# Patient Record
Sex: FEMALE | Race: WHITE | NOT HISPANIC OR LATINO | Employment: UNEMPLOYED | URBAN - METROPOLITAN AREA
[De-identification: names, ages, dates, MRNs, and addresses within clinical notes are randomized per-mention and may not be internally consistent; named-entity substitution may affect disease eponyms.]

---

## 2017-04-25 ENCOUNTER — HOSPITAL ENCOUNTER (EMERGENCY)
Facility: HOSPITAL | Age: 27
Discharge: HOME | End: 2017-04-25
Attending: EMERGENCY MEDICINE
Payer: MEDICAID

## 2017-04-25 VITALS
TEMPERATURE: 98 F | OXYGEN SATURATION: 98 % | BODY MASS INDEX: 40.48 KG/M2 | HEIGHT: 62 IN | RESPIRATION RATE: 20 BRPM | DIASTOLIC BLOOD PRESSURE: 79 MMHG | HEART RATE: 89 BPM | SYSTOLIC BLOOD PRESSURE: 139 MMHG | WEIGHT: 220 LBS

## 2017-04-25 DIAGNOSIS — J18.9 PNEUMONIA OF RIGHT LOWER LOBE DUE TO INFECTIOUS ORGANISM: Primary | ICD-10-CM

## 2017-04-25 PROCEDURE — 25000242 PHARM REV CODE 250 ALT 637 W/ HCPCS: Performed by: EMERGENCY MEDICINE

## 2017-04-25 PROCEDURE — 94640 AIRWAY INHALATION TREATMENT: CPT

## 2017-04-25 PROCEDURE — 99284 EMERGENCY DEPT VISIT MOD MDM: CPT | Mod: 25

## 2017-04-25 PROCEDURE — 25000003 PHARM REV CODE 250: Performed by: EMERGENCY MEDICINE

## 2017-04-25 RX ORDER — IPRATROPIUM BROMIDE AND ALBUTEROL SULFATE 2.5; .5 MG/3ML; MG/3ML
3 SOLUTION RESPIRATORY (INHALATION)
Status: COMPLETED | OUTPATIENT
Start: 2017-04-25 | End: 2017-04-25

## 2017-04-25 RX ORDER — AZITHROMYCIN 250 MG/1
250 TABLET, FILM COATED ORAL DAILY
Qty: 6 TABLET | Refills: 0 | Status: SHIPPED | OUTPATIENT
Start: 2017-04-25 | End: 2017-05-05

## 2017-04-25 RX ORDER — AZITHROMYCIN 250 MG/1
1000 TABLET, FILM COATED ORAL
Status: COMPLETED | OUTPATIENT
Start: 2017-04-25 | End: 2017-04-25

## 2017-04-25 RX ADMIN — AZITHROMYCIN 1000 MG: 250 TABLET, FILM COATED ORAL at 10:04

## 2017-04-25 RX ADMIN — IPRATROPIUM BROMIDE AND ALBUTEROL SULFATE 3 ML: .5; 3 SOLUTION RESPIRATORY (INHALATION) at 10:04

## 2017-04-25 NOTE — ED AVS SNAPSHOT
[facility] [address] [place] [address]               Patricia James   2017  9:49 PM   ED    Description:  Female : 1990   Department:  [facility]           Your Care was Coordinated By:     Provider Role From To    Joaquim Nguyễn MD Attending Provider [phone] --      Reason for Visit     Cough           Diagnoses this Visit        Comments    Pneumonia of right lower lobe due to infectious organism    -  Primary       ED Disposition     None           To Do List           Follow-up Information     Follow up with [facility].    Specialty:  Emergency Medicine    Why:  As needed, If symptoms worsen    Contact information:    [address] [place] [id number] [phone]       These Medications        Disp Refills Start End    azithromycin (Z-CHAVO) 250 MG tablet 6 tablet 0 2017    Take 1 tablet (250 mg total) by mouth once daily. Take first 2 tablets together, then 1 every day until finished. - Oral    Pharmacy: [facility]/pharmacy #[id number] - [place], [place] - [address] [facility].  #: [phone]         [facility]     [facility] Care [facility] -  Assistance  Unless otherwise directed by your provider, please contact [facility], our nurse care line that is available for  assistance.     Registered nurses in the [facility] provide: appointment scheduling, clinical advisement, health education, and other advisory services.  Call: [phone] (toll free)               Medications           Message regarding Medications     Verify the changes and/or additions to your medication regime listed below are the same as discussed with your clinician today.  If any of these changes or additions are incorrect, please notify your healthcare provider.        START taking these NEW medications        Refills    azithromycin (Z-CHAVO) 250 MG tablet 0    Sig:  "Take 1 tablet (250 mg total) by mouth once daily. Take first 2 tablets together, then 1 every day until finished.    Class: Print    Route: Oral      These medications were administered today        Dose Freq    albuterol-ipratropium 2.5mg-0.5mg/3mL nebulizer solution 3 mL 3 mL ED 1 Time    Sig: Take 3 mLs by nebulization ED 1 Time.    Class: Normal    Route: Nebulization    azithromycin tablet 1,000 mg 1,000 mg ED 1 Time    Sig: Take 4 tablets (1,000 mg total) by mouth ED 1 Time.    Class: Normal    Route: Oral           Verify that the below list of medications is an accurate representation of the medications you are currently taking.  If none reported, the list may be blank. If incorrect, please contact your healthcare provider. Carry this list with you in case of emergency.           Current Medications     GUAIFENESIN/PHENYLEPHRINE HCL (MUCINEX COLD ORAL) Take by mouth.    azithromycin (Z-CHAVO) 250 MG tablet Take 1 tablet (250 mg total) by mouth once daily. Take first 2 tablets together, then 1 every day until finished.    azithromycin tablet 1,000 mg Take 4 tablets (1,000 mg total) by mouth ED 1 Time.           Clinical Reference Information           Your Vitals Were     BP Pulse Temp Resp Height Weight    147/81 98 98 °F (36.7 °C) (Oral) 20 5' 2" (1.575 m) 99.8 kg (220 lb)    Last Period SpO2 BMI          04/04/2017 98% 40.24 kg/m2        Allergies as of 4/25/2017        Reactions    Keflex [Cephalexin]     rash    Latex, Natural Rubber Hives      Immunizations Administered on Date of Encounter - 4/25/2017     None      ED Micro, Lab, POCT     None      ED Imaging Orders     Start Ordered       Status Ordering Provider    04/25/17 2158 04/25/17 2157  X-Ray Chest PA And Lateral  1 time imaging      In process         Discharge Instructions         Pneumonia (Adult)  Pneumonia is an infection deep within the lungs. It is in the small air sacs (alveoli). Pneumonia may be caused by a virus or bacteria. Pneumonia " caused by bacteria is usually treated with an antibiotic. Severe cases may need to be treated in the hospital. Milder cases can be treated at home. Symptoms usually start to get better during the first 2 days of treatment.    Home care  Follow these guidelines when caring for yourself at home:  · Rest at home for the first 2 to 3 days, or until you feel stronger. Dont let yourself get overly tired when you go back to your activities.  · Stay away from cigarette smoke - yours or other peoples.  · You may use acetaminophen or ibuprofen to control fever or pain, unless another medicine was prescribed. If you have chronic liver or kidney disease, talk with your health care provider before using these medicines. Also talk with your provider if youve had a stomach ulcer or GI bleeding. Dont give aspirin to anyone younger than 18 years of age who is ill with a fever. It may cause severe liver damage.  · Your appetite may be poor, so a light diet is fine.  · Drink 6 to 8 glasses of fluids every day to make sure you are getting enough fluids. Beverages can include water, sport drinks, sodas without caffeine, juices, tea, or soup. Fluids will help loosen secretions in the lung. This will make it easier for you to cough up the phlegm (sputum). If you also have heart or kidney disease, check with your health care provider before you drink extra fluids.  · Take antibiotic medicine prescribed until it is all gone, even if you are feeling better after a few days.  Follow-up care  Follow up with your health care provider in the next 2 to 3 days, or as advised. This is to be sure the medicine is helping you get better.  If you are 65 or older, you should get a pneumococcal vaccine and a yearly flu (influenza) shot. You should also get these vaccines if you have chronic lung disease like asthma, emphysema, or COPD. Ask your provider about this.  When to seek medical advice  Call your health care provider right away if any of  these occur:  · You dont get better within the first 48 hours of treatment  · Shortness of breath gets worse  · Rapid breathing (more than 25 breaths per minute)  · Coughing up blood  · Chest pain gets worse with breathing  · Fever of 102°F (38°C) or higher that doesnt get better with fever medicine  · Weakness, dizziness, or fainting that gets worse  · Thirst or dry mouth that gets worse  · Sinus pain, headache, or a stiff neck  · Chest pain not caused by coughing  Date Last Reviewed: 12/23/2014  © 6808-1874 [employer]. [address] [place] [address]. All rights reserved. This information is not intended as a substitute for professional medical care. Always follow your healthcare professional's instructions.           [facility] complies with applicable Federal civil rights laws and does not discriminate on the basis of race, color, national origin, age, disability, or sex.        Language Assistance Services     ATTENTION: Language assistance services are available, free of charge. Please call [phone].      ATENCIÓN: Si habla español, tiene a prajapati disposición servicios gratuitos de asistencia lingüística. Llame al [phone].     CHÚ Ý: N?u b?n nói Ti?ng Vi?t, có các d?ch v? h? tr? ngôn ng? mi?n phí dành cho b?n. G?i s? [phone].

## 2017-04-26 NOTE — PROGRESS NOTES
98% sats on room air. Duoneb aero tx given and tolerated well. BS diminished, aeration increased post tx.

## 2017-04-26 NOTE — DISCHARGE INSTRUCTIONS
Pneumonia (Adult)  Pneumonia is an infection deep within the lungs. It is in the small air sacs (alveoli). Pneumonia may be caused by a virus or bacteria. Pneumonia caused by bacteria is usually treated with an antibiotic. Severe cases may need to be treated in the hospital. Milder cases can be treated at home. Symptoms usually start to get better during the first 2 days of treatment.    Home care  Follow these guidelines when caring for yourself at home:  · Rest at home for the first 2 to 3 days, or until you feel stronger. Dont let yourself get overly tired when you go back to your activities.  · Stay away from cigarette smoke - yours or other peoples.  · You may use acetaminophen or ibuprofen to control fever or pain, unless another medicine was prescribed. If you have chronic liver or kidney disease, talk with your health care provider before using these medicines. Also talk with your provider if youve had a stomach ulcer or GI bleeding. Dont give aspirin to anyone younger than 18 years of age who is ill with a fever. It may cause severe liver damage.  · Your appetite may be poor, so a light diet is fine.  · Drink 6 to 8 glasses of fluids every day to make sure you are getting enough fluids. Beverages can include water, sport drinks, sodas without caffeine, juices, tea, or soup. Fluids will help loosen secretions in the lung. This will make it easier for you to cough up the phlegm (sputum). If you also have heart or kidney disease, check with your health care provider before you drink extra fluids.  · Take antibiotic medicine prescribed until it is all gone, even if you are feeling better after a few days.  Follow-up care  Follow up with your health care provider in the next 2 to 3 days, or as advised. This is to be sure the medicine is helping you get better.  If you are 65 or older, you should get a pneumococcal vaccine and a yearly flu (influenza) shot. You should also get these vaccines if you have  chronic lung disease like asthma, emphysema, or COPD. Ask your provider about this.  When to seek medical advice  Call your health care provider right away if any of these occur:  · You dont get better within the first 48 hours of treatment  · Shortness of breath gets worse  · Rapid breathing (more than 25 breaths per minute)  · Coughing up blood  · Chest pain gets worse with breathing  · Fever of 102°F (38°C) or higher that doesnt get better with fever medicine  · Weakness, dizziness, or fainting that gets worse  · Thirst or dry mouth that gets worse  · Sinus pain, headache, or a stiff neck  · Chest pain not caused by coughing  Date Last Reviewed: 12/23/2014  © 1457-2606 [employer]. [address] [place] [address], [place] [address]. All rights reserved. This information is not intended as a substitute for professional medical care. Always follow your healthcare professional's instructions.

## 2017-04-26 NOTE — ED PROVIDER NOTES
"Encounter Date: 2017    SCRIBE #1 NOTE: I, Cristino Hansen, am scribing for, and in the presence of, Dr. Nguyễn.       History     Chief Complaint   Patient presents with    Cough     pt c/o cough for past 4-5days     Review of patient's allergies indicates:   Allergen Reactions    Keflex [cephalexin]      rash    Latex, natural rubber Hives     HPI Comments: 2017  10:06 PM     Chief Complaint: Cough      The patient is a 27 y.o. female with PMHx of renal disorder and PSHx of  section, classic and wrist arthroscopy with triangular fibrocartilage repair presents with an acute onset of a persistent productive cough for the past 4-5 days. Associated symptom of wheezing. Pt states she had been "achy" initially, but that it has resolved. Pt denies fever, chills, sneezing, and nasal congestion. No recent exposure to chemicals. Pt denies being a smoker.       The history is provided by the patient.     Past Medical History:   Diagnosis Date    Renal disorder     kidney stone     Past Surgical History:   Procedure Laterality Date     SECTION, CLASSIC      WRIST ARTHROSCOPY W/ TRIANGULAR FIBROCARTILAGE REPAIR       History reviewed. No pertinent family history.  Social History   Substance Use Topics    Smoking status: Never Smoker    Smokeless tobacco: Never Used    Alcohol use Yes      Comment: occasionally     Review of Systems   Constitutional: Negative for chills and fever.   HENT: Negative for congestion, sneezing and sore throat.    Respiratory: Positive for cough and wheezing. Negative for shortness of breath.    Cardiovascular: Negative for chest pain.   Gastrointestinal: Negative for nausea.   Genitourinary: Negative for dysuria.   Musculoskeletal: Negative for back pain.   Skin: Negative for rash.   Neurological: Negative for weakness.   Hematological: Does not bruise/bleed easily.       Physical Exam   Initial Vitals   BP Pulse Resp Temp SpO2   17 2130 17 2130 " 04/25/17 2130 04/25/17 2130 04/25/17 2130   147/81 101 16 98 °F (36.7 °C) 98 %     Physical Exam    Nursing note and vitals reviewed.  Constitutional: She appears well-developed and well-nourished.   HENT:   Head: Normocephalic and atraumatic.   Eyes: EOM are normal.   Neck: Normal range of motion. Neck supple.   Pulmonary/Chest: No respiratory distress. She has wheezes (faint bilateral expiratory wheezing).   Intermittent coughing     Musculoskeletal: Normal range of motion.   Neurological: She is alert and oriented to person, place, and time.   Skin: Skin is warm and dry.   Psychiatric: She has a normal mood and affect. Her behavior is normal. Judgment and thought content normal.         ED Course   Procedures  Labs Reviewed - No data to display       X-Rays:   Independently Interpreted Readings:   Chest X-Ray: There is an infiltrate in the RLL.     Medical Decision Making:   History:   Old Medical Records: I decided to obtain old medical records.  Clinical Tests:   Radiological Study: Ordered and Reviewed            Scribe Attestation:   Scribe #1: I performed the above scribed service and the documentation accurately describes the services I performed. I attest to the accuracy of the note.    Attending Attestation:           Physician Attestation for Scribe:  Physician Attestation Statement for Scribe #1: I, Dr. Nguyễn , reviewed documentation, as scribed by Cristino Hansen  in my presence, and it is both accurate and complete.                 ED Course     Clinical Impression:   The encounter diagnosis was Pneumonia of right lower lobe due to infectious organism.      27-year-old female presents to the emergency room with several days of coughing and wheezing.  X-ray demonstrates a right lower lobe pneumonia.  No evidence of sepsis or respiratory distress.  Patient will be started on antibiotics.  No indication for admission or lab tests.     Joaquim Nguyễn MD  04/25/17 [id number]

## 2017-04-27 ENCOUNTER — HOSPITAL ENCOUNTER (EMERGENCY)
Facility: HOSPITAL | Age: 27
Discharge: HOME | End: 2017-04-27
Attending: EMERGENCY MEDICINE
Payer: MEDICAID

## 2017-04-27 VITALS
TEMPERATURE: 98 F | RESPIRATION RATE: 20 BRPM | SYSTOLIC BLOOD PRESSURE: 140 MMHG | WEIGHT: 220 LBS | OXYGEN SATURATION: 93 % | BODY MASS INDEX: 40.24 KG/M2 | HEART RATE: 99 BPM | DIASTOLIC BLOOD PRESSURE: 90 MMHG

## 2017-04-27 DIAGNOSIS — J18.9 PNEUMONIA OF RIGHT LOWER LOBE DUE TO INFECTIOUS ORGANISM: Primary | ICD-10-CM

## 2017-04-27 DIAGNOSIS — R06.02 SOB (SHORTNESS OF BREATH): ICD-10-CM

## 2017-04-27 LAB
ALBUMIN SERPL BCP-MCNC: 4.4 G/DL
ALP SERPL-CCNC: 71 U/L
ALT SERPL W/O P-5'-P-CCNC: 32 U/L
ANION GAP SERPL CALC-SCNC: 11 MMOL/L
AST SERPL-CCNC: 23 U/L
B-HCG UR QL: NEGATIVE
BASOPHILS # BLD AUTO: 0 K/UL
BASOPHILS NFR BLD: 0.2 %
BILIRUB SERPL-MCNC: 1 MG/DL
BUN SERPL-MCNC: 8 MG/DL
CALCIUM SERPL-MCNC: 9.9 MG/DL
CHLORIDE SERPL-SCNC: 105 MMOL/L
CO2 SERPL-SCNC: 24 MMOL/L
CREAT SERPL-MCNC: 0.8 MG/DL
CTP QC/QA: YES
DIFFERENTIAL METHOD: ABNORMAL
EOSINOPHIL # BLD AUTO: 0.3 K/UL
EOSINOPHIL NFR BLD: 5.5 %
ERYTHROCYTE [DISTWIDTH] IN BLOOD BY AUTOMATED COUNT: 14.1 %
EST. GFR  (AFRICAN AMERICAN): >60 ML/MIN/1.73 M^2
EST. GFR  (NON AFRICAN AMERICAN): >60 ML/MIN/1.73 M^2
GLUCOSE SERPL-MCNC: 110 MG/DL
HCT VFR BLD AUTO: 37.2 %
HGB BLD-MCNC: 12.6 G/DL
LYMPHOCYTES # BLD AUTO: 1.4 K/UL
LYMPHOCYTES NFR BLD: 23.6 %
MCH RBC QN AUTO: 26.9 PG
MCHC RBC AUTO-ENTMCNC: 33.8 %
MCV RBC AUTO: 80 FL
MONOCYTES # BLD AUTO: 0.4 K/UL
MONOCYTES NFR BLD: 6.6 %
NEUTROPHILS # BLD AUTO: 3.7 K/UL
NEUTROPHILS NFR BLD: 64.1 %
PLATELET # BLD AUTO: 304 K/UL
PMV BLD AUTO: 7.4 FL
POTASSIUM SERPL-SCNC: 3.9 MMOL/L
PROT SERPL-MCNC: 7.8 G/DL
RBC # BLD AUTO: 4.68 M/UL
SODIUM SERPL-SCNC: 140 MMOL/L
WBC # BLD AUTO: 5.8 K/UL

## 2017-04-27 PROCEDURE — 25000242 PHARM REV CODE 250 ALT 637 W/ HCPCS: Performed by: EMERGENCY MEDICINE

## 2017-04-27 PROCEDURE — 36415 COLL VENOUS BLD VENIPUNCTURE: CPT

## 2017-04-27 PROCEDURE — 94640 AIRWAY INHALATION TREATMENT: CPT

## 2017-04-27 PROCEDURE — 99284 EMERGENCY DEPT VISIT MOD MDM: CPT | Mod: 25

## 2017-04-27 PROCEDURE — 81025 URINE PREGNANCY TEST: CPT | Performed by: EMERGENCY MEDICINE

## 2017-04-27 PROCEDURE — 85025 COMPLETE CBC W/AUTO DIFF WBC: CPT

## 2017-04-27 PROCEDURE — 80053 COMPREHEN METABOLIC PANEL: CPT

## 2017-04-27 PROCEDURE — 36000 PLACE NEEDLE IN VEIN: CPT

## 2017-04-27 RX ORDER — IPRATROPIUM BROMIDE AND ALBUTEROL SULFATE 2.5; .5 MG/3ML; MG/3ML
3 SOLUTION RESPIRATORY (INHALATION)
Status: COMPLETED | OUTPATIENT
Start: 2017-04-27 | End: 2017-04-27

## 2017-04-27 RX ORDER — BENZONATATE 100 MG/1
100 CAPSULE ORAL 3 TIMES DAILY PRN
Qty: 20 CAPSULE | Refills: 0 | Status: SHIPPED | OUTPATIENT
Start: 2017-04-27 | End: 2017-05-07

## 2017-04-27 RX ORDER — ALBUTEROL SULFATE 90 UG/1
1-2 AEROSOL, METERED RESPIRATORY (INHALATION) EVERY 6 HOURS PRN
Qty: 1 INHALER | Refills: 0 | Status: SHIPPED | OUTPATIENT
Start: 2017-04-27 | End: 2018-03-24

## 2017-04-27 RX ADMIN — IPRATROPIUM BROMIDE AND ALBUTEROL SULFATE 3 ML: .5; 3 SOLUTION RESPIRATORY (INHALATION) at 02:04

## 2017-04-27 NOTE — ED AVS SNAPSHOT
[facility] [address] [place] [address]               Patricia James   2017  2:03 PM   ED    Description:  Female : 1990   Department:  [facility]           Your Care was Coordinated By:     Provider Role From To    Ramiro Snyder MD Attending Provider 17 1408 --      Reason for Visit     Cough     Shortness of Breath           Diagnoses this Visit        Comments    Pneumonia of right lower lobe due to infectious organism    -  Primary     SOB (shortness of breath)           ED Disposition     None           To Do List           Follow-up Information     Schedule an appointment as soon as possible for a visit in 1 week to follow up.    Why:  with PCP       These Medications        Disp Refills Start End    albuterol 90 mcg/actuation inhaler 1 Inhaler 0 2017    Inhale 1-2 puffs into the lungs every 6 (six) hours as needed for Wheezing or Shortness of Breath. Rescue - Inhalation    Pharmacy: [facility]/pharmacy #[id number] - [place] - [address]. Ph #: [phone]       benzonatate (TESSALON) 100 MG capsule 20 capsule 0 2017    Take 1 capsule (100 mg total) by mouth 3 (three) times daily as needed for Cough. - Oral    Pharmacy: [facility]/pharmacy #[id number] - [place] - [address]. Ph #: [phone]         [facility]     [facility] Care [facility] -  Assistance  Unless otherwise directed by your provider, please contact [facility], our nurse care line that is available for  assistance.     Registered nurses in the [facility] provide: appointment scheduling, clinical advisement, health education, and other advisory services.  Call: [phone] (toll free)               Medications           Message regarding Medications     Verify the changes and/or additions to your medication regime listed below are the same as discussed with your clinician today.   If any of these changes or additions are incorrect, please notify your healthcare provider.        START taking these NEW medications        Refills    albuterol 90 mcg/actuation inhaler 0    Sig: Inhale 1-2 puffs into the lungs every 6 (six) hours as needed for Wheezing or Shortness of Breath. Rescue    Class: Print    Route: Inhalation    benzonatate (TESSALON) 100 MG capsule 0    Sig: Take 1 capsule (100 mg total) by mouth 3 (three) times daily as needed for Cough.    Class: Print    Route: Oral      These medications were administered today        Dose Freq    albuterol-ipratropium 2.5mg-0.5mg/3mL nebulizer solution 3 mL 3 mL ED 1 Time    Sig: Take 3 mLs by nebulization ED 1 Time.    Class: Normal    Route: Nebulization           Verify that the below list of medications is an accurate representation of the medications you are currently taking.  If none reported, the list may be blank. If incorrect, please contact your healthcare provider. Carry this list with you in case of emergency.           Current Medications     azithromycin (Z-CHAVO) 250 MG tablet Take 1 tablet (250 mg total) by mouth once daily. Take first 2 tablets together, then 1 every day until finished.    GUAIFENESIN/PHENYLEPHRINE HCL (MUCINEX COLD ORAL) Take by mouth.    albuterol 90 mcg/actuation inhaler Inhale 1-2 puffs into the lungs every 6 (six) hours as needed for Wheezing or Shortness of Breath. Rescue    benzonatate (TESSALON) 100 MG capsule Take 1 capsule (100 mg total) by mouth 3 (three) times daily as needed for Cough.           Clinical Reference Information           Your Vitals Were     BP Pulse Temp Resp Weight Last Period    140/90 (BP Location: Right arm, Patient Position: Sitting) 100 97.7 °F (36.5 °C) (Oral) 20 99.8 kg (220 lb) 04/04/2017    SpO2 BMI             98% 40.24 kg/m2         Allergies as of 4/27/2017        Reactions    Keflex [Cephalexin]     rash    Latex, Natural Rubber Hives      Immunizations Administered on Date  of Encounter - 4/27/2017     None      ED Micro, Lab, POCT     Start Ordered       Status Ordering Provider    04/27/17 1418 04/27/17 1418  POCT urine pregnancy  Once      Final result     04/27/17 1418 04/27/17 1418  Comprehensive Metabolic Panel (CMP)  STAT      Final result     04/27/17 1418 04/27/17 1418  Complete Blood Count (CBC)  STAT      Final result       ED Imaging Orders     Start Ordered       Status Ordering Provider    04/27/17 1418 04/27/17 1418  X-Ray Chest PA And Lateral  1 time imaging      Final result         Discharge Instructions         Pneumonia (Adult)  Pneumonia is an infection deep within the lungs. It is in the small air sacs (alveoli). Pneumonia may be caused by a virus or bacteria. Pneumonia caused by bacteria is usually treated with an antibiotic. Severe cases may need to be treated in the hospital. Milder cases can be treated at home. Symptoms usually start to get better during the first 2 days of treatment.    Home care  Follow these guidelines when caring for yourself at home:  · Rest at home for the first 2 to 3 days, or until you feel stronger. Dont let yourself get overly tired when you go back to your activities.  · Stay away from cigarette smoke - yours or other peoples.  · You may use acetaminophen or ibuprofen to control fever or pain, unless another medicine was prescribed. If you have chronic liver or kidney disease, talk with your health care provider before using these medicines. Also talk with your provider if youve had a stomach ulcer or GI bleeding. Dont give aspirin to anyone younger than 18 years of age who is ill with a fever. It may cause severe liver damage.  · Your appetite may be poor, so a light diet is fine.  · Drink 6 to 8 glasses of fluids every day to make sure you are getting enough fluids. Beverages can include water, sport drinks, sodas without caffeine, juices, tea, or soup. Fluids will help loosen secretions in the lung. This will make it easier  for you to cough up the phlegm (sputum). If you also have heart or kidney disease, check with your health care provider before you drink extra fluids.  · Take antibiotic medicine prescribed until it is all gone, even if you are feeling better after a few days.  Follow-up care  Follow up with your health care provider in the next 2 to 3 days, or as advised. This is to be sure the medicine is helping you get better.  If you are 65 or older, you should get a pneumococcal vaccine and a yearly flu (influenza) shot. You should also get these vaccines if you have chronic lung disease like asthma, emphysema, or COPD. Ask your provider about this.  When to seek medical advice  Call your health care provider right away if any of these occur:  · You dont get better within the first 48 hours of treatment  · Shortness of breath gets worse  · Rapid breathing (more than 25 breaths per minute)  · Coughing up blood  · Chest pain gets worse with breathing  · Fever of 102°F (38°C) or higher that doesnt get better with fever medicine  · Weakness, dizziness, or fainting that gets worse  · Thirst or dry mouth that gets worse  · Sinus pain, headache, or a stiff neck  · Chest pain not caused by coughing  Date Last Reviewed: 12/23/2014  © 7302-8991 [employer]. [address] [place] [address], [place] [address]. All rights reserved. This information is not intended as a substitute for professional medical care. Always follow your healthcare professional's instructions.           [facility] complies with applicable Federal civil rights laws and does not discriminate on the basis of race, color, national origin, age, disability, or sex.        Language Assistance Services     ATTENTION: Language assistance services are available, free of charge. Please call [phone].      ATENCIÓN: Si habla español, tiene a prajapati disposición servicios gratuitos de asistencia lingüística. Llame al [phone].     VIRIDIANA Ý: N?u  b?n nói Ti?ng Vi?t, có các d?ch v? h? tr? ngôn ng? mi?n phí dành cho b?n. G?i s? [phone].

## 2017-04-27 NOTE — DISCHARGE INSTRUCTIONS
Pneumonia (Adult)  Pneumonia is an infection deep within the lungs. It is in the small air sacs (alveoli). Pneumonia may be caused by a virus or bacteria. Pneumonia caused by bacteria is usually treated with an antibiotic. Severe cases may need to be treated in the hospital. Milder cases can be treated at home. Symptoms usually start to get better during the first 2 days of treatment.    Home care  Follow these guidelines when caring for yourself at home:  · Rest at home for the first 2 to 3 days, or until you feel stronger. Dont let yourself get overly tired when you go back to your activities.  · Stay away from cigarette smoke - yours or other peoples.  · You may use acetaminophen or ibuprofen to control fever or pain, unless another medicine was prescribed. If you have chronic liver or kidney disease, talk with your health care provider before using these medicines. Also talk with your provider if youve had a stomach ulcer or GI bleeding. Dont give aspirin to anyone younger than 18 years of age who is ill with a fever. It may cause severe liver damage.  · Your appetite may be poor, so a light diet is fine.  · Drink 6 to 8 glasses of fluids every day to make sure you are getting enough fluids. Beverages can include water, sport drinks, sodas without caffeine, juices, tea, or soup. Fluids will help loosen secretions in the lung. This will make it easier for you to cough up the phlegm (sputum). If you also have heart or kidney disease, check with your health care provider before you drink extra fluids.  · Take antibiotic medicine prescribed until it is all gone, even if you are feeling better after a few days.  Follow-up care  Follow up with your health care provider in the next 2 to 3 days, or as advised. This is to be sure the medicine is helping you get better.  If you are 65 or older, you should get a pneumococcal vaccine and a yearly flu (influenza) shot. You should also get these vaccines if you have  chronic lung disease like asthma, emphysema, or COPD. Ask your provider about this.  When to seek medical advice  Call your health care provider right away if any of these occur:  · You dont get better within the first 48 hours of treatment  · Shortness of breath gets worse  · Rapid breathing (more than 25 breaths per minute)  · Coughing up blood  · Chest pain gets worse with breathing  · Fever of 102°F (38°C) or higher that doesnt get better with fever medicine  · Weakness, dizziness, or fainting that gets worse  · Thirst or dry mouth that gets worse  · Sinus pain, headache, or a stiff neck  · Chest pain not caused by coughing  Date Last Reviewed: 12/23/2014  © 7521-8170 [employer]. [address] [place] [address], [place] [address]. All rights reserved. This information is not intended as a substitute for professional medical care. Always follow your healthcare professional's instructions.

## 2017-04-27 NOTE — ED PROVIDER NOTES
Encounter Date: 2017    SCRIBE #1 NOTE: I, Layo Barton, am scribing for, and in the presence of,  Ramiro Snyder. I have scribed the entire note.       History     Chief Complaint   Patient presents with    Cough     dx with pneumonia 2 days here. Pt feels she is getting worse    Shortness of Breath     Review of patient's allergies indicates:   Allergen Reactions    Keflex [cephalexin]      rash    Latex, natural rubber Hives     HPI Comments: 2017  2:11 PM     Chief Complaint:       The patient is a 27 y.o. female who is presenting with with a complaint of shortness of breath and cough(dry) for the past week. It has been persistent, moderate and worsening. She was diagnosed with pneumonia at this facility 2 days prior started on Z-ani and Mucinex with poor control of her symptoms. She denies further fever, N/V, or shortness of breath, but reports neck pain(usually during cough). She reports mild improvement briefly while in the ED after receiving a breathing treatment.            has a past medical history of Renal disorder.  has a past surgical history that includes  section, classic and Wrist arthroscopy w/ triangular fibrocartilage repair.\    The history is provided by the patient.     Past Medical History:   Diagnosis Date    Renal disorder     kidney stone     Past Surgical History:   Procedure Laterality Date     SECTION, CLASSIC      WRIST ARTHROSCOPY W/ TRIANGULAR FIBROCARTILAGE REPAIR       History reviewed. No pertinent family history.  Social History   Substance Use Topics    Smoking status: Never Smoker    Smokeless tobacco: Never Used    Alcohol use Yes      Comment: occasionally     Review of Systems   Constitutional: Negative for fever.   HENT: Negative for postnasal drip and sore throat.    Respiratory: Positive for cough. Negative for shortness of breath.    Cardiovascular: Negative for chest pain.   Gastrointestinal: Negative for blood in stool, nausea and  vomiting.        Denies change in bowel habits   Genitourinary: Negative for dysuria.   Musculoskeletal: Positive for neck pain. Negative for back pain.   Skin: Negative for rash.   Neurological: Negative for weakness.   Hematological: Does not bruise/bleed easily.   All other systems reviewed and are negative.      Physical Exam   Initial Vitals   BP Pulse Resp Temp SpO2   04/27/17 1400 04/27/17 1400 04/27/17 1400 04/27/17 1400 04/27/17 1400   140/90 100 16 97.7 °F (36.5 °C) 96 %     Physical Exam    Nursing note and vitals reviewed.  Constitutional: She appears well-developed and well-nourished.   HENT:   Head: Normocephalic.   Eyes: EOM are normal. Pupils are equal, round, and reactive to light.   Neck: Normal range of motion. Neck supple.   Cardiovascular: Normal rate, regular rhythm, normal heart sounds and intact distal pulses.   Pulmonary/Chest:   coarse breath sounds right lung, bilateral expiratory wheezes,   Abdominal: Soft. Bowel sounds are normal.   Musculoskeletal: Normal range of motion.   Neurological: She is alert and oriented to person, place, and time.   Skin: Skin is warm and dry.   Psychiatric: She has a normal mood and affect. Her behavior is normal. Judgment and thought content normal.         ED Course   Procedures  Labs Reviewed   CBC W/ AUTO DIFFERENTIAL - Abnormal; Notable for the following:        Result Value    MCV 80 (*)     MCH 26.9 (*)     MPV 7.4 (*)     All other components within normal limits   COMPREHENSIVE METABOLIC PANEL   POCT URINE PREGNANCY   POC Urine - negative       X-Rays:   Independently Interpreted Readings:   Chest X-Ray: Final result by Jesica Lynne MD (04/27/17 14:57:15)    Impression:        Decrease of the right middle lobe/basilar infiltrate      Electronically signed by: JESICA LYNNE MD  Date: 04/27/17  Time: 14:57        Medical Decision Making:   History:   Old Medical Records: I decided to obtain old medical records.  Old Records Summarized: records  from clinic visits.  Initial Assessment:   27-year-old female presented with a chief complaint of shortness of breath.  Differential Diagnosis:   Initial differential diagnosis included but not limited to worsening pneumonia, pneumothorax, medication reaction, and dehydration.  Clinical Tests:   Lab Tests: Ordered and Reviewed  Radiological Study: Ordered and Reviewed  ED Management:  The patient was emergently evaluated in the ED, her evaluation was significant for a young female with abnormal lung sounds.  She was aggressively treated in the ED with a respiratory nebulizer treatment, with improvement in it.  The patient's labs showed no acute processes.  The patient's chest x-ray does show an improving pneumonia on the right side.  The etiology of patient's symptoms is likely her continued to pneumonia, but I believe that she is stable for discharge to home since her pneumonia is improving.  The patient will be discharged home with an albuterol inhaler and by mouth Tessalon Perles take as needed for her cough.  She is to follow-up with her PCP for further care.  She has also been instructed to continue taking her antibiotics as previously prescribed.                    ED Course     Clinical Impression:   The primary encounter diagnosis was Pneumonia of right lower lobe due to infectious organism. A diagnosis of SOB (shortness of breath) was also pertinent to this visit.          Ramiro Snyder MD  04/27/17 3937

## 2018-03-24 ENCOUNTER — HOSPITAL ENCOUNTER (EMERGENCY)
Facility: HOSPITAL | Age: 28
Discharge: HOME | End: 2018-03-24
Attending: EMERGENCY MEDICINE
Payer: MEDICAID

## 2018-03-24 VITALS
HEIGHT: 62 IN | SYSTOLIC BLOOD PRESSURE: 139 MMHG | WEIGHT: 206 LBS | TEMPERATURE: 99 F | BODY MASS INDEX: 37.91 KG/M2 | RESPIRATION RATE: 16 BRPM | OXYGEN SATURATION: 98 % | DIASTOLIC BLOOD PRESSURE: 86 MMHG | HEART RATE: 79 BPM

## 2018-03-24 DIAGNOSIS — S66.419A STRAIN OF INTRINSIC MUSCLE OF THUMB: Primary | ICD-10-CM

## 2018-03-24 PROCEDURE — 99283 EMERGENCY DEPT VISIT LOW MDM: CPT | Mod: 25

## 2018-03-24 PROCEDURE — 29125 APPL SHORT ARM SPLINT STATIC: CPT | Mod: RT

## 2018-03-24 RX ORDER — IBUPROFEN 800 MG/1
800 TABLET ORAL EVERY 6 HOURS PRN
Qty: 20 TABLET | Refills: 0 | Status: SHIPPED | OUTPATIENT
Start: 2018-03-24 | End: 2022-07-27

## 2018-03-24 RX ORDER — IBUPROFEN 400 MG/1
800 TABLET ORAL
Status: DISCONTINUED | OUTPATIENT
Start: 2018-03-24 | End: 2018-03-25 | Stop reason: HOSPADM

## 2018-03-25 NOTE — ED PROVIDER NOTES
"Encounter Date: 3/24/2018    SCRIBE #1 NOTE: I, Kaya Laird, am scribing for, and in the presence of,  Dr. Roberson. I have scribed the entire note.       History     Chief Complaint   Patient presents with    Hand Injury     Rt thumb injury.  "feels like a bad sprain"     2018 11:15 PM     Chief complaint: Right thumb pain      Patricia James is a 28 y.o. female who presents to the ED with an onset of worsening right thumb pain after her six year old neighbor twisted her thumb "about 4 hours ago." Patient reports that she "felt something pop" so she came to the ED. Moving her thumb exacerbates her pain. There are no alleviating factors for her symptoms. No pertinent PMHx. Her PSHx includes a wrist arthroscopy.      The history is provided by the patient.     Review of patient's allergies indicates:   Allergen Reactions    Keflex [cephalexin]      rash    Latex, natural rubber Hives     Past Medical History:   Diagnosis Date    Renal disorder     kidney stone     Past Surgical History:   Procedure Laterality Date     SECTION, CLASSIC      WRIST ARTHROSCOPY W/ TRIANGULAR FIBROCARTILAGE REPAIR       History reviewed. No pertinent family history.  Social History   Substance Use Topics    Smoking status: Never Smoker    Smokeless tobacco: Never Used    Alcohol use Yes      Comment: occasionally     Review of Systems   Constitutional: Negative for fever.   HENT: Negative for congestion.    Eyes: Negative for visual disturbance.   Respiratory: Negative for wheezing.    Cardiovascular: Negative for chest pain.   Gastrointestinal: Negative for abdominal pain.   Genitourinary: Negative for dysuria.   Musculoskeletal: Positive for arthralgias (right thumb pain). Negative for joint swelling.   Skin: Negative for rash.   Neurological: Negative for syncope.   Hematological: Does not bruise/bleed easily.   Psychiatric/Behavioral: Negative for confusion.       Physical Exam     Initial Vitals " [03/24/18 2132]   BP Pulse Resp Temp SpO2   (!) 178/102 87 16 98.6 °F (37 °C) 98 %      MAP       127.33         Physical Exam    Nursing note and vitals reviewed.  Constitutional: She appears well-nourished.   HENT:   Head: Normocephalic and atraumatic.   Eyes: Conjunctivae and EOM are normal.   Neck: Normal range of motion. Neck supple. No thyroid mass present.   Cardiovascular: Normal rate, regular rhythm and normal heart sounds. Exam reveals no gallop and no friction rub.    No murmur heard.  Pulmonary/Chest: Breath sounds normal. She has no wheezes. She has no rhonchi. She has no rales.   Musculoskeletal: She exhibits tenderness.   TTP to proximal palmar right thumb; functionality intact; restricted ROM with abduction and adduction due to pain. Pt hesitates to oppose her right thumb secondary to pain. Thumb neurovascularly intact. No tenderness over snuff box.    Neurological: She is alert and oriented to person, place, and time. She has normal strength. No cranial nerve deficit or sensory deficit.   Skin: Skin is warm and dry. No rash noted. No erythema.   Psychiatric: She has a normal mood and affect. Her speech is normal. Cognition and memory are normal.         ED Course   Procedures  Labs Reviewed   POCT URINE PREGNANCY        Imaging Results          X-Ray Hand 3 view Right (In process)                    Medical Decision Making:   History:   Old Medical Records: I decided to obtain old medical records.  Clinical Tests:   Radiological Study: Reviewed and Ordered  ED Management:  This patient was interviewed and examined emergently.  Vital signs are stable.  Her hand examination exhibits no evidence of contusion, abrasion, laceration, laxity to any of the joints or gross dislocation.  Radiographs indicate no acute radiographic findings but it appears she's progressing with evidence of a thumb strain.  The patient will be placed in a rigid thumb spica splint is asked to keep this on until she is cleared by  her primary care physician or orthopedic specialist.  She is asked to ice and elevate the affected extremity and to take oral anti-inflammatory over-the-counter medications as needed.  She is asked to return to the ER for any new, concerning, or worsening symptoms.  Patient agreeable with this plan for follow-up and she was discharged in stable condition.            Scribe Attestation:   Scribe #1: I performed the above scribed service and the documentation accurately describes the services I performed. I attest to the accuracy of the note.    I, Dr. Jaime Roberson, personally performed the services described in this documentation. All medical record entries made by the scribe were at my direction and in my presence.  I have reviewed the chart and agree that the record reflects my personal performance and is accurate and complete. Jaime Roberson MD.  7:41 PM 03/30/2018             Clinical Impression:     1. Strain of intrinsic muscle of thumb          Disposition:   Disposition: Discharged  Condition: Stable                        Jaime Roberson MD  03/31/18 0026

## 2018-03-25 NOTE — ED NOTES
Assumed care:  Patient awake, alert and oriented x 3, skin warm and dry, in NAD with family at bedside.  Patient states that a child pulled her thumb on her right hand and states that she felt something pop

## 2019-03-01 ENCOUNTER — CLINICAL SUPPORT (OUTPATIENT)
Dept: URGENT CARE | Facility: CLINIC | Age: 29
End: 2019-03-01

## 2019-03-01 DIAGNOSIS — Z02.89 ENCOUNTER FOR PHYSICAL EXAMINATION RELATED TO EMPLOYMENT: ICD-10-CM

## 2019-03-01 PROCEDURE — 99499 PHYSICAL - BASIC COMPLEXITY: ICD-10-PCS | Mod: S$GLB,,, | Performed by: NURSE PRACTITIONER

## 2019-03-01 PROCEDURE — 99499 UNLISTED E&M SERVICE: CPT | Mod: S$GLB,,, | Performed by: NURSE PRACTITIONER

## 2019-03-11 ENCOUNTER — HOSPITAL ENCOUNTER (EMERGENCY)
Facility: HOSPITAL | Age: 29
Discharge: HOME | End: 2019-03-11
Attending: EMERGENCY MEDICINE

## 2019-03-11 VITALS
BODY MASS INDEX: 41.49 KG/M2 | SYSTOLIC BLOOD PRESSURE: 133 MMHG | DIASTOLIC BLOOD PRESSURE: 83 MMHG | TEMPERATURE: 99 F | WEIGHT: 225.5 LBS | HEART RATE: 105 BPM | RESPIRATION RATE: 16 BRPM | HEIGHT: 62 IN | OXYGEN SATURATION: 97 %

## 2019-03-11 DIAGNOSIS — L02.415 ABSCESS OF RIGHT LEG: Primary | ICD-10-CM

## 2019-03-11 PROCEDURE — 99283 EMERGENCY DEPT VISIT LOW MDM: CPT | Mod: 25

## 2019-03-11 PROCEDURE — 10060 I&D ABSCESS SIMPLE/SINGLE: CPT

## 2019-03-11 PROCEDURE — 25000003 PHARM REV CODE 250: Performed by: EMERGENCY MEDICINE

## 2019-03-11 RX ORDER — DOXYCYCLINE 100 MG/1
100 CAPSULE ORAL EVERY 12 HOURS
Qty: 14 CAPSULE | Refills: 0 | Status: SHIPPED | OUTPATIENT
Start: 2019-03-11 | End: 2019-03-18

## 2019-03-11 RX ORDER — LIDOCAINE HYDROCHLORIDE AND EPINEPHRINE 10; 10 MG/ML; UG/ML
5 INJECTION, SOLUTION INFILTRATION; PERINEURAL ONCE
Status: COMPLETED | OUTPATIENT
Start: 2019-03-11 | End: 2019-03-11

## 2019-03-11 RX ORDER — IBUPROFEN 400 MG/1
800 TABLET ORAL
Status: COMPLETED | OUTPATIENT
Start: 2019-03-11 | End: 2019-03-11

## 2019-03-11 RX ORDER — MUPIROCIN 20 MG/G
OINTMENT TOPICAL 3 TIMES DAILY
COMMUNITY
End: 2022-07-27

## 2019-03-11 RX ORDER — DOXYCYCLINE HYCLATE 100 MG
100 TABLET ORAL
Status: COMPLETED | OUTPATIENT
Start: 2019-03-11 | End: 2019-03-11

## 2019-03-11 RX ADMIN — BACITRACIN ZINC NEOMYCIN SULFATE POLYMYXIN B SULFATE 15 G: 400; 3.5; 5 OINTMENT TOPICAL at 10:03

## 2019-03-11 RX ADMIN — LIDOCAINE HYDROCHLORIDE,EPINEPHRINE BITARTRATE 5 ML: 10; .01 INJECTION, SOLUTION INFILTRATION; PERINEURAL at 09:03

## 2019-03-11 RX ADMIN — IBUPROFEN 800 MG: 400 TABLET, FILM COATED ORAL at 09:03

## 2019-03-11 RX ADMIN — DOXYCYCLINE HYCLATE 100 MG: 100 TABLET, COATED ORAL at 10:03

## 2019-03-12 NOTE — ED PROVIDER NOTES
Encounter Date: 3/11/2019    SCRIBE #1 NOTE: I, Susie Bennett, am scribing for, and in the presence of, Dr. Roberson.       History     Chief Complaint   Patient presents with    Cellulitis     right leg. Started with possible spider bit. Seen at urgent care on Saturday and started antibiotics on , Bactrim, Bactroban       Time seen by provider: 8:59 PM on 2019    Patricia James is a 29 y.o. female with no pertinent medical history who presents to the ED with leg pain onset 4 days. She complains of an area of redness and pain to her right leg below her knee. Patient was recently put on Bactrim 3 days ago without much relief. She denies any shortness of breath, chest pain, nausea, vomiting, back pain, numbness, weakness, or fever.         The history is provided by the patient. No  was used.     Review of patient's allergies indicates:   Allergen Reactions    Keflex [cephalexin]      rash    Latex, natural rubber Hives     Past Medical History:   Diagnosis Date    Renal disorder     kidney stone     Past Surgical History:   Procedure Laterality Date     SECTION, CLASSIC      WRIST ARTHROSCOPY W/ TRIANGULAR FIBROCARTILAGE REPAIR       History reviewed. No pertinent family history.  Social History     Tobacco Use    Smoking status: Never Smoker    Smokeless tobacco: Never Used   Substance Use Topics    Alcohol use: Yes     Comment: occasionally    Drug use: No     Review of Systems   Constitutional: Negative for chills and fever.   HENT: Negative for congestion, drooling and sore throat.    Eyes: Negative for photophobia and visual disturbance.   Respiratory: Negative for cough, shortness of breath and wheezing.    Cardiovascular: Negative for chest pain.   Gastrointestinal: Negative for abdominal pain, diarrhea and vomiting.   Genitourinary: Negative for dysuria and hematuria.   Musculoskeletal: Negative for joint swelling and neck pain.   Skin: Positive for  color change (redness and tenderness). Negative for rash.   Neurological: Negative for syncope, weakness and numbness.   Hematological: Does not bruise/bleed easily.   Psychiatric/Behavioral: Negative for confusion.       Physical Exam     Initial Vitals [03/11/19 2014]   BP Pulse Resp Temp SpO2   133/83 105 16 98.7 °F (37.1 °C) 97 %      MAP       --         Physical Exam    Nursing note and vitals reviewed.  Constitutional: She appears well-nourished.   HENT:   Head: Normocephalic and atraumatic.   Eyes: Conjunctivae and EOM are normal.   Neck: Normal range of motion. Neck supple. No thyroid mass present.   Cardiovascular: Normal rate, regular rhythm and normal heart sounds. Exam reveals no gallop and no friction rub.    No murmur heard.  Pulmonary/Chest: Breath sounds normal. She has no wheezes. She has no rhonchi. She has no rales.   Abdominal: Soft. Normal appearance and bowel sounds are normal. There is no tenderness.   Musculoskeletal:        Legs:  Neurological: She is alert and oriented to person, place, and time. She has normal strength. No cranial nerve deficit or sensory deficit.   Skin: Skin is warm and dry. No rash noted. There is erythema.   11 cm x 11 cm of erythema with small central area of induration to the right lateral lower leg. Non circumferential.    Psychiatric: She has a normal mood and affect. Her speech is normal. Cognition and memory are normal.         ED Course   I & D - Incision and Drainage  Date/Time: 3/11/2019 9:15 PM  Performed by: Jaime Roberson MD  Authorized by: Jaime Roberson MD   Consent Done: Not Needed  Type: abscess  Body area: lower extremity  Anesthesia: local infiltration    Anesthesia:  Local Anesthetic: lidocaine 1% with epinephrine  Patient sedated: no  Scalpel size: 11  Incision type: single straight  Complexity: simple  Drainage: bloody  Drainage amount: scant  Wound treatment: incision,  drainage,  expression of material and  wound left open  Complications:  No  Specimens: No  Implants: No  Patient tolerance: Patient tolerated the procedure well with no immediate complications  Comments: Dressed with anti-bacterial ointment.         Labs Reviewed - No data to display       Imaging Results    None          Medical Decision Making:   History:   Old Medical Records: I decided to obtain old medical records.  ED Management:  Patient was interviewed and examined emergently.  Signs are stable She has a small area of cellulitis with middle fluctuance.  No evidence of crepitus, hemorrhagic bullae, pain out of proportion.  Incision and drainage was performed without complication.  She has had minimal improvement 2 tablets of Bactrim daily will be switched to oral doxycycline.  She is educated about supportive care for pain control at home.  She is asked to the area clean and dry follow up with the primary care doctor soon as possible regarding expected improvement.  She is asked to return to the ER for any new, concerning, or worsening symptoms.  Patient was agreeable with this plan for follow-up and she was discharged in stable condition.            Scribe Attestation:   Scribe #1: I performed the above scribed service and the documentation accurately describes the services I performed. I attest to the accuracy of the note.    I, Dr. Jaime Roberson, personally performed the services described in this documentation. All medical record entries made by the scribe were at my direction and in my presence.  I have reviewed the chart and agree that the record reflects my personal performance and is accurate and complete. Jaime Roberson MD.  9:14 PM 03/11/2019           Clinical Impression:       ICD-10-CM ICD-9-CM   1. Abscess of right leg L02.415 682.6         Disposition:   Disposition: Discharged  Condition: Stable                        Jaime Roberson MD  03/12/19 0425

## 2021-10-27 ENCOUNTER — HOSPITAL ENCOUNTER (EMERGENCY)
Facility: HOSPITAL | Age: 31
Discharge: HOME | End: 2021-10-28
Attending: EMERGENCY MEDICINE

## 2021-10-27 DIAGNOSIS — N39.0 URINARY TRACT INFECTION WITHOUT HEMATURIA, SITE UNSPECIFIED: ICD-10-CM

## 2021-10-27 DIAGNOSIS — T74.21XA SEXUAL ASSAULT OF ADULT, INITIAL ENCOUNTER: Primary | ICD-10-CM

## 2021-10-27 DIAGNOSIS — S60.512A ABRASION OF LEFT HAND, INITIAL ENCOUNTER: ICD-10-CM

## 2021-10-27 LAB
ALBUMIN SERPL BCP-MCNC: 4.7 G/DL (ref 3.5–5.2)
ALP SERPL-CCNC: 64 U/L (ref 55–135)
ALT SERPL W/O P-5'-P-CCNC: 27 U/L (ref 10–44)
ANION GAP SERPL CALC-SCNC: 10 MMOL/L (ref 8–16)
AST SERPL-CCNC: 17 U/L (ref 10–40)
B-HCG UR QL: NEGATIVE
BACTERIA #/AREA URNS HPF: ABNORMAL /HPF
BASOPHILS # BLD AUTO: 0.04 K/UL (ref 0–0.2)
BASOPHILS NFR BLD: 0.4 % (ref 0–1.9)
BILIRUB SERPL-MCNC: 0.9 MG/DL (ref 0.1–1)
BILIRUB UR QL STRIP: NEGATIVE
BUN SERPL-MCNC: 12 MG/DL (ref 6–20)
CALCIUM SERPL-MCNC: 9.2 MG/DL (ref 8.7–10.5)
CHLORIDE SERPL-SCNC: 105 MMOL/L (ref 95–110)
CLARITY UR: ABNORMAL
CO2 SERPL-SCNC: 24 MMOL/L (ref 23–29)
COLOR UR: YELLOW
CREAT SERPL-MCNC: 0.8 MG/DL (ref 0.5–1.4)
CTP QC/QA: YES
DIFFERENTIAL METHOD: NORMAL
EOSINOPHIL # BLD AUTO: 0.2 K/UL (ref 0–0.5)
EOSINOPHIL NFR BLD: 2.6 % (ref 0–8)
ERYTHROCYTE [DISTWIDTH] IN BLOOD BY AUTOMATED COUNT: 13.3 % (ref 11.5–14.5)
EST. GFR  (AFRICAN AMERICAN): >60 ML/MIN/1.73 M^2
EST. GFR  (NON AFRICAN AMERICAN): >60 ML/MIN/1.73 M^2
GLUCOSE SERPL-MCNC: 132 MG/DL (ref 70–110)
GLUCOSE UR QL STRIP: NEGATIVE
HCT VFR BLD AUTO: 39.3 % (ref 37–48.5)
HGB BLD-MCNC: 13.2 G/DL (ref 12–16)
HGB UR QL STRIP: NEGATIVE
HYALINE CASTS #/AREA URNS LPF: 181 /LPF
IMM GRANULOCYTES # BLD AUTO: 0.04 K/UL (ref 0–0.04)
IMM GRANULOCYTES NFR BLD AUTO: 0.4 % (ref 0–0.5)
KETONES UR QL STRIP: ABNORMAL
LEUKOCYTE ESTERASE UR QL STRIP: ABNORMAL
LYMPHOCYTES # BLD AUTO: 2.4 K/UL (ref 1–4.8)
LYMPHOCYTES NFR BLD: 26.6 % (ref 18–48)
MCH RBC QN AUTO: 27.4 PG (ref 27–31)
MCHC RBC AUTO-ENTMCNC: 33.6 G/DL (ref 32–36)
MCV RBC AUTO: 82 FL (ref 82–98)
MICROSCOPIC COMMENT: ABNORMAL
MONOCYTES # BLD AUTO: 0.6 K/UL (ref 0.3–1)
MONOCYTES NFR BLD: 7 % (ref 4–15)
NEUTROPHILS # BLD AUTO: 5.8 K/UL (ref 1.8–7.7)
NEUTROPHILS NFR BLD: 63 % (ref 38–73)
NITRITE UR QL STRIP: NEGATIVE
NRBC BLD-RTO: 0 /100 WBC
PH UR STRIP: 6 [PH] (ref 5–8)
PLATELET # BLD AUTO: 316 K/UL (ref 150–450)
PMV BLD AUTO: 9.4 FL (ref 9.2–12.9)
POTASSIUM SERPL-SCNC: 3.5 MMOL/L (ref 3.5–5.1)
PROT SERPL-MCNC: 7.5 G/DL (ref 6–8.4)
PROT UR QL STRIP: ABNORMAL
RBC # BLD AUTO: 4.82 M/UL (ref 4–5.4)
RBC #/AREA URNS HPF: 16 /HPF (ref 0–4)
SODIUM SERPL-SCNC: 139 MMOL/L (ref 136–145)
SP GR UR STRIP: >1.03 (ref 1–1.03)
SQUAMOUS #/AREA URNS HPF: 10 /HPF
URN SPEC COLLECT METH UR: ABNORMAL
UROBILINOGEN UR STRIP-ACNC: ABNORMAL EU/DL
WBC # BLD AUTO: 9.17 K/UL (ref 3.9–12.7)
WBC #/AREA URNS HPF: 66 /HPF (ref 0–5)

## 2021-10-27 PROCEDURE — 86592 SYPHILIS TEST NON-TREP QUAL: CPT | Performed by: EMERGENCY MEDICINE

## 2021-10-27 PROCEDURE — 63600175 PHARM REV CODE 636 W HCPCS: Performed by: EMERGENCY MEDICINE

## 2021-10-27 PROCEDURE — 86703 HIV-1/HIV-2 1 RESULT ANTBDY: CPT | Performed by: EMERGENCY MEDICINE

## 2021-10-27 PROCEDURE — 90715 TDAP VACCINE 7 YRS/> IM: CPT | Performed by: EMERGENCY MEDICINE

## 2021-10-27 PROCEDURE — 63700000 PHARM REV CODE 250 ALT 637 W/O HCPCS: Performed by: EMERGENCY MEDICINE

## 2021-10-27 PROCEDURE — 81025 URINE PREGNANCY TEST: CPT | Performed by: EMERGENCY MEDICINE

## 2021-10-27 PROCEDURE — 90471 IMMUNIZATION ADMIN: CPT | Performed by: EMERGENCY MEDICINE

## 2021-10-27 PROCEDURE — 81001 URINALYSIS AUTO W/SCOPE: CPT | Performed by: EMERGENCY MEDICINE

## 2021-10-27 PROCEDURE — 99284 EMERGENCY DEPT VISIT MOD MDM: CPT

## 2021-10-27 PROCEDURE — 87086 URINE CULTURE/COLONY COUNT: CPT | Performed by: EMERGENCY MEDICINE

## 2021-10-27 PROCEDURE — 25000003 PHARM REV CODE 250: Performed by: EMERGENCY MEDICINE

## 2021-10-27 PROCEDURE — 96372 THER/PROPH/DIAG INJ SC/IM: CPT

## 2021-10-27 PROCEDURE — 80074 ACUTE HEPATITIS PANEL: CPT | Performed by: EMERGENCY MEDICINE

## 2021-10-27 PROCEDURE — 80053 COMPREHEN METABOLIC PANEL: CPT | Performed by: EMERGENCY MEDICINE

## 2021-10-27 PROCEDURE — 85025 COMPLETE CBC W/AUTO DIFF WBC: CPT | Performed by: EMERGENCY MEDICINE

## 2021-10-27 RX ORDER — NITROFURANTOIN 25; 75 MG/1; MG/1
100 CAPSULE ORAL 2 TIMES DAILY
Qty: 10 CAPSULE | Refills: 0 | Status: SHIPPED | OUTPATIENT
Start: 2021-10-27 | End: 2021-10-27 | Stop reason: SDUPTHER

## 2021-10-27 RX ORDER — EMTRICITABINE AND TENOFOVIR DISOPROXIL FUMARATE 200; 300 MG/1; MG/1
1 TABLET, FILM COATED ORAL ONCE
Status: DISCONTINUED | OUTPATIENT
Start: 2021-10-27 | End: 2021-10-28 | Stop reason: HOSPADM

## 2021-10-27 RX ORDER — LEVONORGESTREL 1.5 MG/1
1.5 TABLET ORAL ONCE
Status: DISCONTINUED | OUTPATIENT
Start: 2021-10-27 | End: 2021-10-28 | Stop reason: HOSPADM

## 2021-10-27 RX ORDER — ONDANSETRON 4 MG/1
4 TABLET, ORALLY DISINTEGRATING ORAL
Status: DISCONTINUED | OUTPATIENT
Start: 2021-10-27 | End: 2021-10-28 | Stop reason: HOSPADM

## 2021-10-27 RX ORDER — LEVONORGESTREL 1.5 MG/1
1.5 TABLET ORAL ONCE
Qty: 1 TABLET | Refills: 0 | Status: SHIPPED | OUTPATIENT
Start: 2021-10-27 | End: 2021-10-27 | Stop reason: SDUPTHER

## 2021-10-27 RX ORDER — NITROFURANTOIN 25; 75 MG/1; MG/1
100 CAPSULE ORAL 2 TIMES DAILY
Qty: 10 CAPSULE | Refills: 0 | Status: SHIPPED | OUTPATIENT
Start: 2021-10-27 | End: 2021-11-01

## 2021-10-27 RX ORDER — EMTRICITABINE AND TENOFOVIR DISOPROXIL FUMARATE 200; 300 MG/1; MG/1
1 TABLET, FILM COATED ORAL DAILY
Qty: 28 TABLET | Refills: 0 | Status: SHIPPED | OUTPATIENT
Start: 2021-10-27 | End: 2022-07-27

## 2021-10-27 RX ORDER — METRONIDAZOLE 250 MG/1
2000 TABLET ORAL
Status: COMPLETED | OUTPATIENT
Start: 2021-10-27 | End: 2021-10-27

## 2021-10-27 RX ORDER — ONDANSETRON 4 MG/1
4 TABLET, ORALLY DISINTEGRATING ORAL EVERY 8 HOURS PRN
Qty: 12 TABLET | Refills: 0 | Status: SHIPPED | OUTPATIENT
Start: 2021-10-27 | End: 2022-07-27

## 2021-10-27 RX ORDER — AZITHROMYCIN 250 MG/1
2000 TABLET, FILM COATED ORAL
Status: COMPLETED | OUTPATIENT
Start: 2021-10-27 | End: 2021-10-27

## 2021-10-27 RX ORDER — GENTAMICIN SULFATE 40 MG/ML
240 INJECTION, SOLUTION INTRAMUSCULAR; INTRAVENOUS ONCE
Status: COMPLETED | OUTPATIENT
Start: 2021-10-27 | End: 2021-10-27

## 2021-10-27 RX ORDER — LEVONORGESTREL 1.5 MG/1
1.5 TABLET ORAL DAILY
Qty: 2 TABLET | Refills: 0 | Status: SHIPPED | OUTPATIENT
Start: 2021-10-27 | End: 2022-07-27

## 2021-10-27 RX ADMIN — METRONIDAZOLE 2000 MG: 250 TABLET ORAL at 11:10

## 2021-10-27 RX ADMIN — AZITHROMYCIN MONOHYDRATE 2000 MG: 250 TABLET ORAL at 10:10

## 2021-10-27 RX ADMIN — CLOSTRIDIUM TETANI TOXOID ANTIGEN (FORMALDEHYDE INACTIVATED), CORYNEBACTERIUM DIPHTHERIAE TOXOID ANTIGEN (FORMALDEHYDE INACTIVATED), BORDETELLA PERTUSSIS TOXOID ANTIGEN (GLUTARALDEHYDE INACTIVATED), BORDETELLA PERTUSSIS FILAMENTOUS HEMAGGLUTININ ANTIGEN (FORMALDEHYDE INACTIVATED), BORDETELLA PERTUSSIS PERTACTIN ANTIGEN, AND BORDETELLA PERTUSSIS FIMBRIAE 2/3 ANTIGEN 0.5 ML: 5; 2; 2.5; 5; 3; 5 INJECTION, SUSPENSION INTRAMUSCULAR at 10:10

## 2021-10-27 RX ADMIN — GENTAMICIN SULFATE 240 MG: 40 INJECTION, SOLUTION INTRAMUSCULAR; INTRAVENOUS at 10:10

## 2021-10-28 VITALS
HEIGHT: 62 IN | BODY MASS INDEX: 39.56 KG/M2 | HEART RATE: 83 BPM | TEMPERATURE: 98 F | RESPIRATION RATE: 18 BRPM | DIASTOLIC BLOOD PRESSURE: 85 MMHG | WEIGHT: 215 LBS | SYSTOLIC BLOOD PRESSURE: 132 MMHG | OXYGEN SATURATION: 96 %

## 2021-10-28 LAB
HIV1+2 IGG SERPL QL IA.RAPID: NEGATIVE
RPR SER QL: NORMAL

## 2021-10-29 LAB
BACTERIA UR CULT: NORMAL
BACTERIA UR CULT: NORMAL
HAV IGM SERPL QL IA: NEGATIVE
HBV CORE IGM SERPL QL IA: NEGATIVE
HBV SURFACE AG SERPL QL IA: NEGATIVE
HCV AB S/CO SERPL IA: <0.1 S/CO RATIO (ref 0–0.9)

## 2022-07-19 ENCOUNTER — HOSPITAL ENCOUNTER (EMERGENCY)
Facility: HOSPITAL | Age: 32
Discharge: HOME | End: 2022-07-19
Attending: EMERGENCY MEDICINE
Payer: MEDICAID

## 2022-07-19 VITALS
RESPIRATION RATE: 19 BRPM | OXYGEN SATURATION: 98 % | HEIGHT: 62 IN | WEIGHT: 215 LBS | SYSTOLIC BLOOD PRESSURE: 133 MMHG | BODY MASS INDEX: 39.56 KG/M2 | HEART RATE: 76 BPM | DIASTOLIC BLOOD PRESSURE: 75 MMHG | TEMPERATURE: 98 F

## 2022-07-19 DIAGNOSIS — S39.012A BACK STRAIN, INITIAL ENCOUNTER: Primary | ICD-10-CM

## 2022-07-19 DIAGNOSIS — E83.59 NEPHROCALCINOSIS: ICD-10-CM

## 2022-07-19 DIAGNOSIS — N29 NEPHROCALCINOSIS: ICD-10-CM

## 2022-07-19 LAB
ALBUMIN SERPL BCP-MCNC: 4.7 G/DL (ref 3.5–5.2)
ALP SERPL-CCNC: 62 U/L (ref 55–135)
ALT SERPL W/O P-5'-P-CCNC: 34 U/L (ref 10–44)
ANION GAP SERPL CALC-SCNC: 8 MMOL/L (ref 8–16)
AST SERPL-CCNC: 18 U/L (ref 10–40)
B-HCG UR QL: NEGATIVE
BACTERIA #/AREA URNS HPF: ABNORMAL /HPF
BASOPHILS # BLD AUTO: 0.05 K/UL (ref 0–0.2)
BASOPHILS NFR BLD: 0.6 % (ref 0–1.9)
BILIRUB SERPL-MCNC: 0.9 MG/DL (ref 0.1–1)
BILIRUB UR QL STRIP: NEGATIVE
BUN SERPL-MCNC: 11 MG/DL (ref 6–20)
CALCIUM SERPL-MCNC: 9.3 MG/DL (ref 8.7–10.5)
CHLORIDE SERPL-SCNC: 98 MMOL/L (ref 95–110)
CLARITY UR: ABNORMAL
CO2 SERPL-SCNC: 27 MMOL/L (ref 23–29)
COLOR UR: YELLOW
CREAT SERPL-MCNC: 0.6 MG/DL (ref 0.5–1.4)
CTP QC/QA: YES
DIFFERENTIAL METHOD: ABNORMAL
EOSINOPHIL # BLD AUTO: 0.2 K/UL (ref 0–0.5)
EOSINOPHIL NFR BLD: 2.8 % (ref 0–8)
ERYTHROCYTE [DISTWIDTH] IN BLOOD BY AUTOMATED COUNT: 13.8 % (ref 11.5–14.5)
EST. GFR  (AFRICAN AMERICAN): >60 ML/MIN/1.73 M^2
EST. GFR  (NON AFRICAN AMERICAN): >60 ML/MIN/1.73 M^2
GLUCOSE SERPL-MCNC: 120 MG/DL (ref 70–110)
GLUCOSE UR QL STRIP: NEGATIVE
HCT VFR BLD AUTO: 42.3 % (ref 37–48.5)
HGB BLD-MCNC: 14 G/DL (ref 12–16)
HGB UR QL STRIP: ABNORMAL
HYALINE CASTS #/AREA URNS LPF: 96 /LPF
IMM GRANULOCYTES # BLD AUTO: 0.06 K/UL (ref 0–0.04)
IMM GRANULOCYTES NFR BLD AUTO: 0.8 % (ref 0–0.5)
KETONES UR QL STRIP: NEGATIVE
LEUKOCYTE ESTERASE UR QL STRIP: NEGATIVE
LYMPHOCYTES # BLD AUTO: 2.1 K/UL (ref 1–4.8)
LYMPHOCYTES NFR BLD: 26.3 % (ref 18–48)
MCH RBC QN AUTO: 27.2 PG (ref 27–31)
MCHC RBC AUTO-ENTMCNC: 33.1 G/DL (ref 32–36)
MCV RBC AUTO: 82 FL (ref 82–98)
MICROSCOPIC COMMENT: ABNORMAL
MONOCYTES # BLD AUTO: 0.5 K/UL (ref 0.3–1)
MONOCYTES NFR BLD: 6.2 % (ref 4–15)
NEUTROPHILS # BLD AUTO: 5 K/UL (ref 1.8–7.7)
NEUTROPHILS NFR BLD: 63.3 % (ref 38–73)
NITRITE UR QL STRIP: NEGATIVE
NRBC BLD-RTO: 0 /100 WBC
PH UR STRIP: 7 [PH] (ref 5–8)
PLATELET # BLD AUTO: 331 K/UL (ref 150–450)
PMV BLD AUTO: 9.2 FL (ref 9.2–12.9)
POTASSIUM SERPL-SCNC: 4 MMOL/L (ref 3.5–5.1)
PROT SERPL-MCNC: 7.7 G/DL (ref 6–8.4)
PROT UR QL STRIP: ABNORMAL
RBC # BLD AUTO: 5.15 M/UL (ref 4–5.4)
RBC #/AREA URNS HPF: 1 /HPF (ref 0–4)
SODIUM SERPL-SCNC: 133 MMOL/L (ref 136–145)
SP GR UR STRIP: 1.02 (ref 1–1.03)
SQUAMOUS #/AREA URNS HPF: 7 /HPF
URN SPEC COLLECT METH UR: ABNORMAL
UROBILINOGEN UR STRIP-ACNC: NEGATIVE EU/DL
WBC # BLD AUTO: 7.9 K/UL (ref 3.9–12.7)
WBC #/AREA URNS HPF: 1 /HPF (ref 0–5)

## 2022-07-19 PROCEDURE — 81025 URINE PREGNANCY TEST: CPT | Performed by: EMERGENCY MEDICINE

## 2022-07-19 PROCEDURE — 99284 EMERGENCY DEPT VISIT MOD MDM: CPT | Mod: 25

## 2022-07-19 PROCEDURE — 81001 URINALYSIS AUTO W/SCOPE: CPT | Performed by: EMERGENCY MEDICINE

## 2022-07-19 PROCEDURE — 63600175 PHARM REV CODE 636 W HCPCS: Performed by: EMERGENCY MEDICINE

## 2022-07-19 PROCEDURE — 80053 COMPREHEN METABOLIC PANEL: CPT | Performed by: EMERGENCY MEDICINE

## 2022-07-19 PROCEDURE — 85025 COMPLETE CBC W/AUTO DIFF WBC: CPT | Performed by: EMERGENCY MEDICINE

## 2022-07-19 PROCEDURE — 25000003 PHARM REV CODE 250: Performed by: EMERGENCY MEDICINE

## 2022-07-19 PROCEDURE — 96372 THER/PROPH/DIAG INJ SC/IM: CPT | Performed by: EMERGENCY MEDICINE

## 2022-07-19 RX ORDER — METHOCARBAMOL 500 MG/1
1000 TABLET, FILM COATED ORAL 3 TIMES DAILY PRN
Qty: 30 TABLET | Refills: 0 | Status: SHIPPED | OUTPATIENT
Start: 2022-07-19 | End: 2022-07-24

## 2022-07-19 RX ORDER — ORPHENADRINE CITRATE 30 MG/ML
60 INJECTION INTRAMUSCULAR; INTRAVENOUS
Status: COMPLETED | OUTPATIENT
Start: 2022-07-19 | End: 2022-07-19

## 2022-07-19 RX ORDER — KETOROLAC TROMETHAMINE 30 MG/ML
30 INJECTION, SOLUTION INTRAMUSCULAR; INTRAVENOUS
Status: COMPLETED | OUTPATIENT
Start: 2022-07-19 | End: 2022-07-19

## 2022-07-19 RX ORDER — OXYCODONE AND ACETAMINOPHEN 5; 325 MG/1; MG/1
1 TABLET ORAL
Status: COMPLETED | OUTPATIENT
Start: 2022-07-19 | End: 2022-07-19

## 2022-07-19 RX ADMIN — OXYCODONE AND ACETAMINOPHEN 1 TABLET: 325; 5 TABLET ORAL at 05:07

## 2022-07-19 RX ADMIN — KETOROLAC TROMETHAMINE 30 MG: 30 INJECTION, SOLUTION INTRAMUSCULAR at 02:07

## 2022-07-19 RX ADMIN — ORPHENADRINE CITRATE 60 MG: 60 INJECTION INTRAMUSCULAR; INTRAVENOUS at 05:07

## 2022-07-19 NOTE — ED PROVIDER NOTES
Encounter Date: 2022       History     Chief Complaint   Patient presents with    Back Pain     Patient reports L sided back pain x a few days      Patient reports history of kidney stones approximately 20 years ago.  Patient awoke this morning with right mid back pain.  Pain increases with movement.  No nausea vomiting.  No dysuria.  No hematuria.  No pleurisy hemoptysis.  Patient denies any dysuria.  There is no shortness of breath, cough or congestion.        Review of patient's allergies indicates:   Allergen Reactions    Keflex [cephalexin]      rash    Latex, natural rubber Hives     Past Medical History:   Diagnosis Date    Renal disorder     kidney stone     Past Surgical History:   Procedure Laterality Date     SECTION, CLASSIC      WRIST ARTHROSCOPY W/ TRIANGULAR FIBROCARTILAGE REPAIR       Family History   Problem Relation Age of Onset    Hyperlipidemia Father     Hypertension Father     Diabetes Father     Cancer Maternal Grandmother     Heart disease Maternal Grandmother     Leukemia Maternal Grandmother      Social History     Tobacco Use    Smoking status: Never Smoker    Smokeless tobacco: Never Used   Substance Use Topics    Alcohol use: Yes     Comment: occasionally    Drug use: No     Review of Systems   Constitutional: Negative for chills and fever.   HENT: Negative for congestion.    Eyes: Negative for visual disturbance.   Respiratory: Negative for shortness of breath.    Cardiovascular: Negative for chest pain and palpitations.   Gastrointestinal: Negative for abdominal pain and vomiting.   Genitourinary: Negative for dysuria.   Musculoskeletal: Negative for joint swelling.        Full strength and sensation all extremities.  There is palpable muscle spasm and point tenderness to left paralumbar tenderness in area of L1.  Palpation reproduces pain exactly.   Neurological: Negative for headaches.   Psychiatric/Behavioral: Negative for confusion.       Physical Exam      Initial Vitals [07/19/22 1359]   BP Pulse Resp Temp SpO2   (!) 157/97 77 18 98.1 °F (36.7 °C) 98 %      MAP       --         Physical Exam    Nursing note and vitals reviewed.  Constitutional: She is not diaphoretic. No distress.   HENT:   Head: Normocephalic and atraumatic.   Eyes: Conjunctivae are normal.   Neck:   Normal range of motion.  Cardiovascular: Normal rate.   Pulmonary/Chest: Breath sounds normal.   Abdominal: Abdomen is soft. There is no abdominal tenderness.   Musculoskeletal:         General: Normal range of motion.      Cervical back: Normal range of motion.      Comments: Full strength and sensation all extremities.  There is palpable muscle spasm and point tenderness to left paralumbar tenderness in area of L1.  Palpation reproduces pain exactly.       Neurological: She is alert.   No gross deficits   Skin: No rash noted.   Psychiatric: She has a normal mood and affect.         ED Course   Procedures  Labs Reviewed   URINALYSIS, REFLEX TO URINE CULTURE - Abnormal; Notable for the following components:       Result Value    Appearance, UA Hazy (*)     Protein, UA Trace (*)     Occult Blood UA 1+ (*)     All other components within normal limits    Narrative:     Specimen Source->Urine   URINALYSIS MICROSCOPIC - Abnormal; Notable for the following components:    Hyaline Casts, UA 96 (*)     All other components within normal limits    Narrative:     Specimen Source->Urine   CBC W/ AUTO DIFFERENTIAL - Abnormal; Notable for the following components:    Immature Granulocytes 0.8 (*)     Immature Grans (Abs) 0.06 (*)     All other components within normal limits   COMPREHENSIVE METABOLIC PANEL - Abnormal; Notable for the following components:    Sodium 133 (*)     Glucose 120 (*)     All other components within normal limits   POCT URINE PREGNANCY          Imaging Results          CT Renal Stone Study ABD Pelvis WO (Final result)  Result time 07/19/22 16:38:30    Final result by Fredy Pedraza MD  (07/19/22 16:38:30)                 Narrative:    CMS MANDATED QUALITY DATA - CT RADIATION  436    All CT scans at this facility utilize dose modulation, iterative reconstruction, and/or weight based dosing when appropriate to reduce radiation dose to as low as reasonably achievable.    CT ABDOMEN PELVIS WITHOUT IV CONTRAST    CLINICAL HISTORY:  32 years Female Flank pain, kidney stone suspected    COMPARISON: None    FINDINGS: Lung bases are clear. Bone window images demonstrate no acute or aggressive osseous abnormality.    No focal hepatic lesion. Gallbladder and biliary tree are unremarkable. Spleen is unremarkable. Pancreas appears normal. No adrenal lesion. Bilateral medullary nephrocalcinosis. Ureters are normal in caliber. Urinary bladder is collapsed.    Stomach is unremarkable. No evidence of small bowel obstruction. Normal appendix. No evidence of colitis.    No free fluid or free air within the abdomen or pelvis. Uterus is unremarkable. Right adnexal cyst. No pathologically enlarged lymph nodes within the abdomen or pelvis.    IMPRESSION:    Bilateral medullary nephrocalcinosis.    Negative for obstructive uropathy.    Right adnexal cyst.    Electronically signed by:  Fredy Pedraza MD  7/19/2022 4:38 PM CDT Workstation: [id number]                               Medications   ketorolac injection 30 mg (30 mg Intramuscular Given 7/19/22 1429)   orphenadrine injection 60 mg (60 mg Intramuscular Given 7/19/22 1728)   oxyCODONE-acetaminophen 5-325 mg per tablet 1 tablet (1 tablet Oral Given 7/19/22 1728)     Medical Decision Making:   History:   Old Medical Records: I decided to obtain old medical records.  Clinical Tests:   Lab Tests: Reviewed  Radiological Study: Reviewed  ED Management:  Patient presents with musculoskeletal back pain.  Patient was concerned about possible kidney stone.  Urinalysis with microscopic protein, hematuria and hyaline cast.  Imaging obtained.  Patient does have medullary  nephrocalcinosis.  Renal function is normal.  Will refer to nephrology for further evaluation.  Will treat with Robaxin.                      Clinical Impression:   Final diagnoses:  [S39.012A] Back strain, initial encounter (Primary)  [E83.59, N29] Nephrocalcinosis          ED Disposition Condition    Discharge Stable        ED Prescriptions     Medication Sig Dispense Start Date End Date Auth. Provider    methocarbamoL (ROBAXIN) 500 MG Tab () Take 2 tablets (1,000 mg total) by mouth 3 (three) times daily as needed. 30 tablet 2022 Phillip Valverde MD        Follow-up Information     Follow up With Specialties Details Why Contact Info Additional Information    [facility] - Emergency Dept Emergency Medicine  If symptoms worsen [address] [id number] [phone] 1st floor    Anton Pollock MD Nephrology Schedule an appointment as soon as possible for a visit  Further evaluation of nephrocalcinosis [address] [facility]  [facility] [address]  [phone]       Jr Correa MD Physical Medicine and Rehabilitation Schedule an appointment as soon as possible for a visit  Further evaluation back pain [address] [place] [address] DR  [address]  [facility] [address]  [phone]              Phillip Valverde MD  22 1740       Phillip Valverde MD  22 1760

## 2022-07-22 ENCOUNTER — TELEPHONE (OUTPATIENT)
Dept: FAMILY MEDICINE | Facility: CLINIC | Age: 32
End: 2022-07-22
Payer: MEDICAID

## 2022-07-22 NOTE — TELEPHONE ENCOUNTER
----- Message from Lani Pérez sent at 7/22/2022  3:34 PM CDT -----  Type:  Patient Call Back    Who Called: Pt     What is the reqeust in detail: pt is requesting a call back in regards to scheduling a np appt, no solution was found due to medicaid ins. Please Advise     Can the clinic reply by [facility]?    Best Call Back [phone]

## 2022-07-27 ENCOUNTER — OFFICE VISIT (OUTPATIENT)
Dept: FAMILY MEDICINE | Facility: CLINIC | Age: 32
End: 2022-07-27
Payer: MEDICAID

## 2022-07-27 ENCOUNTER — HOSPITAL ENCOUNTER (OUTPATIENT)
Dept: RADIOLOGY | Facility: CLINIC | Age: 32
Discharge: HOME | End: 2022-07-27
Attending: PHYSICIAN ASSISTANT
Payer: MEDICAID

## 2022-07-27 VITALS
DIASTOLIC BLOOD PRESSURE: 82 MMHG | OXYGEN SATURATION: 97 % | SYSTOLIC BLOOD PRESSURE: 122 MMHG | HEIGHT: 64 IN | HEART RATE: 88 BPM | WEIGHT: 220 LBS | BODY MASS INDEX: 37.56 KG/M2 | TEMPERATURE: 98 F

## 2022-07-27 DIAGNOSIS — M54.9 DORSALGIA, UNSPECIFIED: ICD-10-CM

## 2022-07-27 PROCEDURE — 72100 X-RAY EXAM L-S SPINE 2/3 VWS: CPT | Mod: S$GLB,,, | Performed by: RADIOLOGY

## 2022-07-27 PROCEDURE — 3074F SYST BP LT 130 MM HG: CPT | Mod: CPTII,S$GLB,, | Performed by: PHYSICIAN ASSISTANT

## 2022-07-27 PROCEDURE — 1159F MED LIST DOCD IN RCRD: CPT | Mod: CPTII,S$GLB,, | Performed by: PHYSICIAN ASSISTANT

## 2022-07-27 PROCEDURE — 3008F PR BODY MASS INDEX (BMI) DOCUMENTED: ICD-10-PCS | Mod: CPTII,S$GLB,, | Performed by: PHYSICIAN ASSISTANT

## 2022-07-27 PROCEDURE — 99203 OFFICE O/P NEW LOW 30 MIN: CPT | Mod: S$GLB,,, | Performed by: PHYSICIAN ASSISTANT

## 2022-07-27 PROCEDURE — 72100 XR LUMBAR SPINE 2 OR 3 VIEWS: ICD-10-PCS | Mod: S$GLB,,, | Performed by: RADIOLOGY

## 2022-07-27 PROCEDURE — 1159F PR MEDICATION LIST DOCUMENTED IN MEDICAL RECORD: ICD-10-PCS | Mod: CPTII,S$GLB,, | Performed by: PHYSICIAN ASSISTANT

## 2022-07-27 PROCEDURE — 3079F PR MOST RECENT DIASTOLIC BLOOD PRESSURE 80-89 MM HG: ICD-10-PCS | Mod: CPTII,S$GLB,, | Performed by: PHYSICIAN ASSISTANT

## 2022-07-27 PROCEDURE — 99203 PR OFFICE/OUTPT VISIT, NEW, LEVL III, 30-44 MIN: ICD-10-PCS | Mod: S$GLB,,, | Performed by: PHYSICIAN ASSISTANT

## 2022-07-27 PROCEDURE — 3079F DIAST BP 80-89 MM HG: CPT | Mod: CPTII,S$GLB,, | Performed by: PHYSICIAN ASSISTANT

## 2022-07-27 PROCEDURE — 3008F BODY MASS INDEX DOCD: CPT | Mod: CPTII,S$GLB,, | Performed by: PHYSICIAN ASSISTANT

## 2022-07-27 PROCEDURE — 3074F PR MOST RECENT SYSTOLIC BLOOD PRESSURE < 130 MM HG: ICD-10-PCS | Mod: CPTII,S$GLB,, | Performed by: PHYSICIAN ASSISTANT

## 2022-07-27 RX ORDER — PREDNISONE 10 MG/1
TABLET ORAL
Qty: 18 TABLET | Refills: 0 | Status: SHIPPED | OUTPATIENT
Start: 2022-07-27 | End: 2022-10-19

## 2022-07-27 RX ORDER — CYCLOBENZAPRINE HCL 10 MG
10 TABLET ORAL NIGHTLY
Qty: 30 TABLET | Refills: 0 | Status: SHIPPED | OUTPATIENT
Start: 2022-07-27 | End: 2022-08-26

## 2022-07-27 NOTE — MEDICAL/APP STUDENT
Subjective:       Patient ID: Patricia James is a 32 y.o. female.    Chief Complaint: Establish Care (S/P ER visit at [facility] for back pain)    Patricia James is a 32 year old female in clinic for a hospital follow-up.  Patient was seen at [facility] ED on 07/19/2022 for increasing left mid-back pain.  She was given a Toradol and Orphenadrine injection which temporarily relieved the pain.  A CT was done which resulted in bilateral medullary nephrocalcinosis and right adnexal cyst.  She was discharged with Robaxin 500 mg.  Today, patient continues to complain of left back pain described as sharp and stabbing.  She states pain began spontaneously 2 weeks ago and is worsened by moving and bending.  She does not find relief with Robaxin or Ibuprofen.  Patient denies fever, chills, fatigue, cough, nausea, vomiting, shortness of breath, chest pain, dysuria, and hematuria.  Since the ED, she has been avoiding calcium intake.  Patient is scheduled to see a nephrologist September 20th.      Review of Systems   Constitutional: Positive for activity change. Negative for chills, fatigue and fever.   HENT: Negative for nasal congestion, postnasal drip, rhinorrhea, sinus pressure/congestion and sore throat.    Eyes: Negative.    Respiratory: Negative for cough, shortness of breath and wheezing.    Cardiovascular: Negative for chest pain and palpitations.   Gastrointestinal: Negative for abdominal pain, constipation, diarrhea, nausea and vomiting.   Genitourinary: Negative for difficulty urinating, dysuria, frequency, hematuria and urgency.   Musculoskeletal: Positive for back pain (left mid-back ). Negative for arthralgias, leg pain, myalgias and neck pain.   Neurological: Negative for dizziness, seizures, weakness, numbness and headaches.   Psychiatric/Behavioral: Negative.      Past Medical History:   Diagnosis Date    Renal disorder     kidney stone     Past Surgical History:   Procedure Laterality  Date     SECTION, CLASSIC      WRIST ARTHROSCOPY W/ TRIANGULAR FIBROCARTILAGE REPAIR       Review of patient's allergies indicates:   Allergen Reactions    Keflex [cephalexin]      rash    Latex, natural rubber Hives     Current Outpatient Medications on File Prior to Visit   Medication Sig Dispense Refill    [DISCONTINUED] emtricitabine-tenofovir 200-300 mg (TRUVADA) 200-300 mg Tab Take 1 tablet by mouth once daily. (Patient not taking: Reported on 2022) 28 tablet 0    [DISCONTINUED] ibuprofen (ADVIL,MOTRIN) 800 MG tablet Take 1 tablet (800 mg total) by mouth every 6 (six) hours as needed for Pain. (Patient not taking: Reported on 2022) 20 tablet 0    [DISCONTINUED] levonorgestreL (PLAN B ONE-STEP) 1.5 mg Tab tablet Take 1 tablet (1.5 mg total) by mouth once daily. Take 1 dose now and another dose in 24 hours for 2 days (Patient not taking: Reported on 2022) 2 tablet 0    [DISCONTINUED] mupirocin (BACTROBAN) 2 % ointment Apply topically 3 (three) times daily.      [DISCONTINUED] ondansetron (ZOFRAN-ODT) 4 MG TbDL Take 1 tablet (4 mg total) by mouth every 8 (eight) hours as needed (nausea). (Patient not taking: Reported on 2022) 12 tablet 0    [DISCONTINUED] raltegravir (ISENTRESS) 400 mg tablet Take 1 tablet (400 mg total) by mouth 2 (two) times daily. (Patient not taking: Reported on 2022) 56 tablet 0    [DISCONTINUED] sulfamethoxazole/trimethoprim (BACTRIM ORAL) Take by mouth.       No current facility-administered medications on file prior to visit.     Social History     Socioeconomic History    Marital status: Legally    Tobacco Use    Smoking status: Never Smoker    Smokeless tobacco: Never Used   Substance and Sexual Activity    Alcohol use: Yes     Comment: occasionally    Drug use: No    Sexual activity: Yes     Partners: Male     Family History   Problem Relation Age of Onset    Hyperlipidemia Father     Hypertension Father     Diabetes Father      Cancer Maternal Grandmother     Heart disease Maternal Grandmother     Leukemia Maternal Grandmother          Objective:      Physical Exam  Constitutional:       General: She is not in acute distress.     Appearance: Normal appearance. She is obese. She is ill-appearing (uncomfortable when sitting).   HENT:      Head: Normocephalic and atraumatic.      Right Ear: Tympanic membrane, ear canal and external ear normal.      Left Ear: Tympanic membrane, ear canal and external ear normal.      Nose: Nose normal. No congestion or rhinorrhea.      Mouth/Throat:      Mouth: Mucous membranes are moist.      Pharynx: Oropharynx is clear. No oropharyngeal exudate or posterior oropharyngeal erythema.   Eyes:      Extraocular Movements: Extraocular movements intact.      Conjunctiva/sclera: Conjunctivae normal.      Pupils: Pupils are equal, round, and reactive to light.   Cardiovascular:      Rate and Rhythm: Normal rate and regular rhythm.      Pulses: Normal pulses.      Heart sounds: No murmur heard.    No friction rub. No gallop.   Pulmonary:      Effort: Pulmonary effort is normal. No respiratory distress.      Breath sounds: Normal breath sounds. No wheezing, rhonchi or rales.   Abdominal:      General: Abdomen is flat. Bowel sounds are normal.      Palpations: Abdomen is soft.      Tenderness: There is no abdominal tenderness. There is no guarding.   Musculoskeletal:         General: No tenderness. Normal range of motion.      Cervical back: Normal range of motion and neck supple. No tenderness.      Right lower leg: No edema.      Left lower leg: No edema.   Lymphadenopathy:      Cervical: No cervical adenopathy.   Skin:     General: Skin is warm and dry.      Capillary Refill: Capillary refill takes less than 2 seconds.   Neurological:      General: No focal deficit present.      Mental Status: She is alert and oriented to person, place, and time.      Motor: No weakness.      Comments: Extremely tender left  "back pain along lumbar spine radiating to the left flank   Psychiatric:         Mood and Affect: Mood normal.         Behavior: Behavior normal.         Thought Content: Thought content normal.         Judgment: Judgment normal.         Vitals:    07/27/22 1019   BP: 122/82   Pulse: 88   Temp: 97.7 °F (36.5 °C)   TempSrc: Oral   SpO2: 97%   Weight: 99.8 kg (220 lb 0.3 oz)   Height: 5' 4" (1.626 m)      Assessment:       Problem List Items Addressed This Visit    None     Visit Diagnoses     Dorsalgia, unspecified        Relevant Orders    X-Ray Lumbar Spine 2 Or 3 Views          Plan:       Dorsalgia   - X-Ray Lumbar Spine    - Prednisone 10 mg 3 tablets x3 days and taper down   - Cyclobenzaprine 10 mg nightly     Lumbar X-ray completed in clinic and will call with results.  Patient advised to keep Nephrology appointment in September and to follow-up in clinic in 1 week.  If no improvement, will recommend physical therapy.     "

## 2022-07-27 NOTE — PROGRESS NOTES
Patient ID: Patricia James is a 32 y.o. female.     Chief Complaint: Establish Care (S/P ER visit at [facility] for back pain)     Patricia James is a 32 year old female in clinic for a hospital follow-up.  Patient was seen at [facility] ED on 07/19/2022 for increasing left mid-back pain.  She was given a Toradol and Orphenadrine injection which temporarily relieved the pain.  A CT was done which resulted in bilateral medullary nephrocalcinosis and right adnexal cyst.  She was discharged with Robaxin 500 mg.  Today, patient continues to complain of left back pain described as sharp and stabbing.  She states pain began spontaneously 2 weeks ago and is worsened by moving and bending.  She does not find relief with Robaxin or Ibuprofen.  Patient denies fever, chills, fatigue, cough, nausea, vomiting, shortness of breath, chest pain, dysuria, and hematuria.  Since the ED, she has been avoiding calcium intake.  Patient is scheduled to see a nephrologist September 20th.       Review of Systems   Constitutional: Positive for activity change. Negative for chills, fatigue and fever.   HENT: Negative for nasal congestion, postnasal drip, rhinorrhea, sinus pressure/congestion and sore throat.    Eyes: Negative.    Respiratory: Negative for cough, shortness of breath and wheezing.    Cardiovascular: Negative for chest pain and palpitations.   Gastrointestinal: Negative for abdominal pain, constipation, diarrhea, nausea and vomiting.   Genitourinary: Negative for difficulty urinating, dysuria, frequency, hematuria and urgency.   Musculoskeletal: Positive for back pain (left mid-back ). Negative for arthralgias, leg pain, myalgias and neck pain.   Neurological: Negative for dizziness, seizures, weakness, numbness and headaches.   Psychiatric/Behavioral: Negative.            Past Medical History:   Diagnosis Date    Renal disorder       kidney stone            Past Surgical History:   Procedure Laterality  Date     SECTION, CLASSIC        WRIST ARTHROSCOPY W/ TRIANGULAR FIBROCARTILAGE REPAIR                Review of patient's allergies indicates:   Allergen Reactions    Keflex [cephalexin]         rash    Latex, natural rubber Hives             Current Outpatient Medications on File Prior to Visit   Medication Sig Dispense Refill    [DISCONTINUED] emtricitabine-tenofovir 200-300 mg (TRUVADA) 200-300 mg Tab Take 1 tablet by mouth once daily. (Patient not taking: Reported on 2022) 28 tablet 0    [DISCONTINUED] ibuprofen (ADVIL,MOTRIN) 800 MG tablet Take 1 tablet (800 mg total) by mouth every 6 (six) hours as needed for Pain. (Patient not taking: Reported on 2022) 20 tablet 0    [DISCONTINUED] levonorgestreL (PLAN B ONE-STEP) 1.5 mg Tab tablet Take 1 tablet (1.5 mg total) by mouth once daily. Take 1 dose now and another dose in 24 hours for 2 days (Patient not taking: Reported on 2022) 2 tablet 0    [DISCONTINUED] mupirocin (BACTROBAN) 2 % ointment Apply topically 3 (three) times daily.        [DISCONTINUED] ondansetron (ZOFRAN-ODT) 4 MG TbDL Take 1 tablet (4 mg total) by mouth every 8 (eight) hours as needed (nausea). (Patient not taking: Reported on 2022) 12 tablet 0    [DISCONTINUED] raltegravir (ISENTRESS) 400 mg tablet Take 1 tablet (400 mg total) by mouth 2 (two) times daily. (Patient not taking: Reported on 2022) 56 tablet 0    [DISCONTINUED] sulfamethoxazole/trimethoprim (BACTRIM ORAL) Take by mouth.          No current facility-administered medications on file prior to visit.      Social History               Socioeconomic History    Marital status: Legally    Tobacco Use    Smoking status: Never Smoker    Smokeless tobacco: Never Used   Substance and Sexual Activity    Alcohol use: Yes       Comment: occasionally    Drug use: No    Sexual activity: Yes       Partners: Male               Family History   Problem Relation Age of Onset    Hyperlipidemia  Father      Hypertension Father      Diabetes Father      Cancer Maternal Grandmother      Heart disease Maternal Grandmother      Leukemia Maternal Grandmother            Objective:   Physical Exam  Constitutional:       General: She is not in acute distress.     Appearance: Normal appearance. She is obese. She is ill-appearing (uncomfortable when sitting).   HENT:      Head: Normocephalic and atraumatic.      Right Ear: Tympanic membrane, ear canal and external ear normal.      Left Ear: Tympanic membrane, ear canal and external ear normal.      Nose: Nose normal. No congestion or rhinorrhea.      Mouth/Throat:      Mouth: Mucous membranes are moist.      Pharynx: Oropharynx is clear. No oropharyngeal exudate or posterior oropharyngeal erythema.   Eyes:      Extraocular Movements: Extraocular movements intact.      Conjunctiva/sclera: Conjunctivae normal.      Pupils: Pupils are equal, round, and reactive to light.   Cardiovascular:      Rate and Rhythm: Normal rate and regular rhythm.      Pulses: Normal pulses.      Heart sounds: No murmur heard.    No friction rub. No gallop.   Pulmonary:      Effort: Pulmonary effort is normal. No respiratory distress.      Breath sounds: Normal breath sounds. No wheezing, rhonchi or rales.   Abdominal:      General: Abdomen is flat. Bowel sounds are normal.      Palpations: Abdomen is soft.      Tenderness: There is no abdominal tenderness. There is no guarding.   Musculoskeletal:         General: No tenderness. Normal range of motion.      Cervical back: Normal range of motion and neck supple. No tenderness.      Right lower leg: No edema.      Left lower leg: No edema.   Lymphadenopathy:      Cervical: No cervical adenopathy.   Skin:     General: Skin is warm and dry.      Capillary Refill: Capillary refill takes less than 2 seconds.   Neurological:      General: No focal deficit present.      Mental Status: She is alert and oriented to person, place, and time.       "Motor: No weakness.      Comments: Extremely tender left back pain along lumbar spine radiating to the left flank   Psychiatric:         Mood and Affect: Mood normal.         Behavior: Behavior normal.         Thought Content: Thought content normal.         Judgment: Judgment normal.          Vitals       Vitals:     07/27/22 1019   BP: 122/82   Pulse: 88   Temp: 97.7 °F (36.5 °C)   TempSrc: Oral   SpO2: 97%   Weight: 99.8 kg (220 lb 0.3 oz)   Height: 5' 4" (1.626 m)         Assessment:       Problem List Items Addressed This Visit    None              Visit Diagnoses      Dorsalgia, unspecified         Relevant Orders     X-Ray Lumbar Spine 2 Or 3 Views           Plan:       Dorsalgia              - X-Ray Lumbar Spine               - Prednisone 10 mg 3 tablets x3 days and taper down              - Cyclobenzaprine 10 mg nightly      Lumbar X-ray completed in clinic and will call with results.  Patient advised to keep Nephrology appointment in September and to follow-up in clinic in 1 week.  If no improvement, will recommend physical therapy.             "

## 2022-08-01 ENCOUNTER — PATIENT MESSAGE (OUTPATIENT)
Dept: ADMINISTRATIVE | Facility: OTHER | Age: 32
End: 2022-08-01
Payer: MEDICAID

## 2022-09-06 ENCOUNTER — TELEPHONE (OUTPATIENT)
Dept: FAMILY MEDICINE | Facility: CLINIC | Age: 32
End: 2022-09-06
Payer: MEDICAID

## 2022-09-06 DIAGNOSIS — M79.9 DISORDER OF SOFT TISSUE: Primary | ICD-10-CM

## 2022-09-06 NOTE — TELEPHONE ENCOUNTER
----- Message from Nevin Mcnally sent at 9/6/2022 11:28 AM CDT -----  Type:  Sooner Apoointment Request    Caller is requesting a sooner appointment.  Caller declined first available appointment listed below.  Caller will not accept being placed on the waitlist and is requesting a message be sent to doctor.  Name of Caller:Patricia James     When is the first available appointment?no available appointments     Symptoms:Pt states that she had a visit with  her skin Dr and Dr would like her to f/u with her PCP for  hernia    Would the patient rather a call back or a response via [facility]? Call back     Best Call Back Number:[phone] (mobile)     Additional Information:

## 2022-09-16 ENCOUNTER — OFFICE VISIT (OUTPATIENT)
Dept: FAMILY MEDICINE | Facility: CLINIC | Age: 32
End: 2022-09-16
Payer: MEDICAID

## 2022-09-16 VITALS
SYSTOLIC BLOOD PRESSURE: 130 MMHG | DIASTOLIC BLOOD PRESSURE: 86 MMHG | OXYGEN SATURATION: 96 % | HEART RATE: 84 BPM | WEIGHT: 222.31 LBS | HEIGHT: 64 IN | BODY MASS INDEX: 37.95 KG/M2

## 2022-09-16 DIAGNOSIS — K43.9 ABDOMINAL WALL HERNIA: ICD-10-CM

## 2022-09-16 DIAGNOSIS — R10.13 EPIGASTRIC PAIN: Primary | ICD-10-CM

## 2022-09-16 DIAGNOSIS — M62.08 DIASTASIS RECTI: ICD-10-CM

## 2022-09-16 PROCEDURE — 3079F PR MOST RECENT DIASTOLIC BLOOD PRESSURE 80-89 MM HG: ICD-10-PCS | Mod: CPTII,S$GLB,, | Performed by: PHYSICIAN ASSISTANT

## 2022-09-16 PROCEDURE — 1159F MED LIST DOCD IN RCRD: CPT | Mod: CPTII,S$GLB,, | Performed by: PHYSICIAN ASSISTANT

## 2022-09-16 PROCEDURE — 3075F PR MOST RECENT SYSTOLIC BLOOD PRESS GE 130-139MM HG: ICD-10-PCS | Mod: CPTII,S$GLB,, | Performed by: PHYSICIAN ASSISTANT

## 2022-09-16 PROCEDURE — 99214 OFFICE O/P EST MOD 30 MIN: CPT | Mod: S$GLB,,, | Performed by: PHYSICIAN ASSISTANT

## 2022-09-16 PROCEDURE — 99214 PR OFFICE/OUTPT VISIT, EST, LEVL IV, 30-39 MIN: ICD-10-PCS | Mod: S$GLB,,, | Performed by: PHYSICIAN ASSISTANT

## 2022-09-16 PROCEDURE — 3008F BODY MASS INDEX DOCD: CPT | Mod: CPTII,S$GLB,, | Performed by: PHYSICIAN ASSISTANT

## 2022-09-16 PROCEDURE — 3008F PR BODY MASS INDEX (BMI) DOCUMENTED: ICD-10-PCS | Mod: CPTII,S$GLB,, | Performed by: PHYSICIAN ASSISTANT

## 2022-09-16 PROCEDURE — 3079F DIAST BP 80-89 MM HG: CPT | Mod: CPTII,S$GLB,, | Performed by: PHYSICIAN ASSISTANT

## 2022-09-16 PROCEDURE — 1159F PR MEDICATION LIST DOCUMENTED IN MEDICAL RECORD: ICD-10-PCS | Mod: CPTII,S$GLB,, | Performed by: PHYSICIAN ASSISTANT

## 2022-09-16 PROCEDURE — 3075F SYST BP GE 130 - 139MM HG: CPT | Mod: CPTII,S$GLB,, | Performed by: PHYSICIAN ASSISTANT

## 2022-09-16 RX ORDER — DROSPIRENONE AND ETHINYL ESTRADIOL 0.02-3(28)
1 KIT ORAL DAILY
COMMUNITY
Start: 2022-09-12 | End: 2022-10-19

## 2022-09-16 NOTE — PROGRESS NOTES
Subjective:       Patient ID: Patricia James is a 32 y.o. female.    Chief Complaint: Mass (Lump in mid abdomen for a few years. Painful to lay on. Dermatologist wants to rule out hernia/Very tired today, son has been sick)    Patient is a 31 yo female who comes in at the request of her dermatologist. She has concerns of a mass like sensation in the abdomen. She is worried it could be cancer.     Mass  This is a recurrent problem. The current episode started more than 1 year ago. The problem occurs constantly. The problem has been waxing and waning. Associated symptoms include abdominal pain. Pertinent negatives include no chest pain, chills, fatigue, fever or nausea. The symptoms are aggravated by bending, twisting and exertion (laying of the area). She has tried nothing for the symptoms. The treatment provided no relief.   Review of Systems   Constitutional:  Negative for chills, fatigue and fever.   Respiratory:  Negative for chest tightness and shortness of breath.    Cardiovascular:  Negative for chest pain.   Gastrointestinal:  Positive for abdominal pain. Negative for blood in stool, diarrhea, nausea and reflux.       Past Medical History:   Diagnosis Date    Renal disorder     kidney stone       Objective:      Physical Exam  Constitutional:       General: She is not in acute distress.     Appearance: Normal appearance. She is not ill-appearing, toxic-appearing or diaphoretic.   Cardiovascular:      Rate and Rhythm: Normal rate and regular rhythm.      Pulses: Normal pulses.      Heart sounds: Normal heart sounds. No murmur heard.    No friction rub. No gallop.   Pulmonary:      Effort: Pulmonary effort is normal. No respiratory distress.      Breath sounds: Normal breath sounds. No stridor. No wheezing, rhonchi or rales.   Chest:      Chest wall: No tenderness.   Abdominal:      General: Abdomen is protuberant. Bowel sounds are normal. There is distension. There is no abdominal bruit. There are no  signs of injury.      Palpations: Abdomen is soft. There is mass. There is no shifting dullness, fluid wave, hepatomegaly, splenomegaly or pulsatile mass.      Tenderness: There is abdominal tenderness in the epigastric area.      Hernia: A hernia is present. Hernia is present in the ventral area.       Musculoskeletal:         General: Normal range of motion.   Lymphadenopathy:      Cervical: No cervical adenopathy.   Skin:     General: Skin is warm.   Neurological:      General: No focal deficit present.      Mental Status: She is alert.   Psychiatric:         Mood and Affect: Mood normal.       Assessment:       Problem List Items Addressed This Visit    None  Visit Diagnoses       Epigastric pain    -  Primary    Relevant Orders    CT Abdomen Pelvis W Wo Contrast    Abdominal wall hernia        Relevant Orders    Ambulatory referral/consult to General Surgery    Diastasis recti                  Plan:       Epigastric pain  -     CT Abdomen Pelvis W Wo Contrast; Future; Expected date: 09/16/2022    Abdominal wall hernia  -     Ambulatory referral/consult to General Surgery; Future; Expected date: 09/23/2022    Diastasis recti         I spent 30 minutes on this encounter, time includes face-to-face, chart review, documentation, test review and orders.

## 2022-09-19 ENCOUNTER — HOSPITAL ENCOUNTER (OUTPATIENT)
Dept: RADIOLOGY | Facility: HOSPITAL | Age: 32
Discharge: HOME | End: 2022-09-19
Attending: PHYSICIAN ASSISTANT
Payer: MEDICAID

## 2022-09-19 DIAGNOSIS — M79.9 DISORDER OF SOFT TISSUE: ICD-10-CM

## 2022-09-19 PROCEDURE — 76882 US LMTD JT/FCL EVL NVASC XTR: CPT | Mod: TC,PO,LT

## 2022-09-20 ENCOUNTER — TELEPHONE (OUTPATIENT)
Dept: FAMILY MEDICINE | Facility: CLINIC | Age: 32
End: 2022-09-20
Payer: MEDICAID

## 2022-09-20 ENCOUNTER — PATIENT MESSAGE (OUTPATIENT)
Dept: FAMILY MEDICINE | Facility: CLINIC | Age: 32
End: 2022-09-20
Payer: MEDICAID

## 2022-09-20 DIAGNOSIS — R10.9 ABDOMINAL PAIN, UNSPECIFIED ABDOMINAL LOCATION: Primary | ICD-10-CM

## 2022-09-20 NOTE — TELEPHONE ENCOUNTER
Good morning,   This patient's CT scan was denied. Please let us know if we can appeal. The provider feels it is necessary. She also had an ultrasound where a CT was recommended. Furthermore,  CT was recommended by dermatology.     Thank you.     referral ID [id number].

## 2022-09-21 ENCOUNTER — HOSPITAL ENCOUNTER (OUTPATIENT)
Dept: RADIOLOGY | Facility: HOSPITAL | Age: 32
Discharge: HOME | End: 2022-09-21
Attending: PHYSICIAN ASSISTANT
Payer: MEDICAID

## 2022-09-21 DIAGNOSIS — R10.9 ABDOMINAL PAIN, UNSPECIFIED ABDOMINAL LOCATION: ICD-10-CM

## 2022-09-21 PROCEDURE — A9698 NON-RAD CONTRAST MATERIALNOC: HCPCS

## 2022-09-21 PROCEDURE — 74160 CT ABDOMEN WITH CONTRAST: ICD-10-PCS | Mod: 26,,, | Performed by: RADIOLOGY

## 2022-09-21 PROCEDURE — 25500020 PHARM REV CODE 255

## 2022-09-21 PROCEDURE — 74160 CT ABDOMEN W/CONTRAST: CPT | Mod: 26,,, | Performed by: RADIOLOGY

## 2022-09-21 PROCEDURE — 74160 CT ABDOMEN W/CONTRAST: CPT | Mod: TC

## 2022-09-21 RX ADMIN — IOHEXOL 100 ML: 350 INJECTION, SOLUTION INTRAVENOUS at 12:09

## 2022-09-21 RX ADMIN — IOHEXOL 500 ML: 12 SOLUTION ORAL at 12:09

## 2022-09-22 ENCOUNTER — PATIENT MESSAGE (OUTPATIENT)
Dept: FAMILY MEDICINE | Facility: CLINIC | Age: 32
End: 2022-09-22
Payer: MEDICAID

## 2022-09-22 NOTE — TELEPHONE ENCOUNTER
The ultrasound shows a cyst in the left labial area. These can be fluid filled areas that may need to be drained if become infected or painful. Surgery or GYN usually do these. Not very consistent with a spreading cancer.

## 2022-09-22 NOTE — TELEPHONE ENCOUNTER
Patient is asking for your opinion on the US done on 9/19/22. She states the dermatologist didn't explain anything and referred her to general surgery.

## 2022-09-28 ENCOUNTER — OFFICE VISIT (OUTPATIENT)
Dept: SURGERY | Facility: CLINIC | Age: 32
End: 2022-09-28
Payer: MEDICAID

## 2022-09-28 VITALS — TEMPERATURE: 98 F | DIASTOLIC BLOOD PRESSURE: 98 MMHG | HEART RATE: 91 BPM | SYSTOLIC BLOOD PRESSURE: 138 MMHG

## 2022-09-28 DIAGNOSIS — M62.08 DIASTASIS RECTI: ICD-10-CM

## 2022-09-28 DIAGNOSIS — N90.7 LABIAL CYST: Primary | ICD-10-CM

## 2022-09-28 PROCEDURE — 3080F DIAST BP >= 90 MM HG: CPT | Mod: CPTII,S$GLB,, | Performed by: SURGERY

## 2022-09-28 PROCEDURE — 10021 FNA BX W/O IMG GDN 1ST LES: CPT | Mod: S$GLB,,, | Performed by: SURGERY

## 2022-09-28 PROCEDURE — 10021 PR FINE NEEDLE ASP BIOPSY, W/O IMAGING GUIDANCE, 1ST LESION: ICD-10-PCS | Mod: S$GLB,,, | Performed by: SURGERY

## 2022-09-28 PROCEDURE — 1159F MED LIST DOCD IN RCRD: CPT | Mod: CPTII,S$GLB,, | Performed by: SURGERY

## 2022-09-28 PROCEDURE — 99204 OFFICE O/P NEW MOD 45 MIN: CPT | Mod: S$GLB,,, | Performed by: SURGERY

## 2022-09-28 PROCEDURE — 1159F PR MEDICATION LIST DOCUMENTED IN MEDICAL RECORD: ICD-10-PCS | Mod: CPTII,S$GLB,, | Performed by: SURGERY

## 2022-09-28 PROCEDURE — 99204 PR OFFICE/OUTPT VISIT, NEW, LEVL IV, 45-59 MIN: ICD-10-PCS | Mod: S$GLB,,, | Performed by: SURGERY

## 2022-09-28 PROCEDURE — 3080F PR MOST RECENT DIASTOLIC BLOOD PRESSURE >= 90 MM HG: ICD-10-PCS | Mod: CPTII,S$GLB,, | Performed by: SURGERY

## 2022-09-28 PROCEDURE — 3075F SYST BP GE 130 - 139MM HG: CPT | Mod: CPTII,S$GLB,, | Performed by: SURGERY

## 2022-09-28 PROCEDURE — 3075F PR MOST RECENT SYSTOLIC BLOOD PRESS GE 130-139MM HG: ICD-10-PCS | Mod: CPTII,S$GLB,, | Performed by: SURGERY

## 2022-10-04 ENCOUNTER — PATIENT MESSAGE (OUTPATIENT)
Dept: SURGERY | Facility: CLINIC | Age: 32
End: 2022-10-04
Payer: MEDICAID

## 2022-10-05 ENCOUNTER — TELEPHONE (OUTPATIENT)
Dept: OBSTETRICS AND GYNECOLOGY | Facility: CLINIC | Age: 32
End: 2022-10-05
Payer: MEDICAID

## 2022-10-05 NOTE — H&P
GENERAL SURGERY  OUTPATIENT H&P    REASON FOR VISIT/CC: Abdominal wall hernia, labial cyst    HPI: Patricia James is a 32 y.o. female here for evaluation initially of bulge of the abdominal wall specially with standing and sitting upright. Symptoms have been present for some time were more noticeable after pregnancies. Has had previous  but no upper abdominal surgeries. No hard bulge at rest or when lying flat. No obstructive symptoms.    Also complaining of a bulge along her left labia which was imaged showing a well-marginated mass concerning for our thoroughly and duct were epidermal cyst. Some minor discomfort but also has a large bulge along the labia. No spontaneous drainage.    I have reviewed the patient's chart including prior progress notes, procedures and testing.     ROS:   Review of Systems   All other systems reviewed and are negative.    PROBLEM LIST:  There is no problem list on file for this patient.        HISTORY  Past Medical History:   Diagnosis Date    Renal disorder     kidney stone       Past Surgical History:   Procedure Laterality Date     SECTION, CLASSIC      WRIST ARTHROSCOPY W/ TRIANGULAR FIBROCARTILAGE REPAIR         Social History     Tobacco Use    Smoking status: Never    Smokeless tobacco: Never   Substance Use Topics    Alcohol use: Yes     Comment: occasionally    Drug use: No       Family History   Problem Relation Age of Onset    Hyperlipidemia Father     Hypertension Father     Diabetes Father     Cancer Maternal Grandmother     Heart disease Maternal Grandmother     Leukemia Maternal Grandmother          MEDS:  Current Outpatient Medications on File Prior to Visit   Medication Sig Dispense Refill    predniSONE (DELTASONE) 10 MG tablet Take 3 daily for 3 days, then 2 daily for three days, then 1 daily for three days. (Patient not taking: No sig reported) 18 tablet 0    PANCHO, 28, 3-0.02 mg per tablet Take 1 tablet by mouth once daily.       No current  facility-administered medications on file prior to visit.       ALLERGIES:  Review of patient's allergies indicates:   Allergen Reactions    Keflex [cephalexin]      rash    Latex, natural rubber Hives         VITALS:  Vitals:    09/28/22 1423   BP: (!) 138/98   Pulse: 91   Temp: 98.2 °F (36.8 °C)         PHYSICAL EXAM:  Physical Exam  Vitals reviewed.   Constitutional:       General: She is not in acute distress.     Appearance: Normal appearance. She is well-developed. She is obese.   HENT:      Head: Normocephalic and atraumatic.      Nose: Nose normal.   Eyes:      General: No scleral icterus.     Conjunctiva/sclera: Conjunctivae normal.   Neck:      Trachea: No tracheal tenderness or tracheal deviation.   Cardiovascular:      Rate and Rhythm: Normal rate and regular rhythm.      Pulses: Normal pulses.   Pulmonary:      Effort: Pulmonary effort is normal. No accessory muscle usage or respiratory distress.      Breath sounds: Normal breath sounds.   Abdominal:      General: There is no distension.      Palpations: Abdomen is soft.      Tenderness: There is no abdominal tenderness.          Comments: Upper abdominal bulge consistent with diastasis recti, no palpable fascial defect   Genitourinary:         Comments: Left labia appears full when compared to the right, there is a palpable lesion in the deeper tissue, no overlying fluctuance or erythema  Musculoskeletal:         General: No swelling or tenderness. Normal range of motion.      Cervical back: Normal range of motion and neck supple. No rigidity.   Skin:     General: Skin is warm and dry.      Coloration: Skin is not jaundiced.      Findings: No erythema.   Neurological:      General: No focal deficit present.      Mental Status: She is alert and oriented to person, place, and time.      Motor: No weakness or abnormal muscle tone.   Psychiatric:         Mood and Affect: Mood normal.         Behavior: Behavior normal.         Thought Content: Thought  content normal.         Judgment: Judgment normal.         LABS:  Lab Results   Component Value Date    WBC 7.90 07/19/2022    RBC 5.15 07/19/2022    HGB 14.0 07/19/2022    HCT 42.3 07/19/2022     07/19/2022     Lab Results   Component Value Date     (H) 07/19/2022     (L) 07/19/2022    K 4.0 07/19/2022    CL 98 07/19/2022    CO2 27 07/19/2022    BUN 11 07/19/2022    CREATININE 0.6 07/19/2022    CALCIUM 9.3 07/19/2022     Lab Results   Component Value Date    ALT 34 07/19/2022    AST 18 07/19/2022    ALKPHOS 62 07/19/2022    BILITOT 0.9 07/19/2022     No results found for: MG, PHOS    STUDIES:  Images and reports were personally reviewed.  CT abdomen pelvis  FINDINGS:  The liver is of normal size contour and CT density without focal mass.  The gallbladder small without CT evidence of stone.  The pancreas is of normal contour and CT density without edema or mass.  The spleen is enlarged measuring 16.8 cm front to back but is of normal CT density without focal defect.     The adrenal glands are not enlarged.  The kidneys are of normal size contour and contrast enhancement.  There is a 1.2 cm cyst on the right.  Stone or hydronephrosis is not seen on either side.  The abdominal aorta and inferior vena cava are of normal caliber.     The stomach is of normal configuration.  Small bowel dilatation or air-fluid levels are not seen.  The colon is of normal configuration without dilation or mass.  A normal appendix is noted retrocecal free fluid or free air is not identified.     Impression:     1.2 cm right renal cyst.  Stone or hydronephrosis is not seen.  Splenomegaly.  The rest of the CT abdomen and pelvis is negative.    Soft tissue ultrasound     FINDINGS: Sonographic assessment targeted to the vulva was performed.     Corresponding with the reported clinical history, there is an oval shaped well marginated mass measuring 5.4 x 1.2 x 5.0 cm at the labia on the left. The mass is homogeneously  hypoechoic and probably complex cystic. Primary differential possibilities would include complex Bartholin's duct cyst or epidermal cyst. Abscess is not excluded and should be excluded clinically. Lipoma is possible but felt less likely. CT could be utilized for further differentiation if warranted clinically.     IMPRESSION:  1. Well marginated left-sided labial mass with primary differential considerations as noted above. Please see above discussion and recommendations    ASSESSMENT & PLAN:  32 y.o. female with abdominal wall bulge, left labial mass    Abdominal wall bulge  -physical exam and review of imaging shows no fascial defect  -bulge is due to diastasis recti  -discussed with the patient etiology  -any surgical correction would be considered cosmetic  -recommend weight loss and core strengthening exercises    Left labial mass  -possible Bartholin gland cyst or less likely epidermal inclusion cyst  -I did attempt needle aspiration which was unsuccessful  -will refer to gynecology to see if they have any other recommendations  -could consider excision in the OR but would like to have gyn recommendations 1st    10/05/2022    Patricia James  [id number]    PROCEDURE PERFORMED: Attempted needle aspiration, left labial cyst    PERFORMING SURGEON: Aryan Blackburn Jr    CONSENT:  The risks benefits and alternatives of the procedure were discussed with the patient. The patient was queried for questions and all concerns were addressed.  The patient provided written consent for the procedure.    ANESTHESIA: Lidocaine 2%    INDICATION: Left labial cyst    FINDINGS: Unable to obtain any significant drainage    PROCEDURE IN DETAIL: Verbal consent obtained.  Left labia prepped with Betadine.  Local anesthetic was injected.  An 18 gauge needle was inserted into the left labia and directed towards the palpable mass in the deep tissue. This mass was somewhat mobile and was difficult to get the needle to penetrate. We  were able to but could not get any return of fluid.  After a couple passes that were unsuccessful we elected to conclude the procedure.    EBL: Minimal    COMPLICATIONS: none

## 2022-10-05 NOTE — TELEPHONE ENCOUNTER
Contacted pt to put her on the scheduled for 10/31 at 2 pm. I informed her that she will be put on the wait list if something becomes available sooner than the 31st. Pt voiced understanding.

## 2022-10-05 NOTE — TELEPHONE ENCOUNTER
----- Message from Nevin Mcnally sent at 10/5/2022 10:42 AM CDT -----  Type:  Sooner Apoointment Request    Caller is requesting a sooner appointment.  Caller declined first available appointment listed below.  Caller will not accept being placed on the waitlist and is requesting a message be sent to doctor.  Name of Caller: Patricia James     When is the first available appointment? No available appointments     Symptoms:Labial cyst     Would the patient rather a call back or a response via [facility]?  Call back     Best Call Back Number:[phone] (mobile)     Additional Information:Referring Provider: TYRELL REBOLLEDO JR

## 2022-10-10 ENCOUNTER — PATIENT MESSAGE (OUTPATIENT)
Dept: ADMINISTRATIVE | Facility: HOSPITAL | Age: 32
End: 2022-10-10
Payer: MEDICAID

## 2022-10-12 ENCOUNTER — TELEPHONE (OUTPATIENT)
Dept: SURGERY | Facility: CLINIC | Age: 32
End: 2022-10-12
Payer: MEDICAID

## 2022-10-12 NOTE — TELEPHONE ENCOUNTER
Spoke with patient reviewed Dr Blackburn visit note. Confirmed she was referred to OB/GYN to evaluate the area. understanding verbalized

## 2022-10-17 ENCOUNTER — TELEPHONE (OUTPATIENT)
Dept: FAMILY MEDICINE | Facility: CLINIC | Age: 32
End: 2022-10-17
Payer: MEDICAID

## 2022-10-17 NOTE — TELEPHONE ENCOUNTER
----- Message from Carlos Enrique Crum sent at 10/17/2022  2:16 PM CDT -----  Type: Needs Medical Advice  Who Called:  Patient    Best Call Back Number: [phone]  Additional Information: Patient states that she would like a callback regarding questions and concerns about her care

## 2022-10-17 NOTE — TELEPHONE ENCOUNTER
Patient states she is getting the run around about the spot on her vagina. She states her GYN referred her to dermatology and dermatology referred her to general surgery referred her to a different OB. She is getting the run around and wants your opinion if you think she should go to a different OB or different GYN.

## 2022-10-18 ENCOUNTER — PATIENT MESSAGE (OUTPATIENT)
Dept: FAMILY MEDICINE | Facility: CLINIC | Age: 32
End: 2022-10-18
Payer: MEDICAID

## 2022-10-19 ENCOUNTER — OFFICE VISIT (OUTPATIENT)
Dept: FAMILY MEDICINE | Facility: CLINIC | Age: 32
End: 2022-10-19
Payer: MEDICAID

## 2022-10-19 VITALS
HEIGHT: 64 IN | WEIGHT: 227.94 LBS | BODY MASS INDEX: 38.91 KG/M2 | DIASTOLIC BLOOD PRESSURE: 88 MMHG | SYSTOLIC BLOOD PRESSURE: 118 MMHG | HEART RATE: 64 BPM

## 2022-10-19 DIAGNOSIS — R73.09 ELEVATED GLUCOSE: Primary | ICD-10-CM

## 2022-10-19 LAB
ALBUMIN SERPL BCP-MCNC: 4.6 G/DL (ref 3.5–5.2)
ALP SERPL-CCNC: 66 U/L (ref 55–135)
ALT SERPL W/O P-5'-P-CCNC: 32 U/L (ref 10–44)
ANION GAP SERPL CALC-SCNC: 9 MMOL/L (ref 8–16)
AST SERPL-CCNC: 19 U/L (ref 10–40)
BILIRUB SERPL-MCNC: 0.6 MG/DL (ref 0.1–1)
BUN SERPL-MCNC: 8 MG/DL (ref 6–20)
CALCIUM SERPL-MCNC: 9.8 MG/DL (ref 8.7–10.5)
CHLORIDE SERPL-SCNC: 103 MMOL/L (ref 95–110)
CHOLEST SERPL-MCNC: 155 MG/DL (ref 120–199)
CHOLEST/HDLC SERPL: 4.3 {RATIO} (ref 2–5)
CO2 SERPL-SCNC: 24 MMOL/L (ref 23–29)
CREAT SERPL-MCNC: 0.7 MG/DL (ref 0.5–1.4)
EST. GFR  (NO RACE VARIABLE): >60 ML/MIN/1.73 M^2
ESTIMATED AVG GLUCOSE: 117 MG/DL (ref 68–131)
GLUCOSE SERPL-MCNC: 87 MG/DL (ref 70–110)
HBA1C MFR BLD: 5.7 % (ref 4–5.6)
HDLC SERPL-MCNC: 36 MG/DL (ref 40–75)
HDLC SERPL: 23.2 % (ref 20–50)
LDLC SERPL CALC-MCNC: 89.2 MG/DL (ref 63–159)
NONHDLC SERPL-MCNC: 119 MG/DL
POTASSIUM SERPL-SCNC: 4.1 MMOL/L (ref 3.5–5.1)
PROT SERPL-MCNC: 7.3 G/DL (ref 6–8.4)
SODIUM SERPL-SCNC: 136 MMOL/L (ref 136–145)
TRIGL SERPL-MCNC: 149 MG/DL (ref 30–150)

## 2022-10-19 PROCEDURE — 36415 COLL VENOUS BLD VENIPUNCTURE: CPT | Performed by: PHYSICIAN ASSISTANT

## 2022-10-19 PROCEDURE — 80053 COMPREHEN METABOLIC PANEL: CPT | Performed by: PHYSICIAN ASSISTANT

## 2022-10-19 PROCEDURE — 36415 PR COLLECTION VENOUS BLOOD,VENIPUNCTURE: ICD-10-PCS | Mod: S$GLB,,, | Performed by: INTERNAL MEDICINE

## 2022-10-19 PROCEDURE — 3079F PR MOST RECENT DIASTOLIC BLOOD PRESSURE 80-89 MM HG: ICD-10-PCS | Mod: CPTII,S$GLB,, | Performed by: PHYSICIAN ASSISTANT

## 2022-10-19 PROCEDURE — 80061 LIPID PANEL: CPT | Performed by: PHYSICIAN ASSISTANT

## 2022-10-19 PROCEDURE — 1159F MED LIST DOCD IN RCRD: CPT | Mod: CPTII,S$GLB,, | Performed by: PHYSICIAN ASSISTANT

## 2022-10-19 PROCEDURE — 3074F SYST BP LT 130 MM HG: CPT | Mod: CPTII,S$GLB,, | Performed by: PHYSICIAN ASSISTANT

## 2022-10-19 PROCEDURE — 99214 PR OFFICE/OUTPT VISIT, EST, LEVL IV, 30-39 MIN: ICD-10-PCS | Mod: S$GLB,,, | Performed by: PHYSICIAN ASSISTANT

## 2022-10-19 PROCEDURE — 99214 OFFICE O/P EST MOD 30 MIN: CPT | Mod: S$GLB,,, | Performed by: PHYSICIAN ASSISTANT

## 2022-10-19 PROCEDURE — 3079F DIAST BP 80-89 MM HG: CPT | Mod: CPTII,S$GLB,, | Performed by: PHYSICIAN ASSISTANT

## 2022-10-19 PROCEDURE — 83036 HEMOGLOBIN GLYCOSYLATED A1C: CPT | Performed by: PHYSICIAN ASSISTANT

## 2022-10-19 PROCEDURE — 1159F PR MEDICATION LIST DOCUMENTED IN MEDICAL RECORD: ICD-10-PCS | Mod: CPTII,S$GLB,, | Performed by: PHYSICIAN ASSISTANT

## 2022-10-19 PROCEDURE — 3074F PR MOST RECENT SYSTOLIC BLOOD PRESSURE < 130 MM HG: ICD-10-PCS | Mod: CPTII,S$GLB,, | Performed by: PHYSICIAN ASSISTANT

## 2022-10-19 PROCEDURE — 36415 COLL VENOUS BLD VENIPUNCTURE: CPT | Mod: S$GLB,,, | Performed by: INTERNAL MEDICINE

## 2022-10-19 NOTE — PROGRESS NOTES
Venipuncture performed with 23 gauge butterfly, x's 1 attempt,  to R Antecubital vein.  Specimens collected per orders.      Pressure dressing applied to site, instructed patient to remove dressing in 10-15 minutes, OK to re-adjust dressing if pressure causing any discomfort, to observe closely for numbness and/or discoloration to hand or fingers, and to notify provider if bleeding persists after applying constant pressure lasting 30 minutes.

## 2022-10-19 NOTE — PROGRESS NOTES
"Patricia James  32 y.o. female     Chief Complaint   Patient presents with    Referral     HPI:  33 y/o female presents to the clinic for advise regarding her left labial mass. She has seen OB/GYN, dermatology, and general surgery regarding the mass. Gynecology was not comfortable handing it due to its size. Dermatology was not comfortable handling it due to its location. Surgery attempted to drain it with a needle and was unsuccessful. Surgery referred her to a new OB/GYN for evaluation. She wants to make sure that she is following the correct pathway because she does not know what is in the mass. She denies fever, chills, drainage, or warmth around the mass. She reports erythema and mild tenderness.     Pt would like routine blood work today.       Past Medical History:   Diagnosis Date    Renal disorder     kidney stone       Review of patient's allergies indicates:   Allergen Reactions    Keflex [cephalexin]      rash    Latex, natural rubber Hives          Review of Systems   Constitutional:  Negative for chills and fever.   HENT:  Negative for congestion and sinus pain.    Respiratory:  Negative for cough, sputum production and shortness of breath.    Gastrointestinal:  Negative for constipation, diarrhea, nausea and vomiting.   Genitourinary:  Negative for dysuria, frequency, hematuria and urgency.   Musculoskeletal:  Negative for back pain, joint pain, myalgias and neck pain.   Neurological:  Negative for dizziness and headaches.   Psychiatric/Behavioral:  Negative for depression. The patient is not nervous/anxious.      Vital Signs today:   /88   Pulse 64   Ht 5' 4" (1.626 m)   Wt 103.4 kg (227 lb 15.3 oz)   LMP 09/13/2022 (Approximate)   BMI 39.13 kg/m²      Physical Exam  Vitals reviewed. Exam conducted with a chaperone present.   Constitutional:       General: She is not in acute distress.     Appearance: Normal appearance. She is normal weight. She is not ill-appearing.   HENT:      " Head: Normocephalic and atraumatic.      Right Ear: External ear normal.      Left Ear: External ear normal.      Nose: Nose normal.   Cardiovascular:      Rate and Rhythm: Normal rate.   Pulmonary:      Effort: Pulmonary effort is normal. No respiratory distress.   Genitourinary:     Labia:         Right: No tenderness or lesion.         Left: Tenderness (5 cm long fluctuant mass along left labia) present. No lesion.    Neurological:      General: No focal deficit present.      Mental Status: She is alert and oriented to person, place, and time.   Psychiatric:         Mood and Affect: Mood normal.         Behavior: Behavior normal.         Thought Content: Thought content normal.         Judgment: Judgment normal.            Lab Results   Component Value Date    WBC 7.90 07/19/2022    HGB 14.0 07/19/2022    HCT 42.3 07/19/2022    MCV 82 07/19/2022     07/19/2022     CMP  Sodium   Date Value Ref Range Status   07/19/2022 133 (L) 136 - 145 mmol/L Final     Potassium   Date Value Ref Range Status   07/19/2022 4.0 3.5 - 5.1 mmol/L Final     Chloride   Date Value Ref Range Status   07/19/2022 98 95 - 110 mmol/L Final     CO2   Date Value Ref Range Status   07/19/2022 27 23 - 29 mmol/L Final     Glucose   Date Value Ref Range Status   07/19/2022 120 (H) 70 - 110 mg/dL Final     BUN   Date Value Ref Range Status   07/19/2022 11 6 - 20 mg/dL Final     Creatinine   Date Value Ref Range Status   07/19/2022 0.6 0.5 - 1.4 mg/dL Final     Calcium   Date Value Ref Range Status   07/19/2022 9.3 8.7 - 10.5 mg/dL Final     Total Protein   Date Value Ref Range Status   07/19/2022 7.7 6.0 - 8.4 g/dL Final     Albumin   Date Value Ref Range Status   07/19/2022 4.7 3.5 - 5.2 g/dL Final     Total Bilirubin   Date Value Ref Range Status   07/19/2022 0.9 0.1 - 1.0 mg/dL Final     Comment:     For infants and newborns, interpretation of results should be based  on gestational age, weight and in agreement with  clinical  observations.    Premature Infant recommended reference ranges:  Up to 24 hours.............<8.0 mg/dL  Up to 48 hours............<12.0 mg/dL  3-5 days..................<15.0 mg/dL  6-29 days.................<15.0 mg/dL       Alkaline Phosphatase   Date Value Ref Range Status   07/19/2022 62 55 - 135 U/L Final     AST   Date Value Ref Range Status   07/19/2022 18 10 - 40 U/L Final     ALT   Date Value Ref Range Status   07/19/2022 34 10 - 44 U/L Final     Anion Gap   Date Value Ref Range Status   07/19/2022 8 8 - 16 mmol/L Final     eGFR if    Date Value Ref Range Status   07/19/2022 >60.0 >60 mL/min/1.73 m^2 Final     eGFR if non    Date Value Ref Range Status   07/19/2022 >60.0 >60 mL/min/1.73 m^2 Final     Comment:     Calculation used to obtain the estimated glomerular filtration  rate (eGFR) is the CKD-EPI equation.        No results found for: CHOL  No results found for: HDL  No results found for: LDLCALC  No results found for: TRIG  No results found for: CHOLHDL  No results found for: LABA1C, HGBA1C     ASSESSMENT AND PLAN    Elevated glucose  -     Comprehensive Metabolic Panel; Future; Expected date: 10/19/2022  -     Hemoglobin A1C; Future; Expected date: 10/19/2022  -     Lipid Panel; Future; Expected date: 10/19/2022       I spent 30 minutes on this encounter, time includes face-to-face, chart review, documentation, test review and orders.    CORNELIUS Almanzar-S2  [facility]   Physician Assistant Student     Layo Reis III, PA-C

## 2022-10-20 DIAGNOSIS — R73.03 PREDIABETES: Primary | ICD-10-CM

## 2022-10-20 RX ORDER — METFORMIN HYDROCHLORIDE 500 MG/1
500 TABLET, EXTENDED RELEASE ORAL
Qty: 90 TABLET | Refills: 3 | Status: SHIPPED | OUTPATIENT
Start: 2022-10-20 | End: 2022-11-30 | Stop reason: SDUPTHER

## 2022-10-31 ENCOUNTER — OFFICE VISIT (OUTPATIENT)
Dept: OBSTETRICS AND GYNECOLOGY | Facility: CLINIC | Age: 32
End: 2022-10-31
Payer: MEDICAID

## 2022-10-31 VITALS
HEIGHT: 64 IN | WEIGHT: 226.94 LBS | DIASTOLIC BLOOD PRESSURE: 86 MMHG | BODY MASS INDEX: 38.74 KG/M2 | SYSTOLIC BLOOD PRESSURE: 128 MMHG

## 2022-10-31 DIAGNOSIS — N90.89 VULVAR MASS: Primary | ICD-10-CM

## 2022-10-31 DIAGNOSIS — R73.03 PREDIABETES: ICD-10-CM

## 2022-10-31 PROCEDURE — 99999 PR PBB SHADOW E&M-EST. PATIENT-LVL III: CPT | Mod: PBBFAC,,, | Performed by: OBSTETRICS & GYNECOLOGY

## 2022-10-31 PROCEDURE — 3044F PR MOST RECENT HEMOGLOBIN A1C LEVEL <7.0%: ICD-10-PCS | Mod: CPTII,,, | Performed by: OBSTETRICS & GYNECOLOGY

## 2022-10-31 PROCEDURE — 99213 OFFICE O/P EST LOW 20 MIN: CPT | Mod: PBBFAC,PO | Performed by: OBSTETRICS & GYNECOLOGY

## 2022-10-31 PROCEDURE — 99999 PR PBB SHADOW E&M-EST. PATIENT-LVL III: ICD-10-PCS | Mod: PBBFAC,,, | Performed by: OBSTETRICS & GYNECOLOGY

## 2022-10-31 PROCEDURE — 99204 OFFICE O/P NEW MOD 45 MIN: CPT | Mod: S$PBB,,, | Performed by: OBSTETRICS & GYNECOLOGY

## 2022-10-31 PROCEDURE — 99204 PR OFFICE/OUTPT VISIT, NEW, LEVL IV, 45-59 MIN: ICD-10-PCS | Mod: S$PBB,,, | Performed by: OBSTETRICS & GYNECOLOGY

## 2022-10-31 PROCEDURE — 3074F PR MOST RECENT SYSTOLIC BLOOD PRESSURE < 130 MM HG: ICD-10-PCS | Mod: CPTII,,, | Performed by: OBSTETRICS & GYNECOLOGY

## 2022-10-31 PROCEDURE — 3044F HG A1C LEVEL LT 7.0%: CPT | Mod: CPTII,,, | Performed by: OBSTETRICS & GYNECOLOGY

## 2022-10-31 PROCEDURE — 1159F PR MEDICATION LIST DOCUMENTED IN MEDICAL RECORD: ICD-10-PCS | Mod: CPTII,,, | Performed by: OBSTETRICS & GYNECOLOGY

## 2022-10-31 PROCEDURE — 3079F PR MOST RECENT DIASTOLIC BLOOD PRESSURE 80-89 MM HG: ICD-10-PCS | Mod: CPTII,,, | Performed by: OBSTETRICS & GYNECOLOGY

## 2022-10-31 PROCEDURE — 3074F SYST BP LT 130 MM HG: CPT | Mod: CPTII,,, | Performed by: OBSTETRICS & GYNECOLOGY

## 2022-10-31 PROCEDURE — 1159F MED LIST DOCD IN RCRD: CPT | Mod: CPTII,,, | Performed by: OBSTETRICS & GYNECOLOGY

## 2022-10-31 PROCEDURE — 3079F DIAST BP 80-89 MM HG: CPT | Mod: CPTII,,, | Performed by: OBSTETRICS & GYNECOLOGY

## 2022-10-31 RX ORDER — MUPIROCIN 20 MG/G
OINTMENT TOPICAL
Status: CANCELLED | OUTPATIENT
Start: 2022-10-31

## 2022-10-31 RX ORDER — SODIUM CHLORIDE 9 MG/ML
INJECTION, SOLUTION INTRAVENOUS CONTINUOUS
Status: CANCELLED | OUTPATIENT
Start: 2022-10-31

## 2022-10-31 NOTE — PROGRESS NOTES
Subjective:   Chief Complaint:  Abscess (Labial abscess)       Patient ID: Patricia James is a  32 y.o. female.    Contraception:  Vasectomy    Date: 10/31/2022    The patient presents today due to the following:    She has apparently been seen on multiple occasions due to a swelling on the left labia.  She was initially seen by gynecology and referred to general surgery.  An attempted drainage of the area was performed by General surgery but no fluid was removed and she was subsequent referred for further gynecologic evaluation.    She reports that the swelling was not painful until drainage was attempted.    From a gyn standpoint she is currently doing well without complaints.    GYN & OB History  Patient's last menstrual period was 10/30/2022 (exact date).     Date of Last Pap: No result found    OB History    Para Term  AB Living   2 2 2     2   SAB IAB Ectopic Multiple Live Births                  # Outcome Date GA Lbr Vince/2nd Weight Sex Delivery Anes PTL Lv   2 Term            1 Term               Obstetric Comments    x 2         Past Medical History:   Diagnosis Date    Renal disorder     kidney stone        Past Surgical History:   Procedure Laterality Date     SECTION      x 2    WRIST ARTHROSCOPY W/ TRIANGULAR FIBROCARTILAGE REPAIR          Review of patient's allergies indicates:   Allergen Reactions    Keflex [cephalexin]      rash    Latex, natural rubber Hives        Medications    Current Outpatient Medications:     metFORMIN (GLUCOPHAGE-XR) 500 MG ER 24hr tablet, Take 1 tablet (500 mg total) by mouth daily with breakfast. (Patient not taking: Reported on 10/31/2022), Disp: 90 tablet, Rfl: 3       Review of Systems (at today's evaluation)  Review of Systems   Constitutional:  Negative for fever and unexpected weight change.   HENT: Negative.     Respiratory:  Negative for cough and shortness of breath.    Cardiovascular:  Negative for chest pain.    Gastrointestinal:  Negative for abdominal pain, nausea and vomiting.   Genitourinary:  Negative for dysuria and urgency.          Gyn as per HPI   Musculoskeletal:  Negative for myalgias.   Integumentary:  Negative for rash.   Neurological: Negative.  Negative for headaches.   Breast: negative.       Objective:      Vitals:    10/31/22 1400   BP: 128/86     Physical Exam:   Constitutional: She appears well-developed and well-nourished.    HENT:   Head: Normocephalic.     Neck: No thyroid mass present.    Cardiovascular:  Normal rate, regular rhythm and normal heart sounds.             Pulmonary/Chest: Effort normal and breath sounds normal.        Abdominal: Soft. There is no abdominal tenderness.     Genitourinary:    Inguinal canal, vagina, uterus, right adnexa and left adnexa normal.            Pelvic exam was performed with patient supine.   The external female genitalia was normal.   No external genitalia lesions identified,Cervix is normal. Right adnexum displays no mass and no tenderness. Left adnexum displays no mass and no tenderness. No tenderness or bleeding in the vagina. Uterus is not tender. Normal urethral meatus.Urethra findings: no tendernessBladder findings: no bladder tenderness          Musculoskeletal:      Right lower leg: No edema.      Left lower leg: No edema.      Lymphadenopathy:     She has no cervical adenopathy. No inguinal adenopathy noted on the right or left side.    Neurological: She is alert.    Skin: Skin is warm and dry.    Psychiatric: Mood normal.          9/19/2022      Narrative & Impression  Ultrasound pelvis Limited     CLINICAL DATA: Left-sided labial mass     FINDINGS: Sonographic assessment targeted to the vulva was performed.     Corresponding with the reported clinical history, there is an oval shaped well marginated mass measuring 5.4 x 1.2 x 5.0 cm at the labia on the left. The mass is homogeneously hypoechoic and probably complex cystic. Primary differential  possibilities would include complex Bartholin's duct cyst or epidermal cyst. Abscess is not excluded and should be excluded clinically. Lipoma is possible but felt less likely. CT could be utilized for further differentiation if warranted clinically.     IMPRESSION:  1. Well marginated left-sided labial mass with primary differential considerations as noted above. Please see above discussion and recommendations.     Electronically signed by:  Tevin Burt MD  9/19/2022 5:28 PM CDT Workstation: [id number]     Assessment:        1. Vulvar mass    2. Prediabetes         Plan:      Vulvar mass  -     Place in Outpatient; Standing  -     Full code; Standing  -     Vital signs; Standing  -     Insert peripheral IV; Standing  -     Cline to Gravity; Standing  -     POCT glucose; Standing  -     Notify physician if BS > 180 for hysterectomy patients; Standing  -     Chlorhexidine (CHG) 2% Wipes; Standing  -     Notify Physician/Vital Signs Parameters Urine output less than 0.5mL/kg/hr (with indwelling catheter) or 30 mL/hr (without indwelling catheter) or blood glucose greater than 200 mg/dL; Standing  -     Notify physician; Standing  -     Notify Physician - Potential Need of Opioid Reversal; Standing  -     Diet NPO; Standing  -     Place sequential compression device; Standing  -     Chlorohexidine Gluconate Bath; Standing  -     Pregnancy, urine rapid; Standing  -     Case Request Operating Room: VULVECTOMY, EXCISION OF VULVAR MASS AND OTHER INDICATED PROCEDURES  -     Bed rest with bathroom privileges; Standing    Prediabetes    Other orders  -     0.9%  NaCl infusion  -     IP VTE LOW RISK PATIENT; Standing  -     mupirocin 2 % ointment  -     ceFAZolin (ANCEF) 2 g in dextrose 5 % 50 mL IVPB       Follow up for As needed or 2 weeks following surgery.       The above was reviewed and discussed with the patient.    We discussed her current issues with a left vulvar mass.  We discussed findings on physical exam as  well as recent radiologic studies..    Conservative, medical, and surgical interventions were discussed, and the patient would like to proceed with surgical intervention.  She will be scheduled for a EXCISION OF VULVAR MASS AND OTHER INDICATED PROCEDURES    The pros, cons, risks, benefits, alternatives, and indications of the surgical procedure were discussed in detail with the patient.  We discussed the possibility of allergic reactions, bleeding, infection damage to cervix, uterus, bladder, ureters, bowel, and other abdominal pelvic organs.  Issues unique to this procedure were discussed.    The patient's questions regarding above were answered and she agrees with this plan.  The patient was provided with the informed consent form and given times reviewed the form.  Questions were answered and consent was obtained      The patient's questions were answered, and she is in agreement with the current plan.

## 2022-11-17 ENCOUNTER — ANESTHESIA EVENT (OUTPATIENT)
Dept: SURGERY | Facility: AMBULARY SURGERY CENTER | Age: 32
End: 2022-11-17
Payer: MEDICAID

## 2022-11-18 ENCOUNTER — ANESTHESIA (OUTPATIENT)
Dept: SURGERY | Facility: AMBULARY SURGERY CENTER | Age: 32
End: 2022-11-18
Payer: MEDICAID

## 2022-11-18 ENCOUNTER — HOSPITAL ENCOUNTER (OUTPATIENT)
Facility: AMBULARY SURGERY CENTER | Age: 32
Discharge: HOME | End: 2022-11-18
Attending: OBSTETRICS & GYNECOLOGY | Admitting: OBSTETRICS & GYNECOLOGY
Payer: MEDICAID

## 2022-11-18 DIAGNOSIS — N90.89 VULVAR MASS: ICD-10-CM

## 2022-11-18 LAB
B-HCG UR QL: NEGATIVE
CTP QC/QA: YES

## 2022-11-18 PROCEDURE — D9220A PRA ANESTHESIA: Mod: ANES,,, | Performed by: ANESTHESIOLOGY

## 2022-11-18 PROCEDURE — 88307 PR  SURG PATH,LEVEL V: ICD-10-PCS | Mod: 26,,, | Performed by: PATHOLOGY

## 2022-11-18 PROCEDURE — 57135 EXCISION VAGINAL CYST/TUMOR: CPT | Mod: ,,, | Performed by: OBSTETRICS & GYNECOLOGY

## 2022-11-18 PROCEDURE — 88341 PR IHC OR ICC EACH ADD'L SINGLE ANTIBODY  STAINPR: ICD-10-PCS | Mod: 26,,, | Performed by: PATHOLOGY

## 2022-11-18 PROCEDURE — 88341 IMHCHEM/IMCYTCHM EA ADD ANTB: CPT | Performed by: PATHOLOGY

## 2022-11-18 PROCEDURE — 88342 IMHCHEM/IMCYTCHM 1ST ANTB: CPT | Performed by: PATHOLOGY

## 2022-11-18 PROCEDURE — D9220A PRA ANESTHESIA: ICD-10-PCS | Mod: ANES,,, | Performed by: ANESTHESIOLOGY

## 2022-11-18 PROCEDURE — D9220A PRA ANESTHESIA: ICD-10-PCS | Mod: CRNA,,, | Performed by: NURSE ANESTHETIST, CERTIFIED REGISTERED

## 2022-11-18 PROCEDURE — 88341 IMHCHEM/IMCYTCHM EA ADD ANTB: CPT | Mod: 26,,, | Performed by: PATHOLOGY

## 2022-11-18 PROCEDURE — 57135 PR EXCIS VAGINAL CYST/TUMOR: ICD-10-PCS | Mod: ,,, | Performed by: OBSTETRICS & GYNECOLOGY

## 2022-11-18 PROCEDURE — D9220A PRA ANESTHESIA: Mod: CRNA,,, | Performed by: NURSE ANESTHETIST, CERTIFIED REGISTERED

## 2022-11-18 PROCEDURE — 88307 TISSUE EXAM BY PATHOLOGIST: CPT | Performed by: PATHOLOGY

## 2022-11-18 PROCEDURE — 57135 EXCISION VAGINAL CYST/TUMOR: CPT | Mod: LT | Performed by: OBSTETRICS & GYNECOLOGY

## 2022-11-18 PROCEDURE — 88307 TISSUE EXAM BY PATHOLOGIST: CPT | Mod: 26,,, | Performed by: PATHOLOGY

## 2022-11-18 PROCEDURE — 88342 CHG IMMUNOCYTOCHEMISTRY: ICD-10-PCS | Mod: 26,,, | Performed by: PATHOLOGY

## 2022-11-18 PROCEDURE — 88342 IMHCHEM/IMCYTCHM 1ST ANTB: CPT | Mod: 26,,, | Performed by: PATHOLOGY

## 2022-11-18 RX ORDER — SODIUM CHLORIDE 9 MG/ML
INJECTION, SOLUTION INTRAVENOUS CONTINUOUS
Status: DISCONTINUED | OUTPATIENT
Start: 2022-11-18 | End: 2022-11-18 | Stop reason: HOSPADM

## 2022-11-18 RX ORDER — CEFAZOLIN SODIUM 1 G/3ML
INJECTION, POWDER, FOR SOLUTION INTRAMUSCULAR; INTRAVENOUS
Status: DISCONTINUED
Start: 2022-11-18 | End: 2022-11-18 | Stop reason: HOSPADM

## 2022-11-18 RX ORDER — FENTANYL CITRATE 50 UG/ML
25 INJECTION, SOLUTION INTRAMUSCULAR; INTRAVENOUS EVERY 5 MIN PRN
Status: DISCONTINUED | OUTPATIENT
Start: 2022-11-18 | End: 2022-11-18 | Stop reason: HOSPADM

## 2022-11-18 RX ORDER — IBUPROFEN 600 MG/1
600 TABLET ORAL 3 TIMES DAILY
Qty: 30 TABLET | Refills: 1 | OUTPATIENT
Start: 2022-11-18 | End: 2023-09-24

## 2022-11-18 RX ORDER — MIDAZOLAM HYDROCHLORIDE 1 MG/ML
INJECTION, SOLUTION INTRAMUSCULAR; INTRAVENOUS
Status: DISCONTINUED | OUTPATIENT
Start: 2022-11-18 | End: 2022-11-18

## 2022-11-18 RX ORDER — OXYCODONE AND ACETAMINOPHEN 5; 325 MG/1; MG/1
1 TABLET ORAL EVERY 4 HOURS PRN
Qty: 18 TABLET | Refills: 0 | OUTPATIENT
Start: 2022-11-18 | End: 2022-11-21

## 2022-11-18 RX ORDER — PROPOFOL 10 MG/ML
VIAL (ML) INTRAVENOUS
Status: DISCONTINUED | OUTPATIENT
Start: 2022-11-18 | End: 2022-11-18

## 2022-11-18 RX ORDER — ONDANSETRON 2 MG/ML
INJECTION INTRAMUSCULAR; INTRAVENOUS
Status: DISCONTINUED | OUTPATIENT
Start: 2022-11-18 | End: 2022-11-18

## 2022-11-18 RX ORDER — LIDOCAINE HYDROCHLORIDE 10 MG/ML
1 INJECTION, SOLUTION EPIDURAL; INFILTRATION; INTRACAUDAL; PERINEURAL ONCE
Status: COMPLETED | OUTPATIENT
Start: 2022-11-18 | End: 2022-11-18

## 2022-11-18 RX ORDER — MUPIROCIN 20 MG/G
OINTMENT TOPICAL
Status: DISCONTINUED | OUTPATIENT
Start: 2022-11-18 | End: 2022-11-18 | Stop reason: HOSPADM

## 2022-11-18 RX ORDER — BUPIVACAINE HYDROCHLORIDE 2.5 MG/ML
INJECTION, SOLUTION EPIDURAL; INFILTRATION; INTRACAUDAL
Status: DISCONTINUED | OUTPATIENT
Start: 2022-11-18 | End: 2022-11-18 | Stop reason: HOSPADM

## 2022-11-18 RX ORDER — SODIUM CHLORIDE, SODIUM LACTATE, POTASSIUM CHLORIDE, CALCIUM CHLORIDE 600; 310; 30; 20 MG/100ML; MG/100ML; MG/100ML; MG/100ML
INJECTION, SOLUTION INTRAVENOUS CONTINUOUS
Status: DISCONTINUED | OUTPATIENT
Start: 2022-11-18 | End: 2022-11-18 | Stop reason: HOSPADM

## 2022-11-18 RX ORDER — METOCLOPRAMIDE HYDROCHLORIDE 5 MG/ML
10 INJECTION INTRAMUSCULAR; INTRAVENOUS EVERY 10 MIN PRN
Status: COMPLETED | OUTPATIENT
Start: 2022-11-18 | End: 2022-11-18

## 2022-11-18 RX ORDER — FENTANYL CITRATE 50 UG/ML
INJECTION, SOLUTION INTRAMUSCULAR; INTRAVENOUS
Status: DISCONTINUED | OUTPATIENT
Start: 2022-11-18 | End: 2022-11-18

## 2022-11-18 RX ORDER — OXYCODONE HYDROCHLORIDE 5 MG/1
5 TABLET ORAL
Status: DISCONTINUED | OUTPATIENT
Start: 2022-11-18 | End: 2022-11-18 | Stop reason: HOSPADM

## 2022-11-18 RX ORDER — LIDOCAINE HYDROCHLORIDE 20 MG/ML
INJECTION INTRAVENOUS
Status: DISCONTINUED | OUTPATIENT
Start: 2022-11-18 | End: 2022-11-18

## 2022-11-18 RX ADMIN — FENTANYL CITRATE 50 MCG: 50 INJECTION, SOLUTION INTRAMUSCULAR; INTRAVENOUS at 12:11

## 2022-11-18 RX ADMIN — MIDAZOLAM HYDROCHLORIDE 2 MG: 1 INJECTION, SOLUTION INTRAMUSCULAR; INTRAVENOUS at 12:11

## 2022-11-18 RX ADMIN — SODIUM CHLORIDE, SODIUM LACTATE, POTASSIUM CHLORIDE, CALCIUM CHLORIDE: 600; 310; 30; 20 INJECTION, SOLUTION INTRAVENOUS at 12:11

## 2022-11-18 RX ADMIN — ONDANSETRON 4 MG: 2 INJECTION INTRAMUSCULAR; INTRAVENOUS at 01:11

## 2022-11-18 RX ADMIN — Medication 160 MG: at 12:11

## 2022-11-18 RX ADMIN — OXYCODONE HYDROCHLORIDE 5 MG: 5 TABLET ORAL at 02:11

## 2022-11-18 RX ADMIN — FENTANYL CITRATE 50 MCG: 50 INJECTION, SOLUTION INTRAMUSCULAR; INTRAVENOUS at 01:11

## 2022-11-18 RX ADMIN — LIDOCAINE HYDROCHLORIDE 5 MG: 10 INJECTION, SOLUTION EPIDURAL; INFILTRATION; INTRACAUDAL; PERINEURAL at 12:11

## 2022-11-18 RX ADMIN — LIDOCAINE HYDROCHLORIDE 75 MG: 20 INJECTION INTRAVENOUS at 12:11

## 2022-11-18 RX ADMIN — METOCLOPRAMIDE HYDROCHLORIDE 10 MG: 5 INJECTION INTRAMUSCULAR; INTRAVENOUS at 02:11

## 2022-11-18 NOTE — ANESTHESIA POSTPROCEDURE EVALUATION
Anesthesia Post Evaluation    Patient: Patricia James    Procedure(s) Performed: Procedure(s) (LRB):  VULVECTOMY, EXCISION OF VULVAR MASS AND OTHER INDICATED PROCEDURES (Left)    Final Anesthesia Type: general      Patient location during evaluation: PACU  Patient participation: Yes- Able to Participate  Level of consciousness: awake and alert  Post-procedure vital signs: reviewed and stable  Pain management: adequate  Airway patency: patent    PONV status at discharge: No PONV  Anesthetic complications: no      Cardiovascular status: blood pressure returned to baseline  Respiratory status: unassisted  Hydration status: euvolemic  Follow-up not needed.          Vitals Value Taken Time   /77 11/18/22 1356   Temp 36.7 °C (98.1 °F) 11/18/22 1345   Pulse 105 11/18/22 1400   Resp 17 11/18/22 1355   SpO2 99 % 11/18/22 1400   Vitals shown include unvalidated device data.      No case tracking events are documented in the log.      Pain/David Score: No data recorded

## 2022-11-18 NOTE — TRANSFER OF CARE
"Anesthesia Transfer of Care Note    Patient: Patricia James    Procedure(s) Performed: Procedure(s) (LRB):  VULVECTOMY, EXCISION OF VULVAR MASS AND OTHER INDICATED PROCEDURES (Left)    Patient location: PACU    Anesthesia Type: general    Transport from OR: Transported from OR on room air with adequate spontaneous ventilation    Post pain: adequate analgesia    Post assessment: no apparent anesthetic complications and tolerated procedure well    Post vital signs: stable    Level of consciousness: sedated    Nausea/Vomiting: no nausea/vomiting    Complications: none    Transfer of care protocol was followed      Last vitals:   Visit Vitals  /81 (BP Location: Left arm, Patient Position: Lying)   Pulse 99   Temp 37.2 °C (99 °F) (Skin)   Resp 20   Ht 5' 4" (1.626 m)   Wt 102.5 kg (226 lb)   LMP 10/30/2022 (Exact Date)   SpO2 96%   Breastfeeding No   BMI 38.79 kg/m²     "

## 2022-11-18 NOTE — INTERVAL H&P NOTE
The patient has been examined and the H&P has been reviewed:    I concur with the findings and no changes have occurred since H&P was written.    Surgery risks, benefits and alternative options discussed and understood by patient.    Ultrasound pelvis IMPRESSION:  1. Well marginated left-sided labial mass with primary differential considerations as noted above. Please see above discussion and recommendations.     Assessment:  1. Vulvar mass   2. Prediabetes     Plan: EXCISION OF VULVAR MASS AND OTHER INDICATED PROCEDURES     UPT: Negative    There are no hospital problems to display for this patient.

## 2022-11-18 NOTE — TRANSFER OF CARE
"Anesthesia Transfer of Care Note    Patient: Patricia James    Procedure(s) Performed: Procedure(s) (LRB):  VULVECTOMY, EXCISION OF VULVAR MASS AND OTHER INDICATED PROCEDURES (Left)    Patient location: PACU    Anesthesia Type: general    Transport from OR: Transported from OR on 2-3 L/min O2 by NC with adequate spontaneous ventilation    Post pain: adequate analgesia    Post assessment: no apparent anesthetic complications and tolerated procedure well    Post vital signs: stable    Level of consciousness: awake    Nausea/Vomiting: no nausea/vomiting    Complications: none    Transfer of care protocol was followed      Last vitals:   Visit Vitals  /81 (BP Location: Left arm, Patient Position: Lying)   Pulse 99   Temp 37.2 °C (99 °F) (Skin)   Resp 20   Ht 5' 4" (1.626 m)   Wt 102.5 kg (226 lb)   LMP 10/30/2022 (Exact Date)   SpO2 96%   Breastfeeding No   BMI 38.79 kg/m²     "

## 2022-11-18 NOTE — ANESTHESIA PREPROCEDURE EVALUATION
11/18/2022  Patricia James is a 32 y.o., female.      Pre-op Assessment    I have reviewed the Patient Summary Reports.     I have reviewed the Nursing Notes. I have reviewed the NPO Status.   I have reviewed the Medications.     Review of Systems  Anesthesia Hx:  Denies Family Hx of Anesthesia complications.   Denies Personal Hx of Anesthesia complications.   Renal/:   Chronic Renal Disease    Endocrine:   Diabetes  Obesity / BMI > 30      Physical Exam  General: Cooperative, Alert and Oriented    Airway:  Mallampati: III / II  Mouth Opening: Normal  TM Distance: Normal  Tongue: Normal  Neck ROM: Normal ROM  Neck: Girth Increased        Anesthesia Plan  Type of Anesthesia, risks & benefits discussed:    Anesthesia Type: Gen Supraglottic Airway  Intra-op Monitoring Plan: Standard ASA Monitors  Post Op Pain Control Plan: multimodal analgesia  Induction:  IV  Airway Plan: , Post-Induction  Informed Consent: Informed consent signed with the Patient and all parties understand the risks and agree with anesthesia plan.  All questions answered.   ASA Score: 2    Ready For Surgery From Anesthesia Perspective.     .

## 2022-11-18 NOTE — ANESTHESIA PROCEDURE NOTES
Intubation    Date/Time: 11/18/2022 12:49 PM  Performed by: Efra Dale CRNA  Authorized by: Efra Dale CRNA     Intubation:     Induction:  Intravenous    Intubated:  Postinduction    Mask Ventilation:  Easy mask    Attempts:  1    Attempted By:  CRNA    Difficult Airway Encountered?: No      Complications:  None    Airway Device:  Supraglottic airway/LMA    Airway Device Size:  3.0    Secured at:  The lips    Placement Verified By:  Capnometry    Complicating Factors:  None    Findings Post-Intubation:  Atraumatic/condition of teeth unchanged and BS equal bilateral

## 2022-11-18 NOTE — BRIEF OP NOTE
[facility]  Brief Operative Note    Surgery Date: 11/18/2022     Surgeon(s) and Role:     * Wilbert Arrington MD - Primary    Assisting Surgeon: None    Pre-op Diagnosis:  Vulvar mass [N90.89]    Post-op Diagnosis:  Post-Op Diagnosis Codes:     * Vulvar mass [N90.89]    Procedure(s) (LRB):  VULVECTOMY, EXCISION OF VULVAR MASS AND OTHER INDICATED PROCEDURES (Left)    Anesthesia: Choice    Operative Findings:  Left vulvar mass    Estimated Blood Loss: * No values recorded between 11/18/2022  1:00 PM and 11/18/2022  1:43 PM *  125 cc         Specimens:   Specimen (24h ago, onward)       Start     Ordered    11/18/22 1333  Specimen to Pathology, Surgery Gynecology and Obstetrics  Once        Comments: Pre-op Diagnosis: Vulvar mass [N90.89]Procedure(s):VULVECTOMY, EXCISION OF VULVAR MASS AND OTHER INDICATED PROCEDURES Number of specimens: 1Name of specimens: vulvar mass     References:    Click here for ordering Quick Tip   Question Answer Comment   Procedure Type: Gynecology and Obstetrics    Specimen Class: Routine/Screening    Which provider would you like to cc? WILBERT ARRINGTON    Release to patient Immediate        11/18/22 1333                      Discharge Note    OUTCOME: Patient tolerated treatment/procedure well without complication and is now ready for discharge.    DISPOSITION: Home or Self Care    FINAL DIAGNOSIS:  <principal problem not specified>    FOLLOWUP: In clinic    DISCHARGE INSTRUCTIONS:  No discharge procedures on file.     Clinical Reference Documents Added to Patient Instructions         Document    VULVECTOMY (ENGLISH)

## 2022-11-18 NOTE — OP NOTE
Operative Report:  11/18/2022    SURGEON: Miguel Ángel Arrington M.D., B[place]O    ASSISTANT: None    PREOPERATIVE DIAGNOSIS:    Vulvar mass   Prediabetes    POSTOPERATIVE DIAGNOSIS:   1.  Vulvar mass  2.  Prediabetes    PROCEDURE:   1. Excision of left vulvar mass    ANESTHESIA: General endotracheal  with LMA.    ESTIMATED BLOOD LOSS: 125 cc.     URINE OUTPUT:  Minimal    Fluids: 600 cc Crystalloid    Specimen:  Vulvar mass and vulvar skin    Complications: no     FINDINGS:    On evaluation a large left soft mobile vulvar mass was visualized and palpated.    PROCEDURE IN DETAIL:     Prior to the procedure the patient was seen evaluated in the preoperative area.  The pros cons risks benefits alternatives indications of the procedure once again reviewed discussed with the patient.  The patient's questions were answered and she is in agreement with proceeding with the surgical plan.    The patient was taken back to the [facility] surgical suite.  General anesthesia with LMA was initiated by the anesthesia department.  A sterile prep and drape was performed at this time in the usual manner.    A time-out procedure was now performed with all participating parties in agreement as to the preoperative and operative plan.  The patient received preoperative antibiotics and sequential compression hose were on.    The patient's bladder was drained of a small amount of clear urine.    The left vulvar area was evaluated.  Approximately 10 cc of 0.25% plain Marcaine was injected circumferentially around the mass.      The 12:00 and 6:00 positions were grasped with 2 Allis clamps and an elliptical incision was made into the vulvar skin.  The tissue was excised free with Metzenbaum scissors and the underlying mass was visualized and evaluated.      The Allis clamps were now placed at the 3:00 a.m. and 9:00 a.m. positions and using the Metzenbaum scissors in a sharp fashion circumferential dissection was performed laterally superiorly and  inferiorly to the mass.  Areas of active bleeding were addressed with the Bovie.  Ultimately in a circumferential manner with dissection the mass was excised free.  The mass was handed off the field.    Lateral to lateral 0 Vicryl interrupted sutures were now placed into the bed of the incision site starting deep and in layers to close the defect and for hemostasis.  The vulvar skin/incision was now closed with a running 3-0 Vicryl suture.  The area was closely monitored and good hemostasis was noted.    The patient tolerated procedure well.  Final count was reported as correct per nursing.     The patient was taken to the recovery room in stable condition.      Miguel Ángel Arrington MD  OBGYN [facility]

## 2022-11-18 NOTE — PLAN OF CARE
Patient is being driven home by her . She is being sent home with the ice pack that was used in recovery.

## 2022-11-18 NOTE — H&P
Progress Notes    Miguel Ángel Arrington MD at 10/31/2022  2:00 PM    Status: Signed         Subjective:   Chief Complaint:  Abscess (Labial abscess)         Patient ID: Patricia James is a  32 y.o. female.     Contraception:  Vasectomy     Date: 10/31/2022     The patient presents today due to the following:     She has apparently been seen on multiple occasions due to a swelling on the left labia.  She was initially seen by gynecology and referred to general surgery.  An attempted drainage of the area was performed by General surgery but no fluid was removed and she was subsequent referred for further gynecologic evaluation.     She reports that the swelling was not painful until drainage was attempted.     From a gyn standpoint she is currently doing well without complaints.     GYN & OB History  Patient's last menstrual period was 10/30/2022 (exact date).      Date of Last Pap: No result found                      OB History    Para Term  AB Living   2 2 2     2   SAB IAB Ectopic Multiple Live Births                         # Outcome Date GA Lbr Vicne/2nd Weight Sex Delivery Anes PTL Lv   2 Term                     1 Term                         Obstetric Comments    x 2                 Past Medical History:   Diagnosis Date    Renal disorder       kidney stone               Past Surgical History:   Procedure Laterality Date     SECTION         x 2    WRIST ARTHROSCOPY W/ TRIANGULAR FIBROCARTILAGE REPAIR                   Review of patient's allergies indicates:   Allergen Reactions    Keflex [cephalexin]         rash    Latex, natural rubber Hives         Medications     Current Outpatient Medications:     metFORMIN (GLUCOPHAGE-XR) 500 MG ER 24hr tablet, Take 1 tablet (500 mg total) by mouth daily with breakfast. (Patient not taking: Reported on 10/31/2022), Disp: 90 tablet, Rfl: 3         Review of Systems (at today's evaluation)  Review of Systems   Constitutional:   Negative for fever and unexpected weight change.   HENT: Negative.     Respiratory:  Negative for cough and shortness of breath.    Cardiovascular:  Negative for chest pain.   Gastrointestinal:  Negative for abdominal pain, nausea and vomiting.   Genitourinary:  Negative for dysuria and urgency.          Gyn as per HPI   Musculoskeletal:  Negative for myalgias.   Integumentary:  Negative for rash.   Neurological: Negative.  Negative for headaches.   Breast: negative.       Objective:       Vitals:     10/31/22 1400   BP: 128/86      Physical Exam:   Constitutional: She appears well-developed and well-nourished.    HENT:   Head: Normocephalic.     Neck: No thyroid mass present.    Cardiovascular:  Normal rate, regular rhythm and normal heart sounds.             Pulmonary/Chest: Effort normal and breath sounds normal.         Abdominal: Soft. There is no abdominal tenderness.     Genitourinary:    Inguinal canal, vagina, uterus, right adnexa and left adnexa normal.            Pelvic exam was performed with patient supine.   The external female genitalia was normal.   No external genitalia lesions identified,Cervix is normal. Right adnexum displays no mass and no tenderness. Left adnexum displays no mass and no tenderness. No tenderness or bleeding in the vagina. Uterus is not tender. Normal urethral meatus.Urethra findings: no tendernessBladder findings: no bladder tenderness          Musculoskeletal:      Right lower leg: No edema.      Left lower leg: No edema.      Lymphadenopathy:     She has no cervical adenopathy. No inguinal adenopathy noted on the right or left side.    Neurological: She is alert.    Skin: Skin is warm and dry.    Psychiatric: Mood normal.             9/19/2022        Narrative & Impression  Ultrasound pelvis Limited     CLINICAL DATA: Left-sided labial mass     FINDINGS: Sonographic assessment targeted to the vulva was performed.     Corresponding with the reported clinical history, there is  an oval shaped well marginated mass measuring 5.4 x 1.2 x 5.0 cm at the labia on the left. The mass is homogeneously hypoechoic and probably complex cystic. Primary differential possibilities would include complex Bartholin's duct cyst or epidermal cyst. Abscess is not excluded and should be excluded clinically. Lipoma is possible but felt less likely. CT could be utilized for further differentiation if warranted clinically.     IMPRESSION:  1. Well marginated left-sided labial mass with primary differential considerations as noted above. Please see above discussion and recommendations.     Electronically signed by:  Tevin Burt MD  9/19/2022 5:28 PM CDT Workstation: [id number]     Assessment:      1. Vulvar mass    2. Prediabetes          Plan:   Vulvar mass  -     Place in Outpatient; Standing  -     Full code; Standing  -     Vital signs; Standing  -     Insert peripheral IV; Standing  -     Cline to Gravity; Standing  -     POCT glucose; Standing  -     Notify physician if BS > 180 for hysterectomy patients; Standing  -     Chlorhexidine (CHG) 2% Wipes; Standing  -     Notify Physician/Vital Signs Parameters Urine output less than 0.5mL/kg/hr (with indwelling catheter) or 30 mL/hr (without indwelling catheter) or blood glucose greater than 200 mg/dL; Standing  -     Notify physician; Standing  -     Notify Physician - Potential Need of Opioid Reversal; Standing  -     Diet NPO; Standing  -     Place sequential compression device; Standing  -     Chlorohexidine Gluconate Bath; Standing  -     Pregnancy, urine rapid; Standing  -     Case Request Operating Room: VULVECTOMY, EXCISION OF VULVAR MASS AND OTHER INDICATED PROCEDURES  -     Bed rest with bathroom privileges; Standing     Prediabetes     Other orders  -     0.9%  NaCl infusion  -     IP VTE LOW RISK PATIENT; Standing  -     mupirocin 2 % ointment  -     ceFAZolin (ANCEF) 2 g in dextrose 5 % 50 mL IVPB        Follow up for As needed or 2 weeks  following surgery.         The above was reviewed and discussed with the patient.     We discussed her current issues with a left vulvar mass.  We discussed findings on physical exam as well as recent radiologic studies..     Conservative, medical, and surgical interventions were discussed, and the patient would like to proceed with surgical intervention.  She will be scheduled for a EXCISION OF VULVAR MASS AND OTHER INDICATED PROCEDURES     The pros, cons, risks, benefits, alternatives, and indications of the surgical procedure were discussed in detail with the patient.  We discussed the possibility of allergic reactions, bleeding, infection damage to cervix, uterus, bladder, ureters, bowel, and other abdominal pelvic organs.  Issues unique to this procedure were discussed.     The patient's questions regarding above were answered and she agrees with this plan.  The patient was provided with the informed consent form and given times reviewed the form.  Questions were answered and consent was obtained        The patient's questions were answered, and she is in agreement with the current plan.

## 2022-11-21 ENCOUNTER — HOSPITAL ENCOUNTER (EMERGENCY)
Facility: HOSPITAL | Age: 32
Discharge: HOME | End: 2022-11-21
Attending: EMERGENCY MEDICINE
Payer: MEDICAID

## 2022-11-21 VITALS
WEIGHT: 220 LBS | BODY MASS INDEX: 37.56 KG/M2 | DIASTOLIC BLOOD PRESSURE: 70 MMHG | OXYGEN SATURATION: 99 % | TEMPERATURE: 97 F | HEART RATE: 76 BPM | RESPIRATION RATE: 19 BRPM | HEIGHT: 64 IN | SYSTOLIC BLOOD PRESSURE: 133 MMHG

## 2022-11-21 VITALS
HEIGHT: 64 IN | DIASTOLIC BLOOD PRESSURE: 91 MMHG | OXYGEN SATURATION: 100 % | HEART RATE: 84 BPM | SYSTOLIC BLOOD PRESSURE: 135 MMHG | RESPIRATION RATE: 16 BRPM | WEIGHT: 226 LBS | TEMPERATURE: 98 F | BODY MASS INDEX: 38.58 KG/M2

## 2022-11-21 DIAGNOSIS — G89.18 POST-OP PAIN: Primary | ICD-10-CM

## 2022-11-21 LAB
BASOPHILS # BLD AUTO: 0.06 K/UL (ref 0–0.2)
BASOPHILS NFR BLD: 0.6 % (ref 0–1.9)
DIFFERENTIAL METHOD: ABNORMAL
EOSINOPHIL # BLD AUTO: 0.3 K/UL (ref 0–0.5)
EOSINOPHIL NFR BLD: 2.9 % (ref 0–8)
ERYTHROCYTE [DISTWIDTH] IN BLOOD BY AUTOMATED COUNT: 13.3 % (ref 11.5–14.5)
HCT VFR BLD AUTO: 38 % (ref 37–48.5)
HGB BLD-MCNC: 12.8 G/DL (ref 12–16)
IMM GRANULOCYTES # BLD AUTO: 0.07 K/UL (ref 0–0.04)
IMM GRANULOCYTES NFR BLD AUTO: 0.7 % (ref 0–0.5)
LYMPHOCYTES # BLD AUTO: 2.5 K/UL (ref 1–4.8)
LYMPHOCYTES NFR BLD: 23.4 % (ref 18–48)
MCH RBC QN AUTO: 27.8 PG (ref 27–31)
MCHC RBC AUTO-ENTMCNC: 33.7 G/DL (ref 32–36)
MCV RBC AUTO: 82 FL (ref 82–98)
MONOCYTES # BLD AUTO: 0.7 K/UL (ref 0.3–1)
MONOCYTES NFR BLD: 6.6 % (ref 4–15)
NEUTROPHILS # BLD AUTO: 7.1 K/UL (ref 1.8–7.7)
NEUTROPHILS NFR BLD: 65.8 % (ref 38–73)
NRBC BLD-RTO: 0 /100 WBC
PLATELET # BLD AUTO: 371 K/UL (ref 150–450)
PMV BLD AUTO: 10.4 FL (ref 9.2–12.9)
RBC # BLD AUTO: 4.61 M/UL (ref 4–5.4)
WBC # BLD AUTO: 10.75 K/UL (ref 3.9–12.7)

## 2022-11-21 PROCEDURE — 99284 EMERGENCY DEPT VISIT MOD MDM: CPT | Mod: 25

## 2022-11-21 PROCEDURE — 87389 HIV-1 AG W/HIV-1&-2 AB AG IA: CPT | Performed by: EMERGENCY MEDICINE

## 2022-11-21 PROCEDURE — 86803 HEPATITIS C AB TEST: CPT | Performed by: EMERGENCY MEDICINE

## 2022-11-21 PROCEDURE — 25000003 PHARM REV CODE 250: Performed by: EMERGENCY MEDICINE

## 2022-11-21 PROCEDURE — 96372 THER/PROPH/DIAG INJ SC/IM: CPT | Performed by: EMERGENCY MEDICINE

## 2022-11-21 PROCEDURE — 63600175 PHARM REV CODE 636 W HCPCS: Performed by: EMERGENCY MEDICINE

## 2022-11-21 PROCEDURE — 36415 COLL VENOUS BLD VENIPUNCTURE: CPT | Performed by: EMERGENCY MEDICINE

## 2022-11-21 PROCEDURE — 85025 COMPLETE CBC W/AUTO DIFF WBC: CPT | Performed by: EMERGENCY MEDICINE

## 2022-11-21 RX ORDER — OXYCODONE AND ACETAMINOPHEN 10; 325 MG/1; MG/1
1 TABLET ORAL EVERY 4 HOURS PRN
Qty: 12 TABLET | Refills: 0 | Status: SHIPPED | OUTPATIENT
Start: 2022-11-21 | End: 2022-11-30

## 2022-11-21 RX ORDER — SULFAMETHOXAZOLE AND TRIMETHOPRIM 800; 160 MG/1; MG/1
1 TABLET ORAL
Status: COMPLETED | OUTPATIENT
Start: 2022-11-21 | End: 2022-11-21

## 2022-11-21 RX ORDER — SULFAMETHOXAZOLE AND TRIMETHOPRIM 800; 160 MG/1; MG/1
1 TABLET ORAL 2 TIMES DAILY
Qty: 14 TABLET | Refills: 0 | Status: SHIPPED | OUTPATIENT
Start: 2022-11-21 | End: 2022-11-28

## 2022-11-21 RX ORDER — SULFAMETHOXAZOLE AND TRIMETHOPRIM 800; 160 MG/1; MG/1
1 TABLET ORAL 2 TIMES DAILY
Qty: 14 TABLET | Refills: 0 | Status: SHIPPED | OUTPATIENT
Start: 2022-11-21 | End: 2022-11-21 | Stop reason: SDUPTHER

## 2022-11-21 RX ORDER — KETOROLAC TROMETHAMINE 30 MG/ML
30 INJECTION, SOLUTION INTRAMUSCULAR; INTRAVENOUS
Status: COMPLETED | OUTPATIENT
Start: 2022-11-21 | End: 2022-11-21

## 2022-11-21 RX ORDER — MORPHINE SULFATE 10 MG/ML
10 INJECTION INTRAMUSCULAR; INTRAVENOUS; SUBCUTANEOUS
Status: COMPLETED | OUTPATIENT
Start: 2022-11-21 | End: 2022-11-21

## 2022-11-21 RX ORDER — OXYCODONE AND ACETAMINOPHEN 10; 325 MG/1; MG/1
1 TABLET ORAL EVERY 4 HOURS PRN
Qty: 12 TABLET | Refills: 0 | Status: SHIPPED | OUTPATIENT
Start: 2022-11-21 | End: 2022-11-21 | Stop reason: SDUPTHER

## 2022-11-21 RX ADMIN — KETOROLAC TROMETHAMINE 30 MG: 30 INJECTION, SOLUTION INTRAMUSCULAR; INTRAVENOUS at 10:11

## 2022-11-21 RX ADMIN — MORPHINE SULFATE 10 MG: 10 INJECTION INTRAVENOUS at 10:11

## 2022-11-21 RX ADMIN — SULFAMETHOXAZOLE AND TRIMETHOPRIM 1 TABLET: 800; 160 TABLET ORAL at 10:11

## 2022-11-22 ENCOUNTER — TELEPHONE (OUTPATIENT)
Dept: OBSTETRICS AND GYNECOLOGY | Facility: CLINIC | Age: 32
End: 2022-11-22
Payer: MEDICAID

## 2022-11-22 DIAGNOSIS — Z98.890 POSTOPERATIVE STATE: Primary | ICD-10-CM

## 2022-11-22 LAB
HCV AB SERPL QL IA: NORMAL
HIV 1+2 AB+HIV1 P24 AG SERPL QL IA: NORMAL

## 2022-11-22 RX ORDER — CHLORHEXIDINE GLUCONATE 40 MG/ML
SOLUTION TOPICAL DAILY PRN
Qty: 1 ML | Refills: 0 | Status: SHIPPED | OUTPATIENT
Start: 2022-11-22 | End: 2022-11-30

## 2022-11-22 RX ORDER — ONDANSETRON 4 MG/1
4 TABLET, FILM COATED ORAL EVERY 6 HOURS PRN
Qty: 20 TABLET | Refills: 0 | Status: SHIPPED | OUTPATIENT
Start: 2022-11-22 | End: 2022-11-30

## 2022-11-22 RX ORDER — LIDOCAINE AND PRILOCAINE 25; 25 MG/G; MG/G
CREAM TOPICAL
Status: SHIPPED | OUTPATIENT
Start: 2022-11-22

## 2022-11-22 NOTE — ED PROVIDER NOTES
"Encounter Date: 2022    SCRIBE #1 NOTE: I, Silva Jackson, am scribing for, and in the presence of,  Naveen Haider III, MD.     History     Chief Complaint   Patient presents with    Post-op Problem     Pt has cyst removed from left labia on Friday. Pain medications not working.      Time seen by provider: 9:25 PM on 2022    Patricia James is a 32 y.o. female who presents to the ED with post-op "burning" pain. The pt states she had a vulvectomy x4 days prior. She states Miguel Ángel Arrington MD. removed a cyst from her left labia. The pt states she was prescribed Ibuprofen 600mg and Oxycodone 325mg post-op but has experienced minimal relief. Her last intake of her pain medication was at 6pm earlier today. The pt reports having experienced a BM since surgery. The patient denies dysuria, fever or any other symptoms at this time. PMHx of kidney stone and DM. PSHx of Vulvectomy.    The history is provided by the patient.   Review of patient's allergies indicates:   Allergen Reactions    Keflex [cephalexin]      rash    Latex, natural rubber Hives     Past Medical History:   Diagnosis Date    Diabetes mellitus     Renal disorder     kidney stone     Past Surgical History:   Procedure Laterality Date     SECTION      x 2    VULVECTOMY Left 2022    Procedure: VULVECTOMY, EXCISION OF VULVAR MASS AND OTHER INDICATED PROCEDURES;  Surgeon: Miguel Ángel Arrington MD;  Location: [facility];  Service: OB/GYN;  Laterality: Left;    WRIST ARTHROSCOPY W/ TRIANGULAR FIBROCARTILAGE REPAIR       Family History   Problem Relation Age of Onset    Cancer Maternal Grandmother     Heart disease Maternal Grandmother     Leukemia Maternal Grandmother     Hyperlipidemia Father     Hypertension Father     Diabetes Father     Breast cancer Maternal Aunt     Ovarian cancer Maternal Aunt      Social History     Tobacco Use    Smoking status: Never    Smokeless tobacco: Never   Substance Use Topics    Alcohol use: Yes     " Comment: occasionally    Drug use: No     Review of Systems   Constitutional:  Negative for fever.   Respiratory:  Negative for shortness of breath.    Genitourinary:  Negative for dysuria and flank pain.   Musculoskeletal:  Negative for gait problem.   Skin:  Positive for wound (Post-op).   Neurological:  Negative for weakness.   Psychiatric/Behavioral:  Negative for confusion.      Physical Exam     Initial Vitals [11/21/22 2110]   BP Pulse Resp Temp SpO2   (!) 141/94 99 18 97.7 °F (36.5 °C) 98 %      MAP       --         Physical Exam    Nursing note and vitals reviewed.  Constitutional: She appears well-developed and well-nourished. She is not diaphoretic. No distress.   HENT:   Head: Normocephalic and atraumatic.   Right Ear: External ear normal.   Left Ear: External ear normal.   Nose: Nose normal.   Eyes: Conjunctivae and EOM are normal. Pupils are equal, round, and reactive to light.   Neck: Neck supple.   Normal range of motion.  Cardiovascular:  Normal rate, regular rhythm and normal heart sounds.           Pulmonary/Chest: Breath sounds normal. No respiratory distress. She has no wheezes. She has no rhonchi. She has no rales.   Genitourinary:    Pelvic exam was performed with patient supine.   There is tenderness on the left labia.    Genitourinary Comments: Induration of left labia. No perineal drainage.     Musculoskeletal:         General: No tenderness or edema. Normal range of motion.      Cervical back: Normal range of motion and neck supple.     Lymphadenopathy:     She has no cervical adenopathy.   Neurological: She is alert and oriented to person, place, and time. She has normal strength. No cranial nerve deficit or sensory deficit.   Skin: Skin is warm and dry. No rash and no abscess noted. No erythema.   Psychiatric: She has a normal mood and affect.         ED Course   Procedures  Labs Reviewed   CBC W/ AUTO DIFFERENTIAL - Abnormal; Notable for the following components:       Result Value     Immature Granulocytes 0.7 (*)     Immature Grans (Abs) 0.07 (*)     All other components within normal limits   HIV 1 / 2 ANTIBODY    Narrative:     Release to patient->Immediate   HEPATITIS C ANTIBODY    Narrative:     Release to patient->Immediate          Imaging Results    None          Medications   ketorolac injection 30 mg (30 mg Intramuscular Given 11/21/22 2229)   morphine injection 10 mg (10 mg Intramuscular Given 11/21/22 2230)   sulfamethoxazole-trimethoprim 800-160mg per tablet 1 tablet (1 tablet Oral Given 11/21/22 2249)     Medical Decision Making:   History:   Old Medical Records: I decided to obtain old medical records.  Clinical Tests:   Lab Tests: Ordered and Reviewed  ED Management:  32-year-old female presents with vaginal pain after recent surgery.  The wound appears to be healing appropriately.  She will be started empirically on Keflex due to suspected possible infection.  She is referred to her surgeon and encouraged to return for worsening symptoms.  I doubt abscess or systemic infection.     APC / Resident Notes:   I, Dr. Naveen Haider III, personally performed the services described in this documentation. All medical record entries made by the scribe were at my direction and in my presence.  I have reviewed the chart and agree that the record reflects my personal performance and is accurate and complete   Scribe Attestation:   Scribe #1: I performed the above scribed service and the documentation accurately describes the services I performed. I attest to the accuracy of the note.                   Clinical Impression:   Final diagnoses:  [G89.18] Post-op pain (Primary)      ED Disposition Condition    Discharge Stable          ED Prescriptions       Medication Sig Dispense Start Date End Date Auth. Provider    oxyCODONE-acetaminophen (PERCOCET)  mg per tablet  (Status: Discontinued) Take 1 tablet by mouth every 4 (four) hours as needed for Pain. 12 tablet 11/21/2022 11/21/2022  Naveen Haider III, MD    sulfamethoxazole-trimethoprim 800-160mg (BACTRIM DS) 800-160 mg Tab  (Status: Discontinued) Take 1 tablet by mouth 2 (two) times daily. for 7 days 14 tablet 2022 Naveen Haider III, MD    oxyCODONE-acetaminophen (PERCOCET)  mg per tablet () Take 1 tablet by mouth every 4 (four) hours as needed for Pain. Patient not taking:  Reported on 2022 12 tablet 2022 Naveen Haider III, MD    sulfamethoxazole-trimethoprim 800-160mg (BACTRIM DS) 800-160 mg Tab () Take 1 tablet by mouth 2 (two) times daily. for 7 days 14 tablet 2022 Naveen Haider III, MD          Follow-up Information       Follow up With Specialties Details Why Contact Info    Miguel Ángel Arrington MD Obstetrics and Gynecology In 2 days  [address]  [address]  [place] [facility] [id number]  [phone]               Naveen Haider III, MD  [phone]

## 2022-11-22 NOTE — TELEPHONE ENCOUNTER
"Mom, Ilana, states pt has pain at incision site and was seen in ED last night.  Ilana asked if pt can have a numbing cream for the incision as well as "Linda-Hex 4 antiseptic wash". A small bottle was given to her in ED. Ilana also states Percocet is making pt nauseous.  Please advise for nausea medication or different pain medication. Thanks  "

## 2022-11-25 ENCOUNTER — NURSE TRIAGE (OUTPATIENT)
Dept: ADMINISTRATIVE | Facility: CLINIC | Age: 32
End: 2022-11-25
Payer: MEDICAID

## 2022-11-25 ENCOUNTER — TELEPHONE (OUTPATIENT)
Dept: GYNECOLOGIC ONCOLOGY | Facility: HOSPITAL | Age: 32
End: 2022-11-25
Payer: MEDICAID

## 2022-11-25 DIAGNOSIS — Z90.79 H/O VULVECTOMY: Primary | ICD-10-CM

## 2022-11-25 RX ORDER — METHOCARBAMOL 500 MG/1
500 TABLET, FILM COATED ORAL 4 TIMES DAILY PRN
Qty: 40 TABLET | Refills: 0 | Status: SHIPPED | OUTPATIENT
Start: 2022-11-25 | End: 2022-11-30

## 2022-11-25 RX ORDER — OXYCODONE HYDROCHLORIDE 5 MG/1
5 TABLET ORAL EVERY 4 HOURS PRN
Qty: 20 TABLET | Refills: 0 | Status: SHIPPED | OUTPATIENT
Start: 2022-11-25 | End: 2022-11-30

## 2022-11-25 RX ORDER — MELOXICAM 15 MG/1
15 TABLET ORAL DAILY
Qty: 30 TABLET | Refills: 1 | Status: SHIPPED | OUTPATIENT
Start: 2022-11-25 | End: 2022-11-25 | Stop reason: CLARIF

## 2022-11-25 RX ORDER — ACETAMINOPHEN 325 MG/1
325 TABLET ORAL EVERY 6 HOURS PRN
Qty: 30 TABLET | Refills: 1 | COMMUNITY
Start: 2022-11-25 | End: 2022-11-30

## 2022-11-25 RX ORDER — AMOXICILLIN 250 MG
2 CAPSULE ORAL 2 TIMES DAILY
Qty: 30 TABLET | Refills: 1 | Status: SHIPPED | OUTPATIENT
Start: 2022-11-25 | End: 2022-11-30

## 2022-11-25 NOTE — TELEPHONE ENCOUNTER
Patient called nurse on call line. No doctor in the system for [place]. Nurse called [facility] and doctor would not take patient call. Therefore, I spoke with the patient.     Talked to patient who is about 7 days post op vulvectomy. Pictures in media. She presented to the ED 3 days after the surgery and images at that time obtained. She states she is in 8/10 pain, the top of the incision has opened, there is some bleeding, there is a white line along the back, there is a pulling sensation when she walks and its feels very tight. She is uncomfortabl eand concerned.    She has been taking iburpofen 600 mg during the day and an percocet at night. She was given 12 tablets on 11/21.     Discussed with her the high rates of wound dehiscence on vulvectomy and that any opening is ok and can heal without sutures in place. Discussed bleeding as normal unless saturation a pad. Patient is unsure If bleeding is from period or from incision or both. She has AUB and is due for a period. She is on no hormonal contraception. Gave bleeding precautions of > 1 pad in an hour. Patient had first bowel movement today since a week ago and it was hard and caused some anxiety. Discussed this was normal. She took a stool softener today. Encouraged her to take those daily until her BM were regular and to wipe front to back. Sent stool softener in to pharmacy.    For pain control, vulvectomy extremely painful procedure and her pain seems uncontrolled at this time being 8/10. Discussed with patient ibupofen/tylenol alternating every 3 hours and on top of that oxycodone 5 mg PRN sent in a new rx for her. Also sent in meloxicam discussed for muscle spasms and wouldn't necessarily help vulva, but could have mutli modal action, but more could help with her whole body tightness/tension as she is holding on to a lot of stress right now with the vulvectomy.     For vulvectomy care:   VULVAR CARE:  After urinating or having a bowel movement, do the  following to clean and dry the area of your incisions   - Use a moistened wipe, making sure to wipe front-to-back  - Use a small squirt bottle with lukewarm water to rinse the area  - Use a clean, soft towel to pat dry the area  - Use a blow dryer on a cool setting to dry the incisions     You may shower or take a tub bath following surgery   - When taking a tub bath to help relieve discomfort or to clean the area, use clear, warm water  - Do not use bubble bath or bath oil   - After bathing, use a clean, soft towel to pat dry the area   - Then use a blow dryer on a cool setting to dry the incisions    -Avoid wearing tight-fitting clothing over your incisions.  -You may use an ice pack on your vulva for up to 10 minutes at a time to help with swelling. Avoid placing ice directly on your skin.   -You may soak in a bathtub 4-6 times per day (10-15 minutes each time) to help with your vulvar discomfort and swelling.   -Stitches may have been placed at the time of surgery. These stitches are all dissolvable. As they begin to dissolve, you may notice yellow to white discharge, this is normal.    No alcoholic beverages while taking narcotic pain medications.  Do not drive while taking narcotic pain medication.    No douching, tampons, or intercourse for six weeks if you had any kind of procedure performed in the outside or inside of your vagina.     VAGINAL DISCHARGE: You may develop a vaginal discharge and intermittent vaginal spotting after surgery and up to 6 weeks postoperatively.  The discharge may have an odor and may change in color but it is normal.  This is due to dissolving stiches.  Contact your surgical team if you develop vaginal or vulvar irritation along with a discharge.  Also contact your surgical team if you have vaginal discharge that smells like urine or stool.    CONSTIPATION REMEDIES: Patients are often constipated after surgery or with use of oral narcotic medicine. You should continue to take the  stool softener, Senokot-S during the next six weeks, and consume adequate amounts of water.  If you have not had a bowel movement for 3 days after dismissal, or are uncomfortable and unable to pass stool, please try one or all of the following measures:  1.  Milk of Magnesia - 30 cc by mouth every 12 hours   2.  Dulcolax suppository - One suppository per rectum every 4-6 hours   3.  Metamucil, Fibercon or other bulk former - use as directed  4.  Fleets Enema  5.  Prunes or Prune juice    If you continue to have constipation after trying the above remedies, you should contact your surgical team using the contact information listed above      Ruth Ann Fernandez MD  OBGYN PGY-4

## 2022-11-25 NOTE — TELEPHONE ENCOUNTER
Reason for Disposition   [1] Follow-up call from patient regarding patient's clinical status AND [2] information urgent    Additional Information   Negative: Lab calling with strep throat test results and triager can call in prescription   Negative: Lab calling with urinalysis test results and triager can call in prescription   Negative: Medication questions   Negative: Medication renewal and refill questions   Negative: Pre-operative or pre-procedural questions   Negative: Call about patient who is currently hospitalized   Negative: Lab or radiology calling with CRITICAL test results   Negative: ED call to PCP (i.e., primary care provider; doctor, NP, or PA)   Negative: Doctor (or NP/PA) call to PCP    Protocols used: PCP Call - No Triage-A-  spoke with dr marley via secure chat MD shah to contact gyn onc re previous note. Spoke with dr oquendo re above. MD warm transferred to speak with pt.

## 2022-11-25 NOTE — TELEPHONE ENCOUNTER
Reason for Disposition   Severe pain in the incision    Additional Information   Negative: [1] Major abdominal surgical incision AND [2] wound gaping open AND [3] visible internal organs   Negative: Sounds like a life-threatening emergency to the triager   Negative: [1] Bleeding from incision AND [2] won't stop after 10 minutes of direct pressure   Negative: [1] Widespread rash AND [2] bright red, sunburn-like    Protocols used: Post-Op Incision Symptoms-A-AH   pt called re vulvectomy last Friday 11/18. today noticed incision open at the front and back is closed. when getting up feels pulling. pt noted a white line on rear of incision. afeb. on Bactrim. bleeding not heavy and not saturating pad. pt unclear if her period has started. pain = 8ish. on ibuprofen during day , oxy at night. rec ED or may speak with MD on call. pt requesting to speak with MD on call. Spoke with Shamika MATTHEWS - MD scrubbed into delivery and will call pt after.pt notified. Call back with questions

## 2022-11-28 ENCOUNTER — PATIENT MESSAGE (OUTPATIENT)
Dept: FAMILY MEDICINE | Facility: CLINIC | Age: 32
End: 2022-11-28
Payer: MEDICAID

## 2022-11-28 DIAGNOSIS — R73.03 PREDIABETES: ICD-10-CM

## 2022-11-28 NOTE — TELEPHONE ENCOUNTER
Contacted pt to check on her and offer her an appt today to be evaluated.  Pt states she is feeling better and is ok to keep her post op appt scheduled on 12/1/22.  Advised pt to contact office if she needs to be seen sooner and she voiced understanding.

## 2022-11-29 ENCOUNTER — PATIENT MESSAGE (OUTPATIENT)
Dept: OBSTETRICS AND GYNECOLOGY | Facility: CLINIC | Age: 32
End: 2022-11-29
Payer: MEDICAID

## 2022-11-30 ENCOUNTER — OFFICE VISIT (OUTPATIENT)
Dept: FAMILY MEDICINE | Facility: CLINIC | Age: 32
End: 2022-11-30
Payer: MEDICAID

## 2022-11-30 ENCOUNTER — TELEPHONE (OUTPATIENT)
Dept: FAMILY MEDICINE | Facility: CLINIC | Age: 32
End: 2022-11-30
Payer: MEDICAID

## 2022-11-30 VITALS
WEIGHT: 223 LBS | SYSTOLIC BLOOD PRESSURE: 130 MMHG | OXYGEN SATURATION: 97 % | HEIGHT: 64 IN | DIASTOLIC BLOOD PRESSURE: 88 MMHG | BODY MASS INDEX: 38.07 KG/M2 | HEART RATE: 92 BPM

## 2022-11-30 DIAGNOSIS — H93.11 TINNITUS OF RIGHT EAR: Primary | ICD-10-CM

## 2022-11-30 PROCEDURE — 3079F PR MOST RECENT DIASTOLIC BLOOD PRESSURE 80-89 MM HG: ICD-10-PCS | Mod: CPTII,S$GLB,, | Performed by: PHYSICIAN ASSISTANT

## 2022-11-30 PROCEDURE — 99213 PR OFFICE/OUTPT VISIT, EST, LEVL III, 20-29 MIN: ICD-10-PCS | Mod: S$GLB,,, | Performed by: PHYSICIAN ASSISTANT

## 2022-11-30 PROCEDURE — 3075F PR MOST RECENT SYSTOLIC BLOOD PRESS GE 130-139MM HG: ICD-10-PCS | Mod: CPTII,S$GLB,, | Performed by: PHYSICIAN ASSISTANT

## 2022-11-30 PROCEDURE — 3044F HG A1C LEVEL LT 7.0%: CPT | Mod: CPTII,S$GLB,, | Performed by: PHYSICIAN ASSISTANT

## 2022-11-30 PROCEDURE — 99213 OFFICE O/P EST LOW 20 MIN: CPT | Mod: S$GLB,,, | Performed by: PHYSICIAN ASSISTANT

## 2022-11-30 PROCEDURE — 3075F SYST BP GE 130 - 139MM HG: CPT | Mod: CPTII,S$GLB,, | Performed by: PHYSICIAN ASSISTANT

## 2022-11-30 PROCEDURE — 3008F BODY MASS INDEX DOCD: CPT | Mod: CPTII,S$GLB,, | Performed by: PHYSICIAN ASSISTANT

## 2022-11-30 PROCEDURE — 3008F PR BODY MASS INDEX (BMI) DOCUMENTED: ICD-10-PCS | Mod: CPTII,S$GLB,, | Performed by: PHYSICIAN ASSISTANT

## 2022-11-30 PROCEDURE — 3044F PR MOST RECENT HEMOGLOBIN A1C LEVEL <7.0%: ICD-10-PCS | Mod: CPTII,S$GLB,, | Performed by: PHYSICIAN ASSISTANT

## 2022-11-30 PROCEDURE — 3079F DIAST BP 80-89 MM HG: CPT | Mod: CPTII,S$GLB,, | Performed by: PHYSICIAN ASSISTANT

## 2022-11-30 RX ORDER — METFORMIN HYDROCHLORIDE 500 MG/1
500 TABLET, EXTENDED RELEASE ORAL
Qty: 90 TABLET | Refills: 3 | Status: SHIPPED | OUTPATIENT
Start: 2022-11-30 | End: 2023-11-30

## 2022-11-30 NOTE — TELEPHONE ENCOUNTER
----- Message from Shan Urias sent at 11/30/2022  7:36 AM CST -----  Type:  Same Day Appointment Request    Caller is requesting a same day appointment.  Caller declined first available appointment listed below.      Name of Caller:  pt  When is the first available appointment?  none  Symptoms:  Ear Pain/ leg numb on side of her leg--said she need to be seen today--please call and advise  Best Call Back Number:  [phone] (home) [phone] (work)    Additional Information:   thank you

## 2022-11-30 NOTE — PROGRESS NOTES
Subjective:       Patient ID: Patricia James is a 32 y.o. female.    Chief Complaint: Otalgia (Right ear for 1 week)    Otalgia   There is pain in the right ear. This is a new problem. The current episode started in the past 7 days. The problem occurs constantly. The problem has been unchanged. There has been no fever. The patient is experiencing no pain. Pertinent negatives include no abdominal pain, hearing loss or sore throat. Associated symptoms comments: Buzzing sensation. She has tried nothing for the symptoms. The treatment provided no relief.   Review of Systems   Constitutional:  Negative for activity change, appetite change, chills, fatigue and fever.   HENT:  Positive for ear pain. Negative for nasal congestion, hearing loss, postnasal drip, sinus pressure/congestion and sore throat.    Respiratory:  Negative for chest tightness, shortness of breath and wheezing.    Cardiovascular:  Negative for chest pain and leg swelling.   Gastrointestinal:  Negative for abdominal pain and blood in stool.   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     Appearance: Normal appearance. She is not ill-appearing, toxic-appearing or diaphoretic.   HENT:      Head: Normocephalic and atraumatic.      Right Ear: Tympanic membrane, ear canal and external ear normal. There is no impacted cerumen.      Left Ear: Tympanic membrane, ear canal and external ear normal. There is no impacted cerumen.      Nose: Nose normal. No congestion or rhinorrhea.   Cardiovascular:      Rate and Rhythm: Normal rate and regular rhythm.      Pulses: Normal pulses.      Heart sounds: Normal heart sounds. No murmur heard.    No friction rub. No gallop.   Pulmonary:      Effort: Pulmonary effort is normal. No  respiratory distress.      Breath sounds: Normal breath sounds. No stridor. No wheezing, rhonchi or rales.   Chest:      Chest wall: No tenderness.   Abdominal:      General: There is no distension.      Palpations: Abdomen is soft.   Musculoskeletal:      Cervical back: No rigidity.   Neurological:      Mental Status: She is alert.       Assessment:       Problem List Items Addressed This Visit    None  Visit Diagnoses       Tinnitus of right ear    -  Primary    Relevant Orders    Ambulatory referral/consult to ENT              Plan:       Tinnitus of right ear  -     Ambulatory referral/consult to ENT; Future; Expected date: 12/07/2022

## 2022-12-01 ENCOUNTER — PATIENT MESSAGE (OUTPATIENT)
Dept: OBSTETRICS AND GYNECOLOGY | Facility: CLINIC | Age: 32
End: 2022-12-01

## 2022-12-01 ENCOUNTER — OFFICE VISIT (OUTPATIENT)
Dept: OBSTETRICS AND GYNECOLOGY | Facility: CLINIC | Age: 32
End: 2022-12-01
Payer: MEDICAID

## 2022-12-01 VITALS
SYSTOLIC BLOOD PRESSURE: 127 MMHG | BODY MASS INDEX: 39.01 KG/M2 | DIASTOLIC BLOOD PRESSURE: 82 MMHG | WEIGHT: 227.31 LBS

## 2022-12-01 DIAGNOSIS — Z98.890 POSTOPERATIVE STATE: Primary | ICD-10-CM

## 2022-12-01 DIAGNOSIS — N90.89 VULVAR MASS: ICD-10-CM

## 2022-12-01 PROCEDURE — 99999 PR PBB SHADOW E&M-EST. PATIENT-LVL III: ICD-10-PCS | Mod: PBBFAC,,, | Performed by: OBSTETRICS & GYNECOLOGY

## 2022-12-01 PROCEDURE — 3079F DIAST BP 80-89 MM HG: CPT | Mod: CPTII,,, | Performed by: OBSTETRICS & GYNECOLOGY

## 2022-12-01 PROCEDURE — 3074F SYST BP LT 130 MM HG: CPT | Mod: CPTII,,, | Performed by: OBSTETRICS & GYNECOLOGY

## 2022-12-01 PROCEDURE — 3008F BODY MASS INDEX DOCD: CPT | Mod: CPTII,,, | Performed by: OBSTETRICS & GYNECOLOGY

## 2022-12-01 PROCEDURE — 99024 PR POST-OP FOLLOW-UP VISIT: ICD-10-PCS | Mod: ,,, | Performed by: OBSTETRICS & GYNECOLOGY

## 2022-12-01 PROCEDURE — 3044F HG A1C LEVEL LT 7.0%: CPT | Mod: CPTII,,, | Performed by: OBSTETRICS & GYNECOLOGY

## 2022-12-01 PROCEDURE — 3044F PR MOST RECENT HEMOGLOBIN A1C LEVEL <7.0%: ICD-10-PCS | Mod: CPTII,,, | Performed by: OBSTETRICS & GYNECOLOGY

## 2022-12-01 PROCEDURE — 3008F PR BODY MASS INDEX (BMI) DOCUMENTED: ICD-10-PCS | Mod: CPTII,,, | Performed by: OBSTETRICS & GYNECOLOGY

## 2022-12-01 PROCEDURE — 3079F PR MOST RECENT DIASTOLIC BLOOD PRESSURE 80-89 MM HG: ICD-10-PCS | Mod: CPTII,,, | Performed by: OBSTETRICS & GYNECOLOGY

## 2022-12-01 PROCEDURE — 99024 POSTOP FOLLOW-UP VISIT: CPT | Mod: ,,, | Performed by: OBSTETRICS & GYNECOLOGY

## 2022-12-01 PROCEDURE — 99999 PR PBB SHADOW E&M-EST. PATIENT-LVL III: CPT | Mod: PBBFAC,,, | Performed by: OBSTETRICS & GYNECOLOGY

## 2022-12-01 PROCEDURE — 3074F PR MOST RECENT SYSTOLIC BLOOD PRESSURE < 130 MM HG: ICD-10-PCS | Mod: CPTII,,, | Performed by: OBSTETRICS & GYNECOLOGY

## 2022-12-01 PROCEDURE — 1159F MED LIST DOCD IN RCRD: CPT | Mod: CPTII,,, | Performed by: OBSTETRICS & GYNECOLOGY

## 2022-12-01 PROCEDURE — 99213 OFFICE O/P EST LOW 20 MIN: CPT | Mod: PBBFAC,PO | Performed by: OBSTETRICS & GYNECOLOGY

## 2022-12-01 PROCEDURE — 1159F PR MEDICATION LIST DOCUMENTED IN MEDICAL RECORD: ICD-10-PCS | Mod: CPTII,,, | Performed by: OBSTETRICS & GYNECOLOGY

## 2022-12-01 NOTE — PROGRESS NOTES
Subjective:   Chief Complaint:  Post-op Evaluation (results)       Patient ID: Patricia James is a  32 y.o. female.    2022     The patient is status post:  Excision of left vulvar mass on 2022.    The patient was evaluated in the emergency room on 2022 due to vulvar pain and swelling.  Since that time she reports an improvement in her pain swelling and is currently doing well and without complaints.   She denies any postoperative fevers.   No significant postoperative GI or urologic issues.    No significant postoperative pain.     The patient reports a small amount of bloody discharge from the incision site but believes this is secondary to the sutures.  She reports with time the overall site is improved less painful.    Pathology:  Pending    GYN & OB History  Patient's last menstrual period was 10/30/2022 (exact date).     Date of Last Pap: No result found    OB History    Para Term  AB Living   2 2 2     2   SAB IAB Ectopic Multiple Live Births                  # Outcome Date GA Lbr Vince/2nd Weight Sex Delivery Anes PTL Lv   2 Term            1 Term               Obstetric Comments    x 2         Past Medical History:   Diagnosis Date    Diabetes mellitus     Renal disorder     kidney stone        Past Surgical History:   Procedure Laterality Date     SECTION      x 2    VULVECTOMY Left 2022    Procedure: VULVECTOMY, EXCISION OF VULVAR MASS AND OTHER INDICATED PROCEDURES;  Surgeon: Miguel Ángel Arrington MD;  [facility];  Service: OB/GYN;  Laterality: Left;    WRIST ARTHROSCOPY W/ TRIANGULAR FIBROCARTILAGE REPAIR          Review of Systems  Review of Systems   Constitutional:  Negative for fever.        As per HPI, otherwise no significant postoperative unexpected issues are noted   Respiratory: Negative.     Cardiovascular:  Negative for chest pain.   Gastrointestinal:  Negative for abdominal pain, constipation and nausea.   Genitourinary:   Negative for dysuria and hematuria.   Neurological: Negative.          Objective:      Vitals:    12/01/22 1026   BP: 127/82     Physical Exam:   Constitutional: She appears well-developed and well-nourished.    HENT:   Head: Normocephalic.     Neck: No thyroid mass present.    Cardiovascular:  Normal rate, regular rhythm and normal heart sounds.             Pulmonary/Chest: Effort normal and breath sounds normal.        Abdominal: Soft. There is no abdominal tenderness.     Genitourinary:    Inguinal canal, vagina, uterus, right adnexa and left adnexa normal.            Pelvic exam was performed with patient supine.   The external female genitalia was normal.   No external genitalia lesions identified,Cervix is normal. Right adnexum displays no mass and no tenderness. Left adnexum displays no mass and no tenderness. No tenderness or bleeding in the vagina. Uterus is not tender. Normal urethral meatus.Urethra findings: no tendernessBladder findings: no bladder tenderness          Musculoskeletal:      Right lower leg: No edema.      Left lower leg: No edema.      Lymphadenopathy:     She has no cervical adenopathy. No inguinal adenopathy noted on the right or left side.    Neurological: She is alert.    Skin: Skin is warm and dry.    Psychiatric: Mood normal.        Assessment:        1. Postoperative state    2. Vulvar mass        Plan:      Postoperative state    Vulvar mass       Follow up in about 2 weeks (around 12/15/2022) for Follow-up on today's evaluation.        The above was reviewed discussed with the patient including the findings at time of surgery and photographs.    We reviewed her recent ER evaluation and current findings.  From a postoperative standpoint the patient is currently doing well.  We will await the results of pathology  Her questions regarding the above were answered and she is in agreement with the current plan.

## 2022-12-02 ENCOUNTER — TELEPHONE (OUTPATIENT)
Dept: OBSTETRICS AND GYNECOLOGY | Facility: CLINIC | Age: 32
End: 2022-12-02
Payer: MEDICAID

## 2022-12-02 NOTE — TELEPHONE ENCOUNTER
Contacted [facility] pathology and asked about status of pathology result collected 11/18. Dorene with pathology explained that because it was collected on a Friday, it didn't get sent to main campus until Monday, 11/21/22.  They didn't have anyone to read pathology 11/21/22 to 11/25/22 due to Thanksgiving. She said the specimen was embedded on 11/27 and distributed to the pathologist on 11/30.  The pathologist has requested additional stains and waiting on the stains.      Pt notified in summary that the specimen is in process and will probably be available next week.  Explained for the delay due to holiday and she voiced understanding.

## 2022-12-05 ENCOUNTER — PATIENT MESSAGE (OUTPATIENT)
Dept: FAMILY MEDICINE | Facility: CLINIC | Age: 32
End: 2022-12-05
Payer: MEDICAID

## 2022-12-05 ENCOUNTER — PATIENT MESSAGE (OUTPATIENT)
Dept: OBSTETRICS AND GYNECOLOGY | Facility: CLINIC | Age: 32
End: 2022-12-05
Payer: MEDICAID

## 2022-12-05 LAB
FINAL PATHOLOGIC DIAGNOSIS: NORMAL
GROSS: NORMAL
Lab: NORMAL

## 2022-12-05 NOTE — TELEPHONE ENCOUNTER
Well you were not anemic 2 weeks ago according to the lab work. But taking an otc  iron supplement may improve energy.

## 2022-12-15 ENCOUNTER — OFFICE VISIT (OUTPATIENT)
Dept: OBSTETRICS AND GYNECOLOGY | Facility: CLINIC | Age: 32
End: 2022-12-15
Payer: MEDICAID

## 2022-12-15 VITALS
HEIGHT: 64 IN | SYSTOLIC BLOOD PRESSURE: 130 MMHG | WEIGHT: 225.06 LBS | RESPIRATION RATE: 16 BRPM | BODY MASS INDEX: 38.42 KG/M2 | DIASTOLIC BLOOD PRESSURE: 80 MMHG

## 2022-12-15 DIAGNOSIS — Z98.890 POSTOPERATIVE STATE: Primary | ICD-10-CM

## 2022-12-15 DIAGNOSIS — D36.9: ICD-10-CM

## 2022-12-15 PROCEDURE — 99024 POSTOP FOLLOW-UP VISIT: CPT | Mod: ,,, | Performed by: OBSTETRICS & GYNECOLOGY

## 2022-12-15 PROCEDURE — 1159F PR MEDICATION LIST DOCUMENTED IN MEDICAL RECORD: ICD-10-PCS | Mod: CPTII,,, | Performed by: OBSTETRICS & GYNECOLOGY

## 2022-12-15 PROCEDURE — 99024 PR POST-OP FOLLOW-UP VISIT: ICD-10-PCS | Mod: ,,, | Performed by: OBSTETRICS & GYNECOLOGY

## 2022-12-15 PROCEDURE — 3044F PR MOST RECENT HEMOGLOBIN A1C LEVEL <7.0%: ICD-10-PCS | Mod: CPTII,,, | Performed by: OBSTETRICS & GYNECOLOGY

## 2022-12-15 PROCEDURE — 3079F PR MOST RECENT DIASTOLIC BLOOD PRESSURE 80-89 MM HG: ICD-10-PCS | Mod: CPTII,,, | Performed by: OBSTETRICS & GYNECOLOGY

## 2022-12-15 PROCEDURE — 3075F PR MOST RECENT SYSTOLIC BLOOD PRESS GE 130-139MM HG: ICD-10-PCS | Mod: CPTII,,, | Performed by: OBSTETRICS & GYNECOLOGY

## 2022-12-15 PROCEDURE — 3044F HG A1C LEVEL LT 7.0%: CPT | Mod: CPTII,,, | Performed by: OBSTETRICS & GYNECOLOGY

## 2022-12-15 PROCEDURE — 3075F SYST BP GE 130 - 139MM HG: CPT | Mod: CPTII,,, | Performed by: OBSTETRICS & GYNECOLOGY

## 2022-12-15 PROCEDURE — 3079F DIAST BP 80-89 MM HG: CPT | Mod: CPTII,,, | Performed by: OBSTETRICS & GYNECOLOGY

## 2022-12-15 PROCEDURE — 99213 OFFICE O/P EST LOW 20 MIN: CPT | Mod: PBBFAC,PO | Performed by: OBSTETRICS & GYNECOLOGY

## 2022-12-15 PROCEDURE — 3008F PR BODY MASS INDEX (BMI) DOCUMENTED: ICD-10-PCS | Mod: CPTII,,, | Performed by: OBSTETRICS & GYNECOLOGY

## 2022-12-15 PROCEDURE — 3008F BODY MASS INDEX DOCD: CPT | Mod: CPTII,,, | Performed by: OBSTETRICS & GYNECOLOGY

## 2022-12-15 PROCEDURE — 99999 PR PBB SHADOW E&M-EST. PATIENT-LVL III: ICD-10-PCS | Mod: PBBFAC,,, | Performed by: OBSTETRICS & GYNECOLOGY

## 2022-12-15 PROCEDURE — 99999 PR PBB SHADOW E&M-EST. PATIENT-LVL III: CPT | Mod: PBBFAC,,, | Performed by: OBSTETRICS & GYNECOLOGY

## 2022-12-15 PROCEDURE — 1159F MED LIST DOCD IN RCRD: CPT | Mod: CPTII,,, | Performed by: OBSTETRICS & GYNECOLOGY

## 2022-12-15 NOTE — PROGRESS NOTES
Subjective:   Chief Complaint:  Post-op Evaluation (Vulvar mass )         Patient ID: Patricia James is a  32 y.o. female.    12/15/2022     The patient is status post:  Excision of left vulvar mass on 2022.    The patient was evaluated in the emergency room on 2022 due to vulvar pain and swelling.    Since her last evaluation she has been doing well.  She continues to have some discomfort along the lower portion of the incision but is overall improving with time.  She denies any postoperative fever   No significant postoperative GI or urologic issues.      Pathology:    Final Pathologic Diagnosis VULVAR MASS, EXCISION:   ANGIOMYOFIBROBLASTOMA, BENIGN.   THE LESION CONSISTS OF ALTERNATING HYPERCELLULAR AND HYPOCELLULAR AREAS   CONTAINING BLAND SPINDLE CELLS IN A BACKGROUND OF VARIABLY SIZED THIN-WALLED   VESSELS AND THIN WISPY COLLAGEN.  SPINDLE CELLS ARE POSITIVE FOCALLY WITH   DESMIN AND CD34.  ER IS DIFFUSELY POSITIVE.  AE1/AE3, SMOOTH MUSCLE ACTIN AND   S100 ARE NEGATIVE.  ALL THE CONTROLS STAIN APPROPRIATELY.  THE LESION   INVOLVES THE INKED DEEP MARGIN IN AREAS.        GYN & OB History  Patient's last menstrual period was 2022 (exact date).     Date of Last Pap: No result found    OB History    Para Term  AB Living   2 2 2     2   SAB IAB Ectopic Multiple Live Births                  # Outcome Date GA Lbr Vince/2nd Weight Sex Delivery Anes PTL Lv   2 Term            1 Term               Obstetric Comments    x 2         Past Medical History:   Diagnosis Date    Diabetes mellitus     Renal disorder     kidney stone        Past Surgical History:   Procedure Laterality Date     SECTION      x 2    VULVECTOMY Left 2022    Procedure: VULVECTOMY, EXCISION OF VULVAR MASS AND OTHER INDICATED PROCEDURES;  Surgeon: Miguel Ángel Arrington MD;  Location: [facility] to [facility];  Service: OB/GYN;  Laterality: Left;    WRIST ARTHROSCOPY W/ TRIANGULAR FIBROCARTILAGE REPAIR           Review of Systems  Review of Systems   Constitutional:  Negative for fever.        As per HPI, otherwise no significant postoperative unexpected issues are noted   Respiratory: Negative.     Cardiovascular:  Negative for chest pain.   Gastrointestinal:  Negative for abdominal pain, constipation and nausea.   Genitourinary:  Negative for dysuria and hematuria.   Neurological: Negative.          Objective:      Vitals:    12/15/22 1006   BP: 130/80   Resp: 16     Physical Exam:   Constitutional: She appears well-developed and well-nourished.    HENT:   Head: Normocephalic.     Neck: No thyroid mass present.    Cardiovascular:  Normal rate, regular rhythm and normal heart sounds.             Pulmonary/Chest: Effort normal and breath sounds normal.        Abdominal: Soft. There is no abdominal tenderness.     Genitourinary:    Inguinal canal, vagina, uterus, right adnexa and left adnexa normal.            Pelvic exam was performed with patient supine.   The external female genitalia was normal.   No external genitalia lesions identified,Cervix is normal. Right adnexum displays no mass and no tenderness. Left adnexum displays no mass and no tenderness. No tenderness or bleeding in the vagina. Uterus is not tender. Normal urethral meatus.Urethra findings: no tendernessBladder findings: no bladder tenderness          Musculoskeletal:      Right lower leg: No edema.      Left lower leg: No edema.      Lymphadenopathy:     She has no cervical adenopathy. No inguinal adenopathy noted on the right or left side.    Neurological: She is alert.    Skin: Skin is warm and dry.    Psychiatric: Mood normal.        Assessment:        No diagnosis found.      Plan:      There are no diagnoses linked to this encounter.       No follow-ups on file.        The above was reviewed discussed with the patient her significant other.    We reviewed the pathologic findings.      At this time the patient is overall doing well.    The  patient her significant other's questions were answered and they are in agreement with this plan.

## 2022-12-19 ENCOUNTER — PATIENT MESSAGE (OUTPATIENT)
Dept: FAMILY MEDICINE | Facility: CLINIC | Age: 32
End: 2022-12-19
Payer: MEDICAID

## 2022-12-19 NOTE — TELEPHONE ENCOUNTER
Pt has headache and nothing is working, no appt today. Would you approve nurse visit for Toradol or want her to have an appt?

## 2023-01-08 ENCOUNTER — PATIENT MESSAGE (OUTPATIENT)
Dept: OBSTETRICS AND GYNECOLOGY | Facility: CLINIC | Age: 33
End: 2023-01-08
Payer: MEDICAID

## 2023-01-11 ENCOUNTER — HOSPITAL ENCOUNTER (EMERGENCY)
Facility: HOSPITAL | Age: 33
Discharge: HOME | End: 2023-01-11
Attending: EMERGENCY MEDICINE
Payer: MEDICAID

## 2023-01-11 VITALS
WEIGHT: 225 LBS | SYSTOLIC BLOOD PRESSURE: 128 MMHG | HEART RATE: 93 BPM | DIASTOLIC BLOOD PRESSURE: 90 MMHG | BODY MASS INDEX: 38.41 KG/M2 | TEMPERATURE: 99 F | OXYGEN SATURATION: 97 % | HEIGHT: 64 IN | RESPIRATION RATE: 16 BRPM

## 2023-01-11 DIAGNOSIS — U07.1 COVID-19: Primary | ICD-10-CM

## 2023-01-11 LAB — B-HCG UR QL: NEGATIVE

## 2023-01-11 PROCEDURE — 99284 EMERGENCY DEPT VISIT MOD MDM: CPT

## 2023-01-11 PROCEDURE — 25000003 PHARM REV CODE 250: Performed by: NURSE PRACTITIONER

## 2023-01-11 PROCEDURE — 81025 URINE PREGNANCY TEST: CPT | Performed by: NURSE PRACTITIONER

## 2023-01-11 PROCEDURE — 63600175 PHARM REV CODE 636 W HCPCS: Performed by: NURSE PRACTITIONER

## 2023-01-11 PROCEDURE — 96372 THER/PROPH/DIAG INJ SC/IM: CPT | Performed by: NURSE PRACTITIONER

## 2023-01-11 RX ORDER — KETOROLAC TROMETHAMINE 30 MG/ML
15 INJECTION, SOLUTION INTRAMUSCULAR; INTRAVENOUS
Status: COMPLETED | OUTPATIENT
Start: 2023-01-11 | End: 2023-01-11

## 2023-01-11 RX ORDER — ACETAMINOPHEN 500 MG
1000 TABLET ORAL
Status: COMPLETED | OUTPATIENT
Start: 2023-01-11 | End: 2023-01-11

## 2023-01-11 RX ADMIN — ACETAMINOPHEN 1000 MG: 500 TABLET ORAL at 07:01

## 2023-01-11 RX ADMIN — KETOROLAC TROMETHAMINE 15 MG: 30 INJECTION, SOLUTION INTRAMUSCULAR; INTRAVENOUS at 07:01

## 2023-01-11 NOTE — FIRST PROVIDER EVALUATION
Emergency Department TeleTriage Encounter Note      CHIEF COMPLAINT    Chief Complaint   Patient presents with    Headache     Tested positive for covid 1/10 reports ongoing headache and not feeling well       VITAL SIGNS   Initial Vitals [01/11/23 1715]   BP Pulse Resp Temp SpO2   (!) 148/84 95 18 98.5 °F (36.9 °C) 99 %      MAP       --            ALLERGIES    Review of patient's allergies indicates:   Allergen Reactions    Keflex [cephalexin]      rash    Latex, natural rubber Hives       PROVIDER TRIAGE NOTE  This is a teletriage evaluation of a 32 y.o. female presenting to the ED complaining of headache. Patient positive for COVID yesterday. She reports headache not improving with ibuprofen and aspirin.    Patient is alert and oriented. She speaks in complete sentences. She is sitting upright in the chair in no distress.     Initial orders will be placed and care will be transferred to an alternate provider when patient is roomed for a full evaluation. Any additional orders and the final disposition will be determined by that provider.         ORDERS  Labs Reviewed - No data to display    ED Orders (720h ago, onward)      None              Virtual Visit Note: The provider triage portion of this emergency department evaluation and documentation was performed via [employer], a HIPAA-compliant telemedicine application, in concert with a tele-presenter in the room. A face to face patient evaluation with one of my colleagues will occur once the patient is placed in an emergency department room.      DISCLAIMER: This note was prepared with [employer] voice recognition transcription software. Garbled syntax, mangled pronouns, and other bizarre constructions may be attributed to that software system.

## 2023-01-12 NOTE — ED PROVIDER NOTES
Encounter Date: 2023       History     Chief Complaint   Patient presents with    Headache     Tested positive for covid 1/10 reports ongoing headache and not feeling well     HPI  Review of patient's allergies indicates:   Allergen Reactions    Keflex [cephalexin]      rash    Latex, natural rubber Hives     Past Medical History:   Diagnosis Date    Diabetes mellitus     Renal disorder     kidney stone     Past Surgical History:   Procedure Laterality Date     SECTION      x 2    VULVECTOMY Left 2022    Procedure: VULVECTOMY, EXCISION OF VULVAR MASS AND OTHER INDICATED PROCEDURES;  Surgeon: Miguel Ángel Arrington MD;  Location: [facility];  Service: OB/GYN;  Laterality: Left;    WRIST ARTHROSCOPY W/ TRIANGULAR FIBROCARTILAGE REPAIR       Family History   Problem Relation Age of Onset    Cancer Maternal Grandmother     Heart disease Maternal Grandmother     Leukemia Maternal Grandmother     Hyperlipidemia Father     Hypertension Father     Diabetes Father     Breast cancer Maternal Aunt     Ovarian cancer Maternal Aunt      Social History     Tobacco Use    Smoking status: Never    Smokeless tobacco: Never   Substance Use Topics    Alcohol use: Yes     Comment: occasionally    Drug use: No     Review of Systems    Physical Exam     Initial Vitals [23 1715]   BP Pulse Resp Temp SpO2   (!) 148/84 95 18 98.5 °F (36.9 °C) 99 %      MAP       --         Physical Exam    ED Course   Procedures  Labs Reviewed   PREGNANCY TEST, URINE RAPID    Narrative:     Specimen Source->Urine          Imaging Results    None          Medications   acetaminophen tablet 1,000 mg (1,000 mg Oral Given 23)   ketorolac injection 15 mg (15 mg Intramuscular Given 23)     Medical Decision Making:   Differential Diagnosis:   Headache  Covid-19  Cavernous sinus thrombosis     APC / Resident Notes:   Patient is a 32 y.o. female who presents to the ED 2023 who underwent emergent evaluation for headache.   Patient has known COVID-19.  I believe this is the etiology of her headache.  She has a normal neurological exam here today.  I do not think other emergent intracranial process such as subdural hematoma, intracranial hemorrhage, idiopathic intracranial hypertension, or cavernous sinus thrombosis.  I do not think emergent CT or MRI is indicated this time.  I do not think meningitis.  Patient not hypoxic or tachypneic.  Vital signs normal.  She is very well-appearing.  She is given medication for her headache.  She is also given Paxlovid for COVID-19 given her risk factors.  She is given FDA fact sheet on COVID-19 and Paxlovid and educated on risk versus benefit of taking this medication. Based on my clinical evaluation, I do not appreciate any immediate, emergent, or life threatening condition or etiology that warrants additional workup today and feel that the patient can be discharged with close follow up care.  Follow up and return precautions discussed; patient verbalized understanding and is agreeable to plan of care. Patient discharged home in stable condition.                Attending Attestation:           Physician Attestation for Scribe:  Physician Attestation Statement for Scribe #1: I, Mona Del Angel, reviewed documentation, as scribed by in my presence, and it is both accurate and complete.     Comments: I, Mona Del Angel NP-C, personally performed the services described in this documentation. All medical record entries made by the scribe were at my direction and in my presence.  I have reviewed the chart and agree that the record reflects my personal performance and is accurate and complete. KATIE Colunga.  9:05 PM 01/11/2023                     Clinical Impression:   Final diagnoses:  [U07.1] COVID-19 (Primary)        ED Disposition Condition    Discharge Stable          ED Prescriptions       Medication Sig Dispense Start Date End Date Auth. Provider    nirmatrelvir-ritonavir 300 mg (150 mg x 2)-100  mg copackaged tablets (EUA) Take 3 tablets by mouth 2 (two) times daily for 5 days. Each dose contains 2 nirmatrelvir (pink tablets) and 1 ritonavir (white tablet). Take all 3 tablets together 30 tablet 1/11/2023 1/16/2023 Mona Del Angel NP          Follow-up Information       Follow up With Specialties Details Why Contact Info    [facility] Internal Medicine In 3 days  [address] [facility] [place] [address] [phone]      [facility] Emergency Dept Emergency Medicine  As needed, If symptoms worsen [address] [place] [id number] [phone]             Mona Del Angel NP  01/11/23 2110

## 2023-01-12 NOTE — ED PROVIDER NOTES
Encounter Date: 2023    SCRIBE #1 NOTE: I, Loni Montano, am scribing for, and in the presence of,  KATIE Colunga.     History     Chief Complaint   Patient presents with    Headache     Tested positive for covid 1/10 reports ongoing headache and not feeling well     Time seen by provider: 7:09 PM on 2023    Patricia James is a 32 y.o. female with a PMHx of renal disorder and pre-DM who presents to the ED with an onset of a headache that has persisted for 3 days. She states that nothing has given any relief for this headache. The patient reports also having body ache, cough, and congestion which started 3 days ago as well. The patient tested positive for COVID yesterday. Currently for her symptoms she is taking Zyrtec. She is also taking Metformin due to pre-diabetes. The patient state that her menses is regular and she is not pregnant. She has never had COVID prior to now or been vaccinated.  The patient denies nausea, vomit, diarrhea or any other symptoms at this time.  No pertinent PSHx.      The history is provided by the patient.   Review of patient's allergies indicates:   Allergen Reactions    Keflex [cephalexin]      rash    Latex, natural rubber Hives     Past Medical History:   Diagnosis Date    Diabetes mellitus     Renal disorder     kidney stone     Past Surgical History:   Procedure Laterality Date     SECTION      x 2    VULVECTOMY Left 2022    Procedure: VULVECTOMY, EXCISION OF VULVAR MASS AND OTHER INDICATED PROCEDURES;  Surgeon: Miguel Ángel Arrington MD;  Location: [facility] OR;  Service: OB/GYN;  Laterality: Left;    WRIST ARTHROSCOPY W/ TRIANGULAR FIBROCARTILAGE REPAIR       Family History   Problem Relation Age of Onset    Cancer Maternal Grandmother     Heart disease Maternal Grandmother     Leukemia Maternal Grandmother     Hyperlipidemia Father     Hypertension Father     Diabetes Father     Breast cancer Maternal Aunt     Ovarian cancer Maternal Aunt      Social  History     Tobacco Use    Smoking status: Never    Smokeless tobacco: Never   Substance Use Topics    Alcohol use: Yes     Comment: occasionally    Drug use: No     Review of Systems   Constitutional:  Negative for fever.   HENT:  Positive for congestion. Negative for sore throat.    Respiratory:  Positive for cough. Negative for shortness of breath.    Cardiovascular:  Negative for chest pain.   Gastrointestinal:  Negative for diarrhea, nausea and vomiting.   Genitourinary:  Negative for dysuria.   Musculoskeletal:  Positive for myalgias. Negative for back pain.   Skin:  Negative for rash.   Neurological:  Positive for headaches. Negative for weakness.   Hematological:  Does not bruise/bleed easily.     Physical Exam     Initial Vitals [01/11/23 1715]   BP Pulse Resp Temp SpO2   (!) 148/84 95 18 98.5 °F (36.9 °C) 99 %      MAP       --         Physical Exam    Nursing note and vitals reviewed.  Constitutional: Vital signs are normal. She appears well-developed and well-nourished.   HENT:   Head: Normocephalic and atraumatic.   Eyes: Pupils are equal, round, and reactive to light.   Neck: Neck supple.   Cardiovascular:  Normal rate, regular rhythm, normal heart sounds and intact distal pulses.     Exam reveals no gallop and no friction rub.       No murmur heard.  Pulmonary/Chest: Breath sounds normal. She has no wheezes. She has no rhonchi. She has no rales.   Abdominal: Abdomen is soft. Bowel sounds are normal. There is no abdominal tenderness.   Musculoskeletal:      Cervical back: Neck supple.     Neurological: She is alert and oriented to person, place, and time. She has normal strength.   Cranial nerves III through XII grossly intact. 5/5 strength with intact sensation to BUE's and BLE's.       Skin: Skin is warm, dry and intact.   Psychiatric: She has a normal mood and affect. Her speech is normal and behavior is normal.       ED Course   Procedures  Labs Reviewed   PREGNANCY TEST, URINE RAPID           Imaging Results    None          Medications   acetaminophen tablet 1,000 mg (1,000 mg Oral Given 1/11/23 1948)   ketorolac injection 15 mg (15 mg Intramuscular Given 1/11/23 1949)     Medical Decision Making:   History:   Old Medical Records: I decided to obtain old medical records.  Clinical Tests:   Lab Tests: Ordered and Reviewed        Scribe Attestation:   Scribe #1: I performed the above scribed service and the documentation accurately describes the services I performed. I attest to the accuracy of the note.            ***       Clinical Impression:   Final diagnoses:  [U07.1] COVID-19 (Primary)        ED Disposition Condition    Discharge Stable          ED Prescriptions       Medication Sig Dispense Start Date End Date Auth. Provider    nirmatrelvir-ritonavir 300 mg (150 mg x 2)-100 mg copackaged tablets (EUA) Take 3 tablets by mouth 2 (two) times daily for 5 days. Each dose contains 2 nirmatrelvir (pink tablets) and 1 ritonavir (white tablet). Take all 3 tablets together 30 tablet 1/11/2023 1/16/2023 Mona Del Angel NP          Follow-up Information       Follow up With Specialties Details Why Contact Info    [facility] Internal Medicine In 3 days  [address] [facility] [address] [phone]      [facility] - Emergency Dept Emergency Medicine  As needed, If symptoms worsen [address] [place] [id number] [phone]

## 2023-01-17 ENCOUNTER — PATIENT OUTREACH (OUTPATIENT)
Dept: EMERGENCY MEDICINE | Facility: HOSPITAL | Age: 33
End: 2023-01-17
Payer: MEDICAID

## 2023-01-17 ENCOUNTER — PATIENT MESSAGE (OUTPATIENT)
Dept: ADMINISTRATIVE | Facility: HOSPITAL | Age: 33
End: 2023-01-17
Payer: MEDICAID

## 2023-01-17 NOTE — PROGRESS NOTES
Maryellen Arguello  ED Navigator  Emergency Department    Project: [facility] ED Navigator  Role: Community Health Worker    Date: 01/17/2023  Patient Name: Patricia James  MRN: [id number]  PCP: Layo Reis III, PA-C    Assessment:     Patricia James is a 32 y.o. female who has presented to ED for Headache. Patient has visited the ED 2 times in the past 3 months. Patient did not contact PCP.     ED Navigator Initial Assessment    ED Navigator Enrollment Documentation  Consent to Services  Does patient consent to completing the assessment?: Yes  Contact  Method of Initial Contact: Phone  Transportation  Does the patient have issues with Transportation?: No  Does the patient have transportation to and from healthcare appointments?: Yes  Insurance Coverage  Do you have coverage/adequate coverage?: Yes  Type/kind of coverage: Medicaid/Aetna  Is patient able to afford co-pays/deductibles?: Yes  Is patient able to afford HME or supplies?: Yes  Does patient have an established [facility] PCP?: Yes  Able to access?: Yes  Does the patient have a lack of adequate coverage?: No  Specialist Appointment  Did the patient come to the ED to see a specialist?: No  Does the patient have a pending specialist referral?: No  Does the patient have a specialist appointment made?: No  PCP Follow Up Appointment  Has the patient had an appointment with a primary care provider in the past year?: Yes  Approximate date: 11/30/22  Provider: Layo Reis III, PA-C  Does the patient have a follow up appontment with a PCP?: No  When was the last time you saw your PCP?: 11/30/22  Why does the patient not have a follow up scheduled?: Other (see comments)  Medications  Is patient able to afford medication?: Yes  Is patient unable to get medication due to lack of transportation?: No  Psychological  Does the patient have psycho-social concerns?: No  Food  Does the patient have concerns about food?: No  Communication/Education  Does the patient have limited  English proficiency/English not primary language?: No  Does patient have low literacy and/or low health literacy?: Yes  Does patient have concerns with care?: Yes  What concerns with care does the patient have?: Low priority placed on health  Does patient have dissatisfaction with care?: Yes  Other Financial Concerns  Does the patient have immediate financial distress?: No  Does the patient have general financial concerns?: Yes  Other Social Barriers/Concerns  Does the patient have any additional barriers or concerns?: Other (see comments), Dental  Primary Barrier  Barriers identified: Cognitive barrier (health literacy, language and communication, etc.)  Root Cause of ED Utilization: Patient Knowledge/Low Health Literacy  Plan to address Patient Knowledge/Low Health Literacy: Provided information for [facility] On [facility] 24/7 Nurse triage line [phone] or 1-866-[facility] ([phone])  Next steps: Provided Education  Was education/educational materials provided surrounding PCP services/creating a medical home?: Yes Was education verbal or written?: Written     Was education/educational materials provided surrounding low cost, healthy foods?: Yes Was education verbal or written?: Written     Was education/educational materials provided surrounding other items? If so, use comment to explain.: Yes Was education verbal or written?: Written   Plan: Utility payment assistance resource given for their region  Expected Date of Follow Up 1: 2/28/23         Social History     Socioeconomic History    Marital status: Legally    Tobacco Use    Smoking status: Never    Smokeless tobacco: Never   Substance and Sexual Activity    Alcohol use: Yes     Comment: occasionally    Drug use: No    Sexual activity: Not Currently     Partners: Male     Birth control/protection: Partner-Vasectomy     Social Determinants of Health     Financial Resource Strain: Low Risk     Difficulty of Paying Living Expenses: Not very hard    Food Insecurity: No Food Insecurity    Worried About Running Out of Food in the Last Year: Never true    Ran Out of Food in the Last Year: Never true   Transportation Needs: No Transportation Needs    Lack of Transportation (Medical): No    Lack of Transportation (Non-Medical): No   Stress: No Stress Concern Present    Feeling of Stress : Not at all   Social Connections: Unknown    Frequency of Communication with Friends and Family: Once a week    Frequency of Social Gatherings with Friends and Family: Once a week    Marital Status:    Housing Stability: Unknown    Unable to Pay for Housing in the Last Year: No    Unstable Housing in the Last Year: No       Plan:   Spoke with patient on the telephone and completed initial enrollment.  Patient denied any concerns with food, housing, utilities or transportation, however, she stated she is worried about paying her car payment.  Patient was recently diagnosed with Covid and I provided education on Covid resources available.  Patient also underwent a recent surgery with her Ob/Gyn and had a follow up appointment with him today.  Patient has a PCP whom she sees regularly, but does not have an appointment scheduled at this time.  Patient stated she has recently had to put $3000 in dental care on a credit card because her insurance did not cover procedures.  Patient denied needing assistance with scheduling/establishing other providers.  Patient stated she has a special needs child who is 13, and she stated he does not need anything at this time.  Patient reported no concerns with smoking, alcohol, or depression/anxiety.  Patient was given education on (The Right Care at the Right Level information, [facility] Virtual Visit information, and Heart healthy diet tips), [facility] Nurse Triage line, food pantries, and list of organizations that offer financial assistance with daily living expenses.    Maryellen Arguello  ED Navigator

## 2023-01-23 ENCOUNTER — PATIENT MESSAGE (OUTPATIENT)
Dept: FAMILY MEDICINE | Facility: CLINIC | Age: 33
End: 2023-01-23
Payer: MEDICAID

## 2023-02-13 ENCOUNTER — PATIENT OUTREACH (OUTPATIENT)
Dept: ADMINISTRATIVE | Facility: HOSPITAL | Age: 33
End: 2023-02-13
Payer: MEDICAID

## 2023-02-13 NOTE — PROGRESS NOTES
Non-compliant report chart audits. Chart review completed for HM test overdue (Mammogram, Colon Cancer Screening, Pap smear, DM labs, BP Check and/or Eye Exam)      Care Everywhere and media, updates requested and reviewed.        [facility] and [facility] reviewed.  Pap Smear not found.    Contacted pt about scheduling pap, pt has upcoming appt to have pap w/ Dr. Miguel Ángel Arrington MD.

## 2023-02-28 ENCOUNTER — PATIENT OUTREACH (OUTPATIENT)
Dept: EMERGENCY MEDICINE | Facility: HOSPITAL | Age: 33
End: 2023-02-28

## 2023-03-07 ENCOUNTER — PATIENT OUTREACH (OUTPATIENT)
Dept: EMERGENCY MEDICINE | Facility: HOSPITAL | Age: 33
End: 2023-03-07

## 2023-03-08 NOTE — PROGRESS NOTES
ED navigator spoke to Ms. Conn who advised that she was doing well. She advised that she had not had any follow up appointments. She denied the need for any new resources at this time. She was advised that I would follow up with her in a few weeks and that she could call if she needed anything. She agreed to a follow up call.     FIFI James  ED navigator

## 2023-03-30 ENCOUNTER — PATIENT MESSAGE (OUTPATIENT)
Dept: FAMILY MEDICINE | Facility: CLINIC | Age: 33
End: 2023-03-30
Payer: MEDICAID

## 2023-04-11 ENCOUNTER — PATIENT MESSAGE (OUTPATIENT)
Dept: ADMINISTRATIVE | Facility: HOSPITAL | Age: 33
End: 2023-04-11
Payer: MEDICAID

## 2023-04-18 ENCOUNTER — PATIENT MESSAGE (OUTPATIENT)
Dept: FAMILY MEDICINE | Facility: CLINIC | Age: 33
End: 2023-04-18
Payer: MEDICAID

## 2023-04-18 DIAGNOSIS — M79.643 PAIN OF HAND, UNSPECIFIED LATERALITY: Primary | ICD-10-CM

## 2023-06-19 ENCOUNTER — PATIENT MESSAGE (OUTPATIENT)
Dept: ADMINISTRATIVE | Facility: HOSPITAL | Age: 33
End: 2023-06-19
Payer: MEDICAID

## 2023-06-21 ENCOUNTER — PATIENT OUTREACH (OUTPATIENT)
Dept: ADMINISTRATIVE | Facility: HOSPITAL | Age: 33
End: 2023-06-21
Payer: MEDICAID

## 2023-06-21 NOTE — PROGRESS NOTES
Population Health Chart Review & Patient Outreach Details:     Reason for Outreach Encounter:     [x]  Non-Compliant Report   []  Payor Report (Humana, PHN, BCBS, MSSP, MCIP, UHC, etc.)   []  Pre-Visit Chart Review     Updates Requested / Reviewed:     []  Care Everywhere    []     []  External Sources ([facility], [facility], DIS, etc.)   []  Care Team Updated    Patient Outreach Method:    [x]  Telephone Outreach Completed   [x] Successful   [] Left Voicemail   [] Unable to Contact (wrong number, no voicemail)  []  [email or web address] Portal Outreach Sent  []  Letter Outreach Mailed  []  Fax Sent for External Records  []  External Records Upload    Health Maintenance Topics Addressed and Outreach Outcomes / Actions Taken:        []      Breast Cancer Screening []  Mammo Scheduled      []  External Records Requested     []  Added Reminder to Complete to Upcoming Primary Care Appt Notes     []  Patient Declined     []  Patient Will Call Back to Schedule     []  Patient Will Schedule with External Provider / Order Routed if Applicable             [x]       Cervical Cancer Screening []  Pap Scheduled      []  External Records Requested     []  Added Reminder to Complete to Upcoming Primary Care Appt Notes     []  Patient Declined     [x]  Patient Will Call Back to Schedule     []  Patient Will Schedule with External Provider               []          Colorectal Cancer Screening []  Colonoscopy Case Request or Referral Placed     []  External Records Requested     []  Added Reminder to Complete to Upcoming Primary Care Appt Notes     []  Patient Declined     []  Patient Will Call Back to Schedule     []  Patient Will Schedule with External Provider     []  Fit Kit Mailed (add the SmartPhrase under additional notes)     []  Reminded Patient to Complete Home Test             []      Diabetic Eye Exam []  Eye Camera Scheduled or Optometry Referral Placed     []  External Records Requested     []  Added Reminder to Complete to  Upcoming Primary Care Appt Notes     []  Patient Declined     []  Patient Will Call Back to Schedule     []  Patient Will Schedule with External Provider             []      Blood Pressure Control []  Primary Care Follow Up Visit Scheduled     []  Remote Blood Pressure Reading Captured     []  Added Reminder to Complete to Upcoming Primary Care Appt Notes     []  Patient Declined     []  Patient Will Call Back / Patient Will Send Portal Message with Reading     []  Patient Will Call Back to Schedule Provider Visit             []       HbA1c & Other Labs []  Lab Appt Scheduled for Due Labs     []  Primary Care Follow Up Visit Scheduled      []  Reminded Patient to Complete Home Test     []  Added Reminder to Complete to Upcoming Primary Care Appt Notes     []  Patient Declined     []  Patient Will Call Back to Schedule     []  Patient Will Schedule with External Provider / Order Routed if Applicable           []    Schedule Primary Care Appt []  Primary Care Appt Scheduled     []  Patient Declined     []  Patient Will Call Back to Schedule     []  Pt Established with External Provider & Updated Care Team             []      Medication Adherence []  Primary Care Appointment Scheduled     []  Added Reminder to Upcoming Primary Care Appt Notes     []  Patient Reminded to  Prescription     []  Patient Declined, Provider Notified if Needed     []  Sent Provider Message to Review and/or Add Exclusion to Problem List             []      Osteoporosis Screening []  DXA Appointment Scheduled     []  External Records Requested     []  Added Reminder to Complete to Upcoming Primary Care Appt Notes     []  Patient Declined     []  Patient Will Call Back to Schedule     []  Patient Will Schedule with External Provider / Order Routed if Applicable     Additional Care Coordinator Notes:         Further Action Needed If Patient Returns Outreach:

## 2023-07-03 ENCOUNTER — PATIENT OUTREACH (OUTPATIENT)
Dept: ADMINISTRATIVE | Facility: HOSPITAL | Age: 33
End: 2023-07-03
Payer: MEDICAID

## 2023-07-03 ENCOUNTER — PATIENT MESSAGE (OUTPATIENT)
Dept: ADMINISTRATIVE | Facility: HOSPITAL | Age: 33
End: 2023-07-03
Payer: MEDICAID

## 2023-07-03 NOTE — PROGRESS NOTES
Population Health Chart Review & Patient Outreach Details:     Reason for Outreach Encounter:     [x]  Non-Compliant Report   []  Payor Report (Humana, PHN, BCBS, MSSP, MCIP, C, etc.)   []  Pre-Visit Chart Review     Updates Requested / Reviewed:     [x]  Care Everywhere    [x]     []  External Sources ([facility], [facility], DIS, etc.)   []  Care Team Updated    Patient Outreach Method:    [x]  Telephone Outreach Completed   [] Successful   [] Left Voicemail   [x] Unable to Contact (wrong number, no voicemail)  []  [email or web address] Portal Outreach Sent  [x]  Letter Outreach Mailed  []  Fax Sent for External Records  []  External Records Upload    Health Maintenance Topics Addressed and Outreach Outcomes / Actions Taken:        []      Breast Cancer Screening []  Mammo Scheduled      []  External Records Requested     []  Added Reminder to Complete to Upcoming Primary Care Appt Notes     []  Patient Declined     []  Patient Will Call Back to Schedule     []  Patient Will Schedule with External Provider / Order Routed if Applicable             [x]       Cervical Cancer Screening []  Pap Scheduled      []  External Records Requested     []  Added Reminder to Complete to Upcoming Primary Care Appt Notes     []  Patient Declined     []  Patient Will Call Back to Schedule     []  Patient Will Schedule with External Provider               []          Colorectal Cancer Screening []  Colonoscopy Case Request or Referral Placed     []  External Records Requested     []  Added Reminder to Complete to Upcoming Primary Care Appt Notes     []  Patient Declined     []  Patient Will Call Back to Schedule     []  Patient Will Schedule with External Provider     []  Fit Kit Mailed (add the SmartPhrase under additional notes)     []  Reminded Patient to Complete Home Test             []      Diabetic Eye Exam []  Eye Camera Scheduled or Optometry Referral Placed     []  External Records Requested     []  Added Reminder to Complete  to Upcoming Primary Care Appt Notes     []  Patient Declined     []  Patient Will Call Back to Schedule     []  Patient Will Schedule with External Provider             []      Blood Pressure Control []  Primary Care Follow Up Visit Scheduled     []  Remote Blood Pressure Reading Captured     []  Added Reminder to Complete to Upcoming Primary Care Appt Notes     []  Patient Declined     []  Patient Will Call Back / Patient Will Send Portal Message with Reading     []  Patient Will Call Back to Schedule Provider Visit             []       HbA1c & Other Labs []  Lab Appt Scheduled for Due Labs     []  Primary Care Follow Up Visit Scheduled      []  Reminded Patient to Complete Home Test     []  Added Reminder to Complete to Upcoming Primary Care Appt Notes     []  Patient Declined     []  Patient Will Call Back to Schedule     []  Patient Will Schedule with External Provider / Order Routed if Applicable           []    Schedule Primary Care Appt []  Primary Care Appt Scheduled     []  Patient Declined     []  Patient Will Call Back to Schedule     []  Pt Established with External Provider & Updated Care Team             []      Medication Adherence []  Primary Care Appointment Scheduled     []  Added Reminder to Upcoming Primary Care Appt Notes     []  Patient Reminded to  Prescription     []  Patient Declined, Provider Notified if Needed     []  Sent Provider Message to Review and/or Add Exclusion to Problem List             []      Osteoporosis Screening []  DXA Appointment Scheduled     []  External Records Requested     []  Added Reminder to Complete to Upcoming Primary Care Appt Notes     []  Patient Declined     []  Patient Will Call Back to Schedule     []  Patient Will Schedule with External Provider / Order Routed if Applicable     Additional Care Coordinator Notes:    The patient was phoned about her GYN. She currently doesn't have a voicemail set up. I did send a portal message.      Further Action  Needed If Patient Returns Outreach:

## 2023-07-18 DIAGNOSIS — M65.841 STENOSING TENOSYNOVITIS OF FINGER OF RIGHT HAND: Primary | ICD-10-CM

## 2023-07-19 ENCOUNTER — CLINICAL SUPPORT (OUTPATIENT)
Dept: REHABILITATION | Facility: HOSPITAL | Age: 33
End: 2023-07-19
Payer: MEDICAID

## 2023-07-19 DIAGNOSIS — M65.841 STENOSING TENOSYNOVITIS OF FINGER OF RIGHT HAND: ICD-10-CM

## 2023-07-19 DIAGNOSIS — M79.641 RIGHT HAND PAIN: ICD-10-CM

## 2023-07-19 PROCEDURE — 97530 THERAPEUTIC ACTIVITIES: CPT | Mod: PN

## 2023-07-19 PROCEDURE — 97165 OT EVAL LOW COMPLEX 30 MIN: CPT | Mod: PN

## 2023-07-19 NOTE — PROGRESS NOTES
Patient evaluated and plan of care established.  Please sign and return if in agreement.    Thank you,  LAVELL Radford         Occupational Therapy [facility]  Initial Evaluation     Date: 2023  Name: Patricia James  Clinic Number: [id number]    Therapy Diagnosis: M65.841 (ICD-10-CM) - Stenosing tenosynovitis of finger of right hand  Encounter Diagnosis   Name Primary?    Right hand pain      Physician: Omega Fontanez MD    Physician Orders: M65.841 (ICD-10-CM) - Stenosing tenosynovitis of finger of right hand; evaluate and treat;  Paraffin, Ultrasound, slow progressive strengthening, progressive resistive exercises, range of motion, deep heat, massage, dry needling   Surgical Procedure and Date: Not Applicable   Dominant Side: Right  Date of Onset: over a year ago  History / Mechanism of Injury: I had two surgeries on my (Right) hand by Dr. Fontanez.  The surgeries were both for a scope surgeries for torn cartilage and ligament in the back part of my hand. I went back to him recently ( about a month ago) and he gave me an injection.  I had a lump in my forearm and that's where he gave me the injection.  The injection helped with the lump in my forearm but the pain is still there.   Previous Therapy:  none    Environmental Concerns/Fall Risk: None  Language: None  Cultural/Spiritual: None  Abuse/Neglect/Nutritional: None  Imaging / Tests: See Epic    Past Medical History/Physical Systems Review:    has a past medical history of Diabetes mellitus and Renal disorder.     has a past surgical history that includes  section; Wrist arthroscopy w/ triangular fibrocartilage repair; and Vulvectomy (Left, 2022).    has a current medication list which includes the following prescription(s): ibuprofen and metformin, and the following Facility-Administered Medications: lidocaine-prilocaine.    Review of patient's allergies indicates:   Allergen Reactions    Keflex [cephalexin]      rash    Latex, natural  "rubber Hives        Insurance Authorization Period Expiration: 2023-2023  Plan of Care Certification Period: 2023-10/19/2023  Date of Return to MD: approx. 2023    Occupation:   for [employer]  Working presently: employed  Workers Compensation:  no      Visit # / Visits authorized:   Time In:  10:55  Time Out: 11:35  Total Billable Time: 10  minutes        Precautions:  Patient. Reports, "no known Cancer, no pacemaker, no allergies to latex or adhesives."      Subjective     Patient Reports, "I  had two surgeries on my (Right) hand by Dr. Fontanez.  The surgeries were both for a scope surgeries for torn cartilage and ligament in the back part of my hand. I went back to him recently ( about a month ago) and he gave me an injection.  I had a lump in my forearm and that's where he gave me the injection.  The injection helped with the lump in my forearm but the pain is still there.  I have the most pain in my forearm and the small finger side of my wrist.  I am having difficulty with gripping items, shaking, pain, stiffness, opening containers.  When I went back to the doctor he did not give me any limitations. I just woke up one morning and the lump in my forearm was just there."    Pain   At rest:  6/10  With work/ Activity: 9/10  Sleepin/10  Location of Pain: (Right) hand    Patient's Goals for Therapy: decrease pain and swelling    Objective     Sensation:  grossly intact  Scar / Wound: Not Applicable   Edema:   centimeters (Right) / (Left)  Metacarpals:  21.5  / 20.9  Proximal Wrist Crease:  17.5  / 16.8  2" prox to Proximal Wrist Crease: 18.6  /  18.1                            Active Range of Motion: hand   Patient demo (Bilateral) composite digital flexion actively, and able to oppose (Bilateral) thumbs to base of 5th digit                                       Active Range of Motion: wrist                         (Right)   //  (Left)    Dorsal Flexion /Volar " Flexion:  35 / 30  //  60/70  Radial Deviation /Ulnar Deviation:  11 / 16  //  15/35  Supination / Pronation:  60 / 80  //  90/85    Passive Range of Motion: wrist                         (Right)       Dorsal Flexion /Volar Flexion : 45 / 45  Radial Deviation /Ulnar Deviation:  14 / 22  Supination / Pronation:  70 / 85    Manual Muscle Test:  Wrist        (Right)    Dorsal Flexion:  3-/5  Volar Flexion:  3-/5  Radial Deviation: 3-/5  Ulnar Deviation: 3-/5       Strength: (AGUILAR Dynamometer in pounds),Position II  (Right):  40  (Left):  45    Pinch Strength: (Pinch Gauge in pounds)             (Right) /  (Left)  Lateral Pinch:  17 / 16  3 Point Pinch:  13 / 17  2 Point Pinch:  9 / 9        FOTO SCORE: 52%      Treatment Included:   Occupational Therapy  evaluation and instruction in written Home Exercise Program including 0# Supination  / Pronation x 3 second holds; 0# Dorsal Flexion, Volar Flexion, Radial Deviation, and Ulnar Deviation x 10 repetitions x 3 x daily.  RED Theraputty for rolling, 3 Point Pinch pinch and composite  x 20 repetitions x 1 x daily .    Patient. Educated on modalities for home pain management, activity modifications and precautions, verbal education on Distal to proximal edema massage for edema management.   Patient. Demo understanding of above.     Patient/Family Education: role of Occupational Therapy, goals for Occupational Therapy, scheduling/cancellations - patient verbalized understanding. Discussed insurance limitations with patient.        Assessment:     Problem List :       Decreased Range of motion,  Decreased  and pinch strength,   Decreased functional abilities,  Increased pain,   Edema      During the evaluation, the patient required Minimal modification or assistance.  The following co-morbid conditions/personal factors may affect the plan of care: previous surgeries to (Right) hand ( per patient) .       Patricia James is a 33 y.o. female referred to  outpatient occupational therapy and presents with a medical diagnosis of M65.841 (ICD-10-CM) - Stenosing tenosynovitis of finger of right hand, resulting in limited range of motion, strength, functional abilities, increased pain and inflammation and demonstrates limitations as described in the chart above. Following medical record review it is determined that patient will benefit from occupational therapy services in order to maximize pain free and/or functional use of right hand. The following goals were discussed with the patient and patient is in agreement with them as to be addressed in the treatment plan. The patient's rehab potential is Good.     Anticipated Barriers to Therapy: None     The following goals were discussed with the patient and patient is in agreement with them as to be addressed in the treatment plan.     Goals:   Goals:  1)   Patient to be Independent with Home exercise program and modalities for pain management by 1 week.   2)   Patient will report   7/10 pain on average with activity to assist with exercises by 6-8 weeks.  3)   Patient will increase range of Motion by 3-5 degrees to increase functional use for activities of daily living by 6-8 weeks.  4)   Patient will increase  strength 3-5 pounds to open containers by 6-8 weeks.  5)   Patient will increase pinch by 1-3 pounds for buttoning by 6-8 weeks.  6)   Patient will decrease edema by 0.1-0.3 millimeters  to increase joint mobility /flexibility by 6-8 weeks.         Plan:   Patient to be treated by Occupational Therapy    2    times per week for     90 days   during the certification period from   07/19/2023  to 10/19/2023     to achieve the established goals.  Treatment to include :  paraffin, fluidotherapy, manual therapy/joint mobilizations, kinesiotaping, dry needling performed by certified physical therapist,   modalities for pain management, Ultrasound 1.0 /3.0mhz, therapeutic exercises/activities,        strengthening,     as well as any other treatments deemed necessary based on the patient's needs or progress.     Will reassess as needed and adjust treatment plan accordingly.              I certify the need for these services furnished under this plan of treatment and while under my care    ____________________________________                         __________________  Physician/Referring Practitioner                                               Date of Signature    Aleah Eastman, OT

## 2023-07-19 NOTE — PROGRESS NOTES
"                                  Occupational Therapy Daily Treatment Note       Date: 7/24/2023  Name: Patricia James  Clinic Number: [id number]    Therapy Diagnosis: M65.841 (ICD-10-CM) - Stenosing tenosynovitis of finger of right hand  Encounter Diagnosis   Name Primary?    Right hand pain Yes     Physician: Dr. Omega Fontanez    Physician Orders: M65.841 (ICD-10-CM) - Stenosing tenosynovitis of finger of right hand; evaluate and treat;  Paraffin, Ultrasound, slow progressive strengthening, progressive resistive exercises, range of motion, deep heat, massage, dry needling   Medical Diagnosis: M65.841 (ICD-10-CM) - Stenosing tenosynovitis of finger of right hand; (Right) hand pain  Surgical Procedure and Date: Not Applicable     Insurance Authorization Period Expiration: 07/19/2023-09/24/2023  Plan of Care Certification Period: 07/19/2023-10/19/2023  Date of Return to MD: approx. 08/16    Evaluation FOTO: 07/19/2023 = 52%    Visit # / Visits authorized: 1 / 12  Time In: 1:40  Time Out: 2:20  Total Billable Time: 40  minutes    Precautions:  Standard            Subjective     Patient reports: "I am in a lot of pain today.  The putty is manageable.  The squeezing exercise hurts.  I do not know what I did but I am in pain. "     Pain: 8/10   Location of pain: (Right) wrist / forearm / hand     Objective    Patient seen by Occupational Therapy this session. Tx consisted of:          Therapeutic Activities to improve functional performance while increasing strength, endurance, range of motion,  and flexibility  x   40  minutes:    -Fluidotherapy to  (Right)   hand to increase blood flow, circulation, tissue elasticity, desensitization, sensory re-education and for pain management  x 10 minutes.      -Manual Therapy techniques to  (Right) wrist  including joint mobilizations, stretching, and Passive Range of Motion to increase joint mobility, range of motion  and for pain management  x  4 minutes   -Soft Tissue " "Mobilization to  (Right) hand into wrist and forearm  from distal to proximal to decrease edema and increase range of motion and functional abilities  x  2  minutes  - Applied Biofreeze to dorsal distal forearm and ulnar aspect of (Right) wrist ( post session)       - Velcro checkerboard (on wedge) with "dart throwing" pattern x 1 repetition  - 0# Dorsiflexion/Volar flexion (Supination /Pronation ), Radial Deviation / Ulnar Deviation x 10 repetitions  - Wrist roller coaster for Active Range of Motion  of wrist in all planes x 5 repetitions    - Weighted ball on tabletop for Dorsiflexion /Volar Flexion  stretches x 20 repetitions   - Wrist wheel for Supination / Pronation stretch x 10 repetitions   - Active Assistive Range of Motion for (Right) wrist in all planes x 10 repetitions   - High lighter rolls for intrinsics x 10 repetitions   - Sumner Slot with (10) LARGE  washers and (10) pennies ( 5 in hand) for Fine Motor Coordination x 1 repetitions  - YELLOW digiflex for isolated x 10 repetitions;  RED digiflex for composite digital flexion x 20 repetitions         Initiate in future therapy sessions:        - Mini colored pegboard for Fine Motor Coordination and in-hand manipulation  (5 in-hand) x 20 pegs  -  ###Progressive Hand Gripper (black spring) for composite  x 20 repetitions   - ### Theraputty for rolling, pulling, PVC for "cookie cutting" and medicine top x 10 Repetitions    - Wooden pegboard with ### clothespins with 3PT pinch x 2 repetitions   - YELLOW digiweb for composite digital Extension and  intrinsics x 20 repetitions     -  #### dowel for Supination /Pronation  x 20 repetitions   - Low load prolonged stretches for Dorsiflexion /Volar Flexion with ### leg weight ( on wedge) x 30 seconds each x 2 repetitions   - ### Flexbar for Supination /Pronation  and Dorsiflexion /Volar Flexion x 20 repetitions    - ### Wrist exerstick in standing for Dorsiflexion / Volar Flexion  x 3 repetitions           " "    Assessment     Patient will continue to benefit from skilled Occupational Therapy intervention to increase functional abilities, range of motion, and strength and pain control.  Patient. demonstrated proper understanding of each exercise.  Patient continues to require verbal and tactile cues for throughout therapy session to maintain position and prevent compensation.   Patient continues to be limited in functional and leisurely pursuits. Pain limits patient's participation in activities of daily living.  Patient is not able to carryout necessary vocational tasks.   Patient's spiritual, cultural and educational needs considered and patient agreeable to plan of care and goals.  Patient is making good progress towards established goals.  Patient tolerated exercises / activities within pain tolerance.   Reviewed Home Exercise Program with patient., see EPIC under "patient instructions" for provided exercises, activity modifications and limitations, modalities for home pain management.  Patient. demo understanding of above.    Patient. tolerated Fluidotherapy  with no irritation.     Patient reported tolerable stretching pain during ball on table top for Dorsal Flexion stretch.      New/Revised Goals: Continue Plan Of Care   Goals:  1)   Patient to be Independent with Home exercise program and modalities for pain management by 1 week.  (MET)   2)   Patient will report   7/10 pain on average with activity to assist with exercises by 6-8 weeks.  ( In Progress)   3)   Patient will increase range of Motion by 3-5 degrees to increase functional use for activities of daily living by 6-8 weeks.  ( In Progress)   4)   Patient will increase  strength 3-5 pounds to open containers by 6-8 weeks.  ( In Progress)   5)   Patient will increase pinch by 1-3 pounds for buttoning by 6-8 weeks.  ( In Progress)   6)   Patient will decrease edema by 0.1-0.3 millimeters  to increase joint mobility /flexibility by 6-8 weeks. (In " Progress)     Plan      Continue 2x week during the 90 day certification period  07/19/2023  to 10/192/2023   with established Plan Of Care in pursuit of Occupational Therapy goals.      Aleah Eastman, OT

## 2023-07-24 ENCOUNTER — CLINICAL SUPPORT (OUTPATIENT)
Dept: REHABILITATION | Facility: HOSPITAL | Age: 33
End: 2023-07-24
Payer: MEDICAID

## 2023-07-24 DIAGNOSIS — M79.641 RIGHT HAND PAIN: Primary | ICD-10-CM

## 2023-07-24 PROCEDURE — 97530 THERAPEUTIC ACTIVITIES: CPT | Mod: PN

## 2023-07-24 NOTE — PROGRESS NOTES
"                                  Occupational Therapy Daily Treatment Note       Date: 7/26/2023  Name: Patricia James  Clinic Number: [id number]    Therapy Diagnosis: M65.841 (ICD-10-CM) - Stenosing tenosynovitis of finger of right hand  Encounter Diagnosis   Name Primary?    Right hand pain Yes     Physician: Dr. Omega Fontanez    Physician Orders: M65.841 (ICD-10-CM) - Stenosing tenosynovitis of finger of right hand; evaluate and treat;  Paraffin, Ultrasound, slow progressive strengthening, progressive resistive exercises, range of motion, deep heat, massage, dry needling   Medical Diagnosis: M65.841 (ICD-10-CM) - Stenosing tenosynovitis of finger of right hand; (Right) hand pain  Surgical Procedure and Date: Not Applicable     Insurance Authorization Period Expiration: 07/19/2023-09/24/2023  Plan of Care Certification Period: 07/19/2023-10/19/2023  Date of Return to MD: approx. 08/16    Evaluation FOTO: 07/19/2023 = 52%    Visit # / Visits authorized: 2 / 12  Time In: 11:00  Time Out: 11:40   Total Billable Time: 40  minutes    Precautions:  Standard            Subjective     Patient reports: " I am feeling better today in my arm."     Pain: 6/10   Location of pain: (Right) wrist / forearm / hand     Objective    Patient seen by Occupational Therapy this session. Tx consisted of:          Therapeutic Activities to improve functional performance while increasing strength, endurance, range of motion,  and flexibility  x   40  minutes:    -Fluidotherapy to  (Right)   hand to increase blood flow, circulation, tissue elasticity, desensitization, sensory re-education and for pain management  x 10 minutes.      -Manual Therapy techniques to  (Right) wrist  including joint mobilizations, stretching, and Passive Range of Motion to increase joint mobility, range of motion  and for pain management  x  4 minutes   -Soft Tissue Mobilization to  (Right) hand into wrist and forearm  from distal to proximal to decrease edema " "and increase range of motion and functional abilities  x  2  minutes  - Applied Biofreeze to dorsal distal forearm and ulnar aspect of (Right) wrist ( post session)       - Velcro checkerboard (on wedge) with "dart throwing" pattern x 1 repetition  - 0.5# Dorsiflexion/Volar flexion (Supination /Pronation ), Radial Deviation / Ulnar Deviation x 10 repetitions  - Wrist roller coaster for Active Range of Motion  of wrist in all planes x 10 repetitions    - Weighted ball on tabletop for Dorsiflexion /Volar Flexion  stretches x 20 repetitions    - Soccer ball (elbows flexed 90*) for Supination / Pronation stretch x 10 repetitions   - Wrist wheel for Supination / Pronation stretch x 10 repetitions   - Active Assistive Range of Motion for (Right) wrist in all planes x 5 repetitions   - High lighter rolls for intrinsics x 10 repetitions   - [place] Slot with (10) LARGE  washers and (10) pennies ( 5 in hand) for Fine Motor Coordination x 1 repetitions  - YELLOW digiflex for isolated x 10 repetitions;  RED digiflex for composite digital flexion x 20 repetitions   - RED Theraputty with PVC for "cookie cutting" and medicine top simulating opening containers x 15 repetitions         Initiate in future therapy sessions:        - Mini colored pegboard for Fine Motor Coordination and in-hand manipulation  (5 in-hand) x 20 pegs  -  ###Progressive Hand Gripper (black spring) for composite  x 20 repetitions   - ### Theraputty for rolling, pulling x 10 Repetitions    - Wooden pegboard with ### clothespins with 3PT pinch x 2 repetitions   - YELLOW digiweb for composite digital Extension and  intrinsics x 20 repetitions     -  #### dowel for Supination /Pronation  x 20 repetitions   - Low load prolonged stretches for Dorsiflexion /Volar Flexion with ### leg weight ( on wedge) x 30 seconds each x 2 repetitions   - ### Flexbar for Supination /Pronation  and Dorsiflexion /Volar Flexion x 20 repetitions    - ### Wrist exerstick in standing " "for Dorsiflexion / Volar Flexion  x 3 repetitions               Assessment     Patient will continue to benefit from skilled Occupational Therapy intervention to increase functional abilities, range of motion, and strength and pain control.  Patient. demonstrated proper understanding of each exercise.  Patient continues to require verbal and tactile cues for throughout therapy session to maintain position and prevent compensation.   Patient continues to be limited in functional and leisurely pursuits. Pain limits patient's participation in activities of daily living.  Patient is not able to carryout necessary vocational tasks.   Patient's spiritual, cultural and educational needs considered and patient agreeable to plan of care and goals.  Patient is making good progress towards established goals.  Patient tolerated exercises / activities within pain tolerance.   Reviewed Home Exercise Program with patient., see EPIC under "patient instructions" for provided exercises, activity modifications and limitations, modalities for home pain management.  Patient. demo understanding of above.    Patient. tolerated Fluidotherapy  with no irritation.     Patient tolerated addition of Theraputty with PVC for "cookie cutting" and medicine top with no reported pain.   Patient reported limited strength rotating medicine top medially and PVC laterally.  Patient tolerated addition of soccer ball for Supination / Pronation with tolerable stretching pain reported. Patient tolerated increase in resistance during Dorsal Flexion, Volar Flexion, Radial Deviation< and Ulnar Deviation with no reported pain but demo fatigue.      New/Revised Goals: Continue Plan Of Care   Goals:  1)   Patient to be Independent with Home exercise program and modalities for pain management by 1 week.  (MET)   2)   Patient will report   7/10 pain on average with activity to assist with exercises by 6-8 weeks.  ( In Progress)   3)   Patient will increase range of " Motion by 3-5 degrees to increase functional use for activities of daily living by 6-8 weeks.  ( In Progress)   4)   Patient will increase  strength 3-5 pounds to open containers by 6-8 weeks.  ( In Progress)   5)   Patient will increase pinch by 1-3 pounds for buttoning by 6-8 weeks.  ( In Progress)   6)   Patient will decrease edema by 0.1-0.3 millimeters  to increase joint mobility /flexibility by 6-8 weeks. (In Progress)     Plan      Continue 2x week during the 90 day certification period  07/19/2023  to 10/192/2023   with established Plan Of Care in pursuit of Occupational Therapy goals.      Aleah Eastman, OT

## 2023-07-25 ENCOUNTER — OFFICE VISIT (OUTPATIENT)
Dept: URGENT CARE | Facility: CLINIC | Age: 33
End: 2023-07-25
Payer: MEDICAID

## 2023-07-25 VITALS
DIASTOLIC BLOOD PRESSURE: 86 MMHG | BODY MASS INDEX: 40.57 KG/M2 | HEART RATE: 80 BPM | RESPIRATION RATE: 18 BRPM | TEMPERATURE: 97 F | WEIGHT: 229 LBS | OXYGEN SATURATION: 99 % | HEIGHT: 63 IN | SYSTOLIC BLOOD PRESSURE: 130 MMHG

## 2023-07-25 DIAGNOSIS — J02.9 SORE THROAT: ICD-10-CM

## 2023-07-25 DIAGNOSIS — J02.9 PHARYNGITIS, UNSPECIFIED ETIOLOGY: Primary | ICD-10-CM

## 2023-07-25 PROCEDURE — 99203 OFFICE O/P NEW LOW 30 MIN: CPT | Mod: S$GLB,,,

## 2023-07-25 PROCEDURE — 99203 PR OFFICE/OUTPT VISIT, NEW, LEVL III, 30-44 MIN: ICD-10-PCS | Mod: S$GLB,,,

## 2023-07-25 RX ORDER — FLUTICASONE PROPIONATE 50 MCG
1 SPRAY, SUSPENSION (ML) NASAL DAILY
Qty: 15.8 ML | Refills: 0 | Status: SHIPPED | OUTPATIENT
Start: 2023-07-25 | End: 2024-02-05

## 2023-07-25 RX ORDER — CETIRIZINE HYDROCHLORIDE 10 MG/1
10 TABLET ORAL DAILY
Qty: 30 TABLET | Refills: 0 | Status: SHIPPED | OUTPATIENT
Start: 2023-07-25

## 2023-07-25 NOTE — PROGRESS NOTES
"Subjective:      Patient ID: Patricia James is a 33 y.o. female.    Vitals:  height is 5' 3" (1.6 m) and weight is 103.9 kg (229 lb). Her oral temperature is 97.1 °F (36.2 °C). Her blood pressure is 130/86 and her pulse is 80. Her respiration is 18 and oxygen saturation is 99%.     Chief Complaint: Sore Throat    Sore Throat   This is a new problem. The current episode started today. She has tried nothing for the symptoms.     HENT:  Positive for sore throat.     Objective:     Physical Exam    Assessment:     No diagnosis found.    Plan:       There are no diagnoses linked to this encounter.                "

## 2023-07-25 NOTE — PATIENT INSTRUCTIONS
Symptomatic treatment to include:     Rest, increase fluid intake to include electrolyte replacement  Ibuprofen/Tylenol as directed for fever, sore throat, body aches  Zrytec and flonase for sinus symptoms  Mucinex D over the counter as directed for sinus congestion.   Warm, salt water gargles, over the counter throat lozenges or sprays as desires.   ER for difficulty breathing not relieved by rest, excessive lethargy and/or change in mental status

## 2023-07-25 NOTE — PROGRESS NOTES
"Subjective:      Patient ID: Patricia James is a 33 y.o. female.    Vitals:  height is 5' 3" (1.6 m) and weight is 103.9 kg (229 lb). Her oral temperature is 97.1 °F (36.2 °C). Her blood pressure is 130/86 and her pulse is 80. Her respiration is 18 and oxygen saturation is 99%.     Chief Complaint: Sore Throat    Patient complains of a sore throat that started this morning.  Denies nasal congestion, postnasal drip, cough, fever, or chills.  Strep was negative.     Sore Throat   Pertinent negatives include no congestion or ear pain.     Constitution: Negative for chills, fatigue and fever.   HENT:  Positive for sore throat. Negative for ear pain, congestion, postnasal drip, sinus pain and sinus pressure.    Neck: neck negative.   Cardiovascular: Negative.  Negative for chest pain and sob on exertion.   Eyes: Negative.    Respiratory: Negative.     Gastrointestinal: Negative.    Endocrine: negative.   Genitourinary: Negative.    Musculoskeletal: Negative.    Skin: Negative.    Allergic/Immunologic: Negative.    Neurological: Negative.    Hematologic/Lymphatic: Negative.    Psychiatric/Behavioral: Negative.      Objective:     Physical Exam   Constitutional: She does not appear ill. No distress. normal  HENT:   Head: Normocephalic and atraumatic.   Ears:   Right Ear: Tympanic membrane, external ear and ear canal normal.   Left Ear: Tympanic membrane, external ear and ear canal normal.   Nose: Nose normal. No congestion.   Mouth/Throat: Mucous membranes are moist. Posterior oropharyngeal erythema present. No oropharyngeal exudate.   Eyes: Pupils are equal, round, and reactive to light.   Neck: Neck supple. No neck rigidity present.   Cardiovascular: Normal rate.   Pulmonary/Chest: Effort normal. No respiratory distress.   Abdominal: Normal appearance. Soft. There is no abdominal tenderness.   Musculoskeletal: Normal range of motion.         General: No swelling or tenderness. Normal range of motion.      " Cervical back: She exhibits no tenderness.   Neurological: no focal deficit. She is alert. She displays no weakness.   Skin: Skin is warm and dry. Capillary refill takes less than 2 seconds.   Psychiatric: Her behavior is normal. Mood, judgment and thought content normal.   Nursing note and vitals reviewed.    Assessment:     1. Pharyngitis, unspecified etiology    2. Sore throat        Plan:       Pharyngitis, unspecified etiology  -     cetirizine (ZYRTEC) 10 MG tablet; Take 1 tablet (10 mg total) by mouth once daily.  Dispense: 30 tablet; Refill: 0  -     fluticasone propionate (FLONASE) 50 mcg/actuation nasal spray; 1 spray (50 mcg total) by Each Nostril route once daily.  Dispense: 15.8 mL; Refill: 0    Sore throat  -     cetirizine (ZYRTEC) 10 MG tablet; Take 1 tablet (10 mg total) by mouth once daily.  Dispense: 30 tablet; Refill: 0  -     fluticasone propionate (FLONASE) 50 mcg/actuation nasal spray; 1 spray (50 mcg total) by Each Nostril route once daily.  Dispense: 15.8 mL; Refill: 0

## 2023-07-26 ENCOUNTER — CLINICAL SUPPORT (OUTPATIENT)
Dept: REHABILITATION | Facility: HOSPITAL | Age: 33
End: 2023-07-26
Payer: MEDICAID

## 2023-07-26 DIAGNOSIS — M79.641 RIGHT HAND PAIN: Primary | ICD-10-CM

## 2023-07-26 PROCEDURE — 97530 THERAPEUTIC ACTIVITIES: CPT | Mod: PN

## 2023-08-03 ENCOUNTER — OCCUPATIONAL HEALTH (OUTPATIENT)
Dept: URGENT CARE | Facility: CLINIC | Age: 33
End: 2023-08-03
Payer: MEDICAID

## 2023-08-03 DIAGNOSIS — Z00.00 ROUTINE GENERAL MEDICAL EXAMINATION AT A HEALTH CARE FACILITY: Primary | ICD-10-CM

## 2023-08-04 ENCOUNTER — CLINICAL SUPPORT (OUTPATIENT)
Dept: REHABILITATION | Facility: HOSPITAL | Age: 33
End: 2023-08-04
Payer: MEDICAID

## 2023-08-04 DIAGNOSIS — M79.641 RIGHT HAND PAIN: Primary | ICD-10-CM

## 2023-08-04 LAB
ALBUMIN SERPL-MCNC: 4.6 G/DL (ref 3.9–4.9)
ALBUMIN/GLOB SERPL: 2.4 {RATIO} (ref 1.2–2.2)
ALP SERPL-CCNC: 70 IU/L (ref 44–121)
ALT SERPL-CCNC: 29 IU/L (ref 0–32)
APPEARANCE UR: CLEAR
AST SERPL-CCNC: 14 IU/L (ref 0–40)
BASOPHILS # BLD AUTO: 0.1 X10E3/UL (ref 0–0.2)
BASOPHILS NFR BLD AUTO: 1 %
BILIRUB SERPL-MCNC: 0.6 MG/DL (ref 0–1.2)
BILIRUB UR QL STRIP: NEGATIVE
BUN SERPL-MCNC: 8 MG/DL (ref 6–20)
BUN/CREAT SERPL: 14 (ref 9–23)
CALCIUM SERPL-MCNC: 9.5 MG/DL (ref 8.7–10.2)
CHLORIDE SERPL-SCNC: 105 MMOL/L (ref 96–106)
CHOLEST SERPL-MCNC: 158 MG/DL (ref 100–199)
CO2 SERPL-SCNC: 21 MMOL/L (ref 20–29)
COLOR UR: YELLOW
CREAT SERPL-MCNC: 0.58 MG/DL (ref 0.57–1)
EOSINOPHIL # BLD AUTO: 0.2 X10E3/UL (ref 0–0.4)
EOSINOPHIL NFR BLD AUTO: 3 %
ERYTHROCYTE [DISTWIDTH] IN BLOOD BY AUTOMATED COUNT: 13.8 % (ref 11.7–15.4)
EST. GFR  (NO RACE VARIABLE): 121 ML/MIN/1.73
GGT SERPL-CCNC: 44 IU/L (ref 0–60)
GLOBULIN SER CALC-MCNC: 1.9 G/DL (ref 1.5–4.5)
GLUCOSE SERPL-MCNC: 123 MG/DL (ref 70–99)
GLUCOSE UR QL STRIP: NEGATIVE
HCT VFR BLD AUTO: 40.6 % (ref 34–46.6)
HDLC SERPL-MCNC: 35 MG/DL
HGB BLD-MCNC: 13.2 G/DL (ref 11.1–15.9)
HGB UR QL STRIP: NEGATIVE
HIV 1+2 AB+HIV1 P24 AG SERPL QL IA: NON REACTIVE
IMM GRANULOCYTES # BLD AUTO: 0 X10E3/UL (ref 0–0.1)
IMM GRANULOCYTES NFR BLD AUTO: 1 %
KETONES UR QL STRIP: NEGATIVE
LDLC SERPL CALC-MCNC: 101 MG/DL (ref 0–99)
LEUKOCYTE ESTERASE UR QL STRIP: NEGATIVE
LYMPHOCYTES # BLD AUTO: 1.7 X10E3/UL (ref 0.7–3.1)
LYMPHOCYTES NFR BLD AUTO: 30 %
MCH RBC QN AUTO: 27.5 PG (ref 26.6–33)
MCHC RBC AUTO-ENTMCNC: 32.5 G/DL (ref 31.5–35.7)
MCV RBC AUTO: 85 FL (ref 79–97)
MICRO URNS: NORMAL
MONOCYTES # BLD AUTO: 0.4 X10E3/UL (ref 0.1–0.9)
MONOCYTES NFR BLD AUTO: 7 %
NEUTROPHILS # BLD AUTO: 3.3 X10E3/UL (ref 1.4–7)
NEUTROPHILS NFR BLD AUTO: 58 %
NITRITE UR QL STRIP: NEGATIVE
PH UR STRIP: 6.5 [PH] (ref 5–7.5)
PLATELET # BLD AUTO: 326 X10E3/UL (ref 150–450)
POTASSIUM SERPL-SCNC: 4.6 MMOL/L (ref 3.5–5.2)
PROT SERPL-MCNC: 6.5 G/DL (ref 6–8.5)
PROT UR QL STRIP: NORMAL
RBC # BLD AUTO: 4.8 X10E6/UL (ref 3.77–5.28)
RPR SER QL: NON REACTIVE
SODIUM SERPL-SCNC: 141 MMOL/L (ref 134–144)
SP GR UR STRIP: 1.02 (ref 1–1.03)
TRIGL SERPL-MCNC: 119 MG/DL (ref 0–149)
UROBILINOGEN UR STRIP-MCNC: 0.2 MG/DL (ref 0.2–1)
VLDLC SERPL CALC-MCNC: 22 MG/DL (ref 5–40)
WBC # BLD AUTO: 5.6 X10E3/UL (ref 3.4–10.8)

## 2023-08-04 PROCEDURE — 97530 THERAPEUTIC ACTIVITIES: CPT | Mod: PN

## 2023-08-04 NOTE — PROGRESS NOTES
Occupational Therapy Daily Treatment Note       Date: 8/4/2023  Name: Patricia James  Clinic Number: [id number]    Therapy Diagnosis: M65.841 (ICD-10-CM) - Stenosing tenosynovitis of finger of right hand  No diagnosis found.    Physician: Dr. Omega Fontanez    Physician Orders: M65.841 (ICD-10-CM) - Stenosing tenosynovitis of finger of right hand; evaluate and treat;  Paraffin, Ultrasound, slow progressive strengthening, progressive resistive exercises, range of motion, deep heat, massage, dry needling   Medical Diagnosis: M65.841 (ICD-10-CM) - Stenosing tenosynovitis of finger of right hand; (Right) hand pain  Surgical Procedure and Date: Not Applicable     Insurance Authorization Period Expiration: 07/19/2023-09/24/2023  Plan of Care Certification Period: 07/19/2023-10/19/2023  Date of Return to MD: approx. 08/16    Evaluation FOTO: 07/19/2023 = 52%    Visit # / Visits authorized: 4/ 12  Time In: 10:15 am  Time Out: 11:00 am  Total Billable Time: 45  minutes    Precautions:  Standard            Subjective     Patient reports: she was compliant with her HEP    Pain: 6/10   Location of pain: (Right) wrist / forearm / hand     Objective    Patient seen by Occupational Therapy this session. Tx consisted of:          Therapeutic Activities to improve functional performance while increasing strength, endurance, range of motion,  and flexibility  x   45  minutes:    -Fluidotherapy to  (Right)   hand to increase blood flow, circulation, tissue elasticity, desensitization, sensory re-education and for pain management  x 10 minutes.      -Manual Therapy techniques to  (Right) wrist  including joint mobilizations, stretching, and Passive Range of Motion to increase joint mobility, range of motion  and for pain management  x  4 minutes   -Soft Tissue Mobilization to  (Right) hand into wrist and forearm  from distal to proximal to decrease edema and increase range of motion and functional  "abilities  x  2  minutes  - Applied Biofreeze to dorsal distal forearm and ulnar aspect of (Right) wrist ( post session)       - Velcro checkerboard (on wedge) with "dart throwing" pattern x 1 repetition  - 1# Dorsiflexion/Volar flexion (Supination /Pronation ), Radial Deviation / Ulnar Deviation x 10 repetitions  - Wrist roller coaster for Active Range of Motion  of wrist in all planes x 10 repetitions    - Weighted ball on tabletop for Dorsiflexion /Volar Flexion  stretches x 20 repetitions    - Soccer ball (elbows flexed 90*) for Supination / Pronation stretch x 10 repetitions   - Wrist wheel for Supination / Pronation stretch x 10 repetitions   - Active Assistive Range of Motion for (Right) wrist in all planes x 5 repetitions   - High lighter rolls for intrinsics x 10 repetitions   - [place] Slot with (10) LARGE  washers and (10) pennies ( 5 in hand) for Fine Motor Coordination x 1 repetitions  - YELLOW digiflex for isolated x 10 repetitions;  RED digiflex for composite digital flexion x 20 repetitions   - RED Theraputty with PVC for "cookie cutting" and medicine top simulating opening containers x 15 repetitions         Initiate in future therapy sessions:        - Mini colored pegboard for Fine Motor Coordination and in-hand manipulation  (5 in-hand) x 20 pegs  -  ###Progressive Hand Gripper (black spring) for composite  x 20 repetitions   - ### Theraputty for rolling, pulling x 10 Repetitions    - Wooden pegboard with ### clothespins with 3PT pinch x 2 repetitions   - YELLOW digiweb for composite digital Extension and  intrinsics x 20 repetitions     -  #### dowel for Supination /Pronation  x 20 repetitions   - Low load prolonged stretches for Dorsiflexion /Volar Flexion with ### leg weight ( on wedge) x 30 seconds each x 2 repetitions   - ### Flexbar for Supination /Pronation  and Dorsiflexion /Volar Flexion x 20 repetitions    - ### Wrist exerstick in standing for Dorsiflexion / Volar Flexion  x 3 " "repetitions               Assessment     Patient will continue to benefit from skilled Occupational Therapy intervention to increase functional abilities, range of motion, and strength and pain control.  Patient. demonstrated proper understanding of each exercise.  Patient continues to require verbal and tactile cues for throughout therapy session to maintain position and prevent compensation. Patient tolerated all activities without an increase in her pain complaints. Patient continues to be limited in functional and leisurely pursuits. Pain limits patient's participation in activities of daily living.  Patient is not able to carryout necessary vocational tasks.   Patient's spiritual, cultural and educational needs considered and patient agreeable to plan of care and goals.  Patient is making good progress towards established goals.  Patient tolerated exercises / activities within pain tolerance.   Reviewed Home Exercise Program with patient., see EPIC under "patient instructions" for provided exercises, activity modifications and limitations, modalities for home pain management.  Patient. demo understanding of above.    Patient. tolerated Fluidotherapy  with no irritation.     Patient tolerated addition of Theraputty with PVC for "cookie cutting" and medicine top with no reported pain.   Patient reported limited strength rotating medicine top medially and PVC laterally.  Patient tolerated addition of soccer ball for Supination / Pronation with tolerable stretching pain reported. Patient tolerated increase in resistance during Dorsal Flexion, Volar Flexion, Radial Deviation< and Ulnar Deviation with no reported pain but demo fatigue.      New/Revised Goals: Continue Plan Of Care   Goals:  1)   Patient to be Independent with Home exercise program and modalities for pain management by 1 week.  (MET)   2)   Patient will report   7/10 pain on average with activity to assist with exercises by 6-8 weeks.  ( In Progress) "   3)   Patient will increase range of Motion by 3-5 degrees to increase functional use for activities of daily living by 6-8 weeks.  ( In Progress)   4)   Patient will increase  strength 3-5 pounds to open containers by 6-8 weeks.  ( In Progress)   5)   Patient will increase pinch by 1-3 pounds for buttoning by 6-8 weeks.  ( In Progress)   6)   Patient will decrease edema by 0.1-0.3 millimeters  to increase joint mobility /flexibility by 6-8 weeks. (In Progress)     Plan      Continue 2x week during the 90 day certification period  07/19/2023  to 10/19/2023   with established Plan Of Care in pursuit of Occupational Therapy goals.      LAVELL White, CHT

## 2023-08-06 NOTE — PROGRESS NOTES
"                                  Occupational Therapy Daily Treatment Note       Date: 8/7/2023  Name: Patricia James  Clinic Number: [id number]    Therapy Diagnosis: M65.841 (ICD-10-CM) - Stenosing tenosynovitis of finger of right hand  Encounter Diagnosis   Name Primary?    Right hand pain Yes     Physician: Dr. Omega Fontanez    Physician Orders: M65.841 (ICD-10-CM) - Stenosing tenosynovitis of finger of right hand; evaluate and treat;  Paraffin, Ultrasound, slow progressive strengthening, progressive resistive exercises, range of motion, deep heat, massage, dry needling   Medical Diagnosis: M65.841 (ICD-10-CM) - Stenosing tenosynovitis of finger of right hand; (Right) hand pain  Surgical Procedure and Date: Not Applicable     Insurance Authorization Period Expiration: 07/19/2023-09/24/2023  Plan of Care Certification Period: 07/19/2023-10/19/2023  Date of Return to MD: approx. 08/16    Evaluation FOTO: 07/19/2023 = 52%    Visit # / Visits authorized: 4 / 12  Time In: 10:15  Time Out: 11:00  Total Billable Time: 40  minutes    Precautions:  Standard            Subjective     Patient reports: " I have some tightness on top of my forearm ( pointed to distal dorsal aspect of forearm) and pain on the bottom ( volar aspect) of wrist and side of wrist (medial aspect).     Pain: 8/10   Location of pain: (Right) wrist / forearm / hand     Objective    Patient seen by Occupational Therapy this session. Tx consisted of:          Therapeutic Activities to improve functional performance while increasing strength, endurance, range of motion,  and flexibility  x   40  minutes:    -Fluidotherapy to  (Right)   hand to increase blood flow, circulation, tissue elasticity, desensitization, sensory re-education and for pain management  x 10 minutes.      -Manual Therapy techniques to  (Right) wrist  including joint mobilizations, stretching, and Passive Range of Motion to increase joint mobility, range of motion  and for pain " "management  x  2 minutes   -Soft Tissue Mobilization to  (Right) hand into wrist and forearm  from distal to proximal to decrease edema and increase range of motion and functional abilities  x  2  minutes      - Velcro checkerboard (on wedge) with "dart throwing" pattern x 1 repetition  -NP  - 0.5# Dorsiflexion/Volar flexion (Supination /Pronation ), Radial Deviation / Ulnar Deviation x 10 repetitions  - Wrist roller coaster for Active Range of Motion  of wrist in all planes x 10 repetitions    - Weighted ball on tabletop for Dorsiflexion /Volar Flexion  stretches x 20 repetitions    - Soccer ball (elbows flexed 90*) for Supination / Pronation stretch x 10 repetitions   - Wrist wheel for Supination / Pronation stretch x 10 repetitions   - Active Assistive Range of Motion for (Right) wrist in all planes x 5 repetitions  -NP   - High lighter rolls for intrinsics x 10 repetitions -NP  - Mini colored pegboard for Fine Motor Coordination and in-hand manipulation  (5 in-hand) x 20 pegs    - YELLOW digiflex for isolated x 10 repetitions;  RED digiflex for composite digital flexion x 20 repetitions   - RED Theraputty with PVC for "cookie cutting" and medicine top simulating opening containers x 15 repetitions       -Ultrasound applied to  aspect of (Right) wrist / forearm  on  3.0 Mhz with 0.7 w/cm2 @ 50 % duty cycle (Biofreeze)  to decrease pain and inflammation and increase tissue elasticity x 8 minutes.      -NP = Not Performed         Initiate in future therapy sessions:        -  ###Progressive Hand Gripper (black spring) for composite  x 20 repetitions   - ### Theraputty for pulling x 10 Repetitions    - Wooden pegboard with ### clothespins with 3PT pinch x 2 repetitions   - YELLOW digiweb for composite digital Extension and  intrinsics x 20 repetitions     -  #### dowel for Supination /Pronation  x 20 repetitions   - Low load prolonged stretches for Dorsiflexion /Volar Flexion with ### leg weight ( on wedge) x 30 " "seconds each x 2 repetitions   - ### Flexbar for Supination /Pronation  and Dorsiflexion /Volar Flexion x 20 repetitions    - ### Wrist exerstick in standing for Dorsiflexion / Volar Flexion  x 3 repetitions               Assessment     Patient will continue to benefit from skilled Occupational Therapy intervention to increase functional abilities, range of motion, and strength and pain control.  Patient. demonstrated proper understanding of each exercise.  Patient continues to require verbal and tactile cues for throughout therapy session to maintain position and prevent compensation.   Patient continues to be limited in functional and leisurely pursuits. Pain limits patient's participation in activities of daily living.  Patient is not able to carryout necessary vocational tasks.   Patient's spiritual, cultural and educational needs considered and patient agreeable to plan of care and goals.  Patient is making good progress towards established goals.  Patient tolerated exercises / activities within pain tolerance.   Reviewed Home Exercise Program with patient., see EPIC under "patient instructions" for provided exercises, activity modifications and limitations, modalities for home pain management.  Patient. demo understanding of above.    Patient. tolerated Fluidotherapy & Ultrasound  with no irritation.     Patient tolerated addition of mini colored pegboard for in-hand manipulation and Fine Motor Coordination with no reported pain but demo limited coordination.      New/Revised Goals: Continue Plan Of Care   Goals:  1)   Patient to be Independent with Home exercise program and modalities for pain management by 1 week.  (MET)   2)   Patient will report   7/10 pain on average with activity to assist with exercises by 6-8 weeks.  ( In Progress)   3)   Patient will increase range of Motion by 3-5 degrees to increase functional use for activities of daily living by 6-8 weeks.  ( In Progress)   4)   Patient will " increase  strength 3-5 pounds to open containers by 6-8 weeks.  ( In Progress)   5)   Patient will increase pinch by 1-3 pounds for buttoning by 6-8 weeks.  ( In Progress)   6)   Patient will decrease edema by 0.1-0.3 millimeters  to increase joint mobility /flexibility by 6-8 weeks. (In Progress)     Plan      Continue 2x week during the 90 day certification period  07/19/2023  to 10/192/2023   with established Plan Of Care in pursuit of Occupational Therapy goals.      Aleah Eastman, OT

## 2023-08-07 ENCOUNTER — CLINICAL SUPPORT (OUTPATIENT)
Dept: REHABILITATION | Facility: HOSPITAL | Age: 33
End: 2023-08-07
Payer: MEDICAID

## 2023-08-07 DIAGNOSIS — M79.641 RIGHT HAND PAIN: Primary | ICD-10-CM

## 2023-08-07 LAB
CYTOLOGIST CVX/VAG CYTO: NORMAL
CYTOLOGY CVX/VAG DOC CYTO: NORMAL
CYTOLOGY CVX/VAG DOC THIN PREP: NORMAL
Lab: NORMAL
OTHER STN SPEC: NORMAL
STAT OF ADQ CVX/VAG CYTO-IMP: NORMAL

## 2023-08-07 PROCEDURE — 97530 THERAPEUTIC ACTIVITIES: CPT | Mod: PN

## 2023-08-07 NOTE — PROGRESS NOTES
"                                  Occupational Therapy Daily Treatment Note       Date: 8/9/2023  Name: Patricia James  Clinic Number: [id number]    Therapy Diagnosis: M65.841 (ICD-10-CM) - Stenosing tenosynovitis of finger of right hand  Encounter Diagnosis   Name Primary?    Right hand pain Yes     Physician: Dr. Omega Fontanez    Physician Orders: M65.841 (ICD-10-CM) - Stenosing tenosynovitis of finger of right hand; evaluate and treat;  Paraffin, Ultrasound, slow progressive strengthening, progressive resistive exercises, range of motion, deep heat, massage, dry needling   Medical Diagnosis: M65.841 (ICD-10-CM) - Stenosing tenosynovitis of finger of right hand; (Right) hand pain  Surgical Procedure and Date: Not Applicable     Insurance Authorization Period Expiration: 07/19/2023-09/24/2023  Plan of Care Certification Period: 07/19/2023-10/19/2023  Date of Return to MD: 08/17/2023    Evaluation FOTO: 07/19/2023 = 52%    Visit # / Visits authorized: 5 / 12  Time In: 10:20  Time Out: 11:00  Total Billable Time: 40  minutes    Precautions:  Standard            Subjective     Patient reports: " I was in a lot of pain last night but better this morning.  I did not really notice any difference after the Ultrasound last time."    Pain: 6/10   Location of pain: (Right) wrist / forearm / hand     Objective    Patient seen by Occupational Therapy this session. Tx consisted of:          Therapeutic Activities to improve functional performance while increasing strength, endurance, range of motion,  and flexibility  x   40  minutes:    -Fluidotherapy to  (Right)   hand to increase blood flow, circulation, tissue elasticity, desensitization, sensory re-education and for pain management  x 10 minutes.      -Manual Therapy techniques to  (Right) wrist  including joint mobilizations, stretching, and Passive Range of Motion to increase joint mobility, range of motion  and for pain management  x  2 minutes   -Soft Tissue " "Mobilization to  (Right) hand into wrist and forearm  from distal to proximal to decrease edema and increase range of motion and functional abilities  x  2  minutes      - Velcro checkerboard (on wedge) with "dart throwing" pattern x 1 repetition  -NP  - 0.5# Dorsiflexion/Volar flexion (Supination /Pronation ), Radial Deviation / Ulnar Deviation x 10 repetitions  - Wrist roller coaster for Active Range of Motion  of wrist in all planes x 10 repetitions    - Weighted ball on tabletop for Dorsiflexion /Volar Flexion  stretches x 20 repetitions    - Soccer ball (elbows flexed 90*) for Supination / Pronation stretch x 10 repetitions   - Wrist wheel for Supination / Pronation stretch x 10 repetitions   - Mini colored pegboard for Fine Motor Coordination and in-hand manipulation  (5 in-hand) x 20 pegs    - YELLOW digiflex for isolated x 10 repetitions;  RED digiflex for composite digital flexion x 20 repetitions   - RED Theraputty with PVC for "cookie cutting" and medicine top simulating opening containers x 15 repetitions   - 15# Progressive Hand Gripper (black spring) for composite  x 10 repetitions      -Ultrasound applied to  dorsal aspect of (Right) wrist / forearm  on  3.0 Mhz with 0.7 w/cm2 @ 50 % duty cycle (Biofreeze)  to decrease pain and inflammation and increase tissue elasticity x 8 minutes.        -NP = Not Performed         Initiate in future therapy sessions:       - ### Theraputty for pulling x 10 Repetitions    - Wooden pegboard with ### clothespins with 3PT pinch x 2 repetitions   - YELLOW digiweb for composite digital Extension and  intrinsics x 20 repetitions     -  #### dowel for Supination /Pronation  x 20 repetitions   - Low load prolonged stretches for Dorsiflexion /Volar Flexion with ### leg weight ( on wedge) x 30 seconds each x 2 repetitions   - ### Flexbar for Supination /Pronation  and Dorsiflexion /Volar Flexion x 20 repetitions    - ### Wrist exerstick in standing for Dorsiflexion / " "Volar Flexion  x 3 repetitions               Assessment     Patient will continue to benefit from skilled Occupational Therapy intervention to increase functional abilities, range of motion, and strength and pain control.  Patient. demonstrated proper understanding of each exercise.  Patient continues to require verbal and tactile cues for throughout therapy session to maintain position and prevent compensation.   Patient continues to be limited in functional and leisurely pursuits. Pain limits patient's participation in activities of daily living.  Patient is not able to carryout necessary vocational tasks.   Patient's spiritual, cultural and educational needs considered and patient agreeable to plan of care and goals.  Patient is making good progress towards established goals.  Patient tolerated exercises / activities within pain tolerance.   Reviewed Home Exercise Program with patient., see EPIC under "patient instructions" for provided exercises, activity modifications and limitations, modalities for home pain management.  Patient. demo understanding of above.    Patient. tolerated Fluidotherapy & Ultrasound  with no irritation.     Patient tolerated addition of Progressive Hand Gripper for composite  with no reported pain.     New/Revised Goals: Continue Plan Of Care   Goals:  1)   Patient to be Independent with Home exercise program and modalities for pain management by 1 week.  (MET)   2)   Patient will report   7/10 pain on average with activity to assist with exercises by 6-8 weeks.  ( In Progress)   3)   Patient will increase range of Motion by 3-5 degrees to increase functional use for activities of daily living by 6-8 weeks.  ( In Progress)   4)   Patient will increase  strength 3-5 pounds to open containers by 6-8 weeks.  ( In Progress)   5)   Patient will increase pinch by 1-3 pounds for buttoning by 6-8 weeks.  ( In Progress)   6)   Patient will decrease edema by 0.1-0.3 millimeters  to " increase joint mobility /flexibility by 6-8 weeks. (In Progress)     Plan      Continue 2x week during the 90 day certification period  07/19/2023  to 10/192/2023   with established Plan Of Care in pursuit of Occupational Therapy goals.      Aleah Eastman, OT

## 2023-08-09 ENCOUNTER — CLINICAL SUPPORT (OUTPATIENT)
Dept: REHABILITATION | Facility: HOSPITAL | Age: 33
End: 2023-08-09
Payer: MEDICAID

## 2023-08-09 DIAGNOSIS — M79.641 RIGHT HAND PAIN: Primary | ICD-10-CM

## 2023-08-09 PROCEDURE — 97530 THERAPEUTIC ACTIVITIES: CPT | Mod: PN

## 2023-08-09 NOTE — PROGRESS NOTES
"                                  Occupational Therapy Progress Note       Date: 8/16/2023  Name: Patricia James  Clinic Number: [id number]    Therapy Diagnosis: M65.841 (ICD-10-CM) - Stenosing tenosynovitis of finger of right hand  Encounter Diagnosis   Name Primary?    Right hand pain Yes     Physician: Dr. Omega Fontanez    Physician Orders: M65.841 (ICD-10-CM) - Stenosing tenosynovitis of finger of right hand; evaluate and treat;  Paraffin, Ultrasound, slow progressive strengthening, progressive resistive exercises, range of motion, deep heat, massage, dry needling   Medical Diagnosis: M65.841 (ICD-10-CM) - Stenosing tenosynovitis of finger of right hand; (Right) hand pain  Surgical Procedure and Date: Not Applicable     Insurance Authorization Period Expiration: 07/19/2023-09/24/2023  Plan of Care Certification Period: 07/19/2023-10/19/2023  Date of Return to MD: 08/17/2023    Evaluation FOTO: 07/19/2023 = 52%  Reassessment FOTO: 08/16/2023 = 43%    Visit # / Visits authorized: 6 / 12  Time In: 10:15  Time Out: 11:00    Total Billable Time: 40  minutes    Precautions:  Standard            Subjective     Patient reports: " I am very sore today.  I think my hand is about the same."    Pain: 8/10   Location of pain: (Right) wrist / forearm / hand     Objective    Patient seen by Occupational Therapy this session. Tx consisted of:          Therapeutic Activities to improve functional performance while increasing strength, endurance, range of motion,  and flexibility  x   40  minutes:    -Fluidotherapy to  (Right)   hand to increase blood flow, circulation, tissue elasticity, desensitization, sensory re-education and for pain management  x 10 minutes.      -Manual Therapy techniques to  (Right) wrist  including joint mobilizations, stretching, and Passive Range of Motion to increase joint mobility, range of motion  and for pain management  x  2 minutes   -Soft Tissue Mobilization to  (Right) hand into wrist and " "forearm  from distal to proximal to decrease edema and increase range of motion and functional abilities  x  2  minutes      - Velcro checkerboard (on wedge) with "dart throwing" pattern x 1 repetition  -NP  - 0.5# Dorsiflexion/Volar flexion (Supination /Pronation ), Radial Deviation / Ulnar Deviation x 10 repetitions  - Wrist roller coaster for Active Range of Motion  of wrist in all planes x 10 repetitions    - Weighted ball on tabletop for Dorsiflexion /Volar Flexion  stretches x 20 repetitions    - Soccer ball (elbows flexed 90*) for Supination / Pronation stretch x 10 repetitions   - Wrist wheel with YELLOW Theraband for Supination / Pronation  x 10 repetitions   - Mini colored pegboard for Fine Motor Coordination and in-hand manipulation  (5 in-hand) x 20 pegs    - YELLOW digiflex for isolated x 10 repetitions;  RED digiflex for composite digital flexion x 20 repetitions   - RED Theraputty with PVC for "cookie cutting" and medicine top simulating opening containers x 15 repetitions   - 25# Progressive Hand Gripper (black spring) for composite  x 10 repetitions      -Ultrasound applied to  dorsal aspect of (Right) wrist / forearm  on  3.0 Mhz with 0.7 w/cm2 @ 50 % duty cycle (Biofreeze)  to decrease pain and inflammation and increase tissue elasticity x 8 minutes.        -NP = Not Performed         Initiate in future therapy sessions:       - ### Theraputty for pulling x 10 Repetitions    - Wooden pegboard with ### clothespins with 3PT pinch x 2 repetitions   - YELLOW digiweb for composite digital Extension and  intrinsics x 20 repetitions     -  #### dowel for Supination /Pronation  x 20 repetitions   - Low load prolonged stretches for Dorsiflexion /Volar Flexion with ### leg weight ( on wedge) x 30 seconds each x 2 repetitions   - ### Flexbar for Supination /Pronation  and Dorsiflexion /Volar Flexion x 20 repetitions    - ### Wrist exerstick in standing for Dorsiflexion / Volar Flexion  x 3 repetitions " "     Patient reassessed as follows:    Edema:   centimeters (Right) / (Left)  Metacarpals:  21.0 (-0.5)  / 20.9  Proximal Wrist Crease:  17.4 (-0.1)  / 16.8  2" prox to Proximal Wrist Crease: 18.6 (-0.0)   /  18.1             Active Range of Motion: hand   Patient demo (Bilateral) composite digital flexion actively, and able to oppose (Bilateral) thumbs to base of 5th digit                                       Active Range of Motion: wrist                         (Right)   //  (Left)    Dorsal Flexion /Volar Flexion:  40 (+5)  / 45 (+15)   //  60/70  Radial Deviation /Ulnar Deviation:  13 (+2)  / 20 (+4)   //  15/35  Supination / Pronation:  75 (+15)  / 85 (+5)   //  90/85    Passive Range of Motion: wrist                         (Right)       Dorsal Flexion /Volar Flexion : 50 (+5)  / 55 (+10)   Radial Deviation /Ulnar Deviation:  16 (+2)  / 25 (+3)   Supination / Pronation:  80 (+10)  / 85                   Manual Muscle Test:  Wrist        (Right)    Dorsal Flexion:  3/5  (+1)   Volar Flexion:  3/5  (+1)   Radial Deviation: 3/5  (+1)   Ulnar Deviation: 3/5  (+1)        Strength: (AGUILAR Dynamometer in pounds),Position II  (Right):  20 (- 20)   (Left):  60    Pinch Strength: (Pinch Gauge in pounds)             (Right) /  (Left)  Lateral Pinch:  11 (-6)   / 16  3 Point Pinch:  10 (-3)   / 17  2 Point Pinch:  10 (+1)   / 9              Assessment     Patient will continue to benefit from skilled Occupational Therapy intervention to increase functional abilities, range of motion, and strength and pain control.  Patient. demonstrated proper understanding of each exercise.  Patient continues to require verbal and tactile cues for throughout therapy session to maintain position and prevent compensation.   Patient continues to be limited in functional and leisurely pursuits. Pain limits patient's participation in activities of daily living.  Patient is not able to carryout necessary vocational tasks.   Patient's " "spiritual, cultural and educational needs considered and patient agreeable to plan of care and goals.  Patient is making good progress towards established goals.  Patient tolerated exercises / activities within pain tolerance.   Reviewed Home Exercise Program with patient., see EPIC under "patient instructions" for provided exercises, activity modifications and limitations, modalities for home pain management.  Patient. demo understanding of above.    Patient. tolerated Fluidotherapy & Ultrasound  with no irritation.      Patient demo decreased edema in (Right) metacarpals, Proximal Wrist Crease, and remained the same for 2" prox. To Proximal Wrist Crease; Increased Active range of motion for (Right) wrist Dorsal Flexion, Volar Flexion, Radial Deviation, Ulnar Deviation, Supination, and Pronation   ; increased Passive range of motion for (Right) wrist Dorsal Flexion, Volar Flexion, Radial Deviation, Ulnar Deviation, Supination;  increased Manual Muscle Testing for (Right) wrist Dorsal Flexion,Volar Flexion, Radial Deviation, and Ulnar Deviation;  decreased  strength;  decreased Lateral Pinch, 3PT,  strength and increased 2 Point Pinch strength.     Patient tolerated increase in resistance and repetitions during Progressive Hand Gripper with no reported pain.        New/Revised Goals: Continue Plan Of Care   Goals:  1)   Patient to be Independent with Home exercise program and modalities for pain management by 1 week.  (MET)   2)   Patient will report   6/10 pain on average with activity to assist with exercises by 6-8 weeks.  (Revised 08/16/2023)   3)   Patient will increase range of Motion by 3-5 degrees to increase functional use for activities of daily living by 6-8 weeks.  ( In Progress)   4)   Patient will increase  strength 3-5 pounds to open containers by 6-8 weeks.  ( In Progress)   5)   Patient will increase pinch by 1-3 pounds for buttoning by 6-8 weeks.  ( In Progress)   6)   Patient will decrease " edema by 0.1-0.3 millimeters  to increase joint mobility /flexibility by 6-8 weeks. (In Progress)     Plan      Continue 2x week during the 90 day certification period  07/19/2023  to 10/192/2023   with established Plan Of Care in pursuit of Occupational Therapy goals.      Aleah Eastman, OT

## 2023-08-16 ENCOUNTER — CLINICAL SUPPORT (OUTPATIENT)
Dept: REHABILITATION | Facility: HOSPITAL | Age: 33
End: 2023-08-16
Payer: MEDICAID

## 2023-08-16 DIAGNOSIS — M79.641 RIGHT HAND PAIN: Primary | ICD-10-CM

## 2023-08-16 PROCEDURE — 97530 THERAPEUTIC ACTIVITIES: CPT | Mod: PN

## 2023-08-16 NOTE — PROGRESS NOTES
"                                  Occupational Therapy Daily Treatment  Note       Date: 8/18/2023  Name: Patricia James  Clinic Number: [id number]    Therapy Diagnosis: M65.841 (ICD-10-CM) - Stenosing tenosynovitis of finger of right hand  Encounter Diagnosis   Name Primary?    Right hand pain Yes     Physician: Dr. Omega Fontanez    Physician Orders: M65.841 (ICD-10-CM) - Stenosing tenosynovitis of finger of right hand; evaluate and treat;  Paraffin, Ultrasound, slow progressive strengthening, progressive resistive exercises, range of motion, deep heat, massage, dry needling   Medical Diagnosis: M65.841 (ICD-10-CM) - Stenosing tenosynovitis of finger of right hand; (Right) hand pain  Surgical Procedure and Date: Not Applicable     Insurance Authorization Period Expiration: 07/19/2023-09/24/2023  Plan of Care Certification Period: 07/19/2023-10/19/2023  Date of Return to MD: 08/17/2023    Evaluation FOTO: 07/19/2023 = 52%  Reassessment FOTO: 08/16/2023 = 43%    Visit # / Visits authorized: 7 / 12  Time In: 10:15  Time Out: 10:55  Total Billable Time: 40  minutes    Precautions:  Standard            Subjective     Patient reports: " I went to see Dr. Fontanez yesterday and he wants me to do an MRI and another surgery.  I want to get a second opinion before I get surgery again."    Pain: 8/10   Location of pain: (Right) wrist / forearm / hand     Objective    Patient seen by Occupational Therapy this session. Tx consisted of:          Therapeutic Activities to improve functional performance while increasing strength, endurance, range of motion,  and flexibility  x   40  minutes:    -Fluidotherapy to  (Right)   hand to increase blood flow, circulation, tissue elasticity, desensitization, sensory re-education and for pain management  x 10 minutes.      -Manual Therapy techniques to  (Right) wrist  including joint mobilizations, stretching, and Passive Range of Motion to increase joint mobility, range of motion  and for " "pain management  x  2 minutes   -Soft Tissue Mobilization to  (Right) hand into wrist and forearm  from distal to proximal to decrease edema and increase range of motion and functional abilities  x  2  minutes      - Velcro checkerboard (on wedge) with "dart throwing" pattern x 1 repetition  -NP  - 0.5# Dorsiflexion/Volar flexion (Supination /Pronation ), Radial Deviation / Ulnar Deviation x 10 repetitions  - Wrist roller coaster for Active Range of Motion  of wrist in all planes x 10 repetitions    - Weighted ball on tabletop for Dorsiflexion /Volar Flexion  stretches x 20 repetitions    - Soccer ball (elbows flexed 90*) for Supination / Pronation stretch x 10 repetitions -NP  - Wrist wheel with YELLOW Theraband for Supination / Pronation  x 10 repetitions   - Mini colored pegboard for Fine Motor Coordination and in-hand manipulation  (5 in-hand) x 20 pegs    - YELLOW digiflex for isolated x 10 repetitions;  RED digiflex for composite digital flexion x 20 repetitions   - RED Theraputty with PVC for "cookie cutting" and medicine top simulating opening containers x 15 repetitions   - 25# Progressive Hand Gripper (black spring) for composite  x 10 repetitions  - Wooden pegboard with YELLOW clothespins with 3PT pinch x 2 repetitions      -Ultrasound applied to  dorsal aspect of (Right) wrist / forearm  on  3.0 Mhz with 0.7 w/cm2 @ 50 % duty cycle (Biofreeze)  to decrease pain and inflammation and increase tissue elasticity x 8 minutes.       Applied ROCKtape to dorsal and volar aspect of (Right) hand / wrist into forearm in "through finger" pattern for edema management.       -NP = Not Performed         Initiate in future therapy sessions:       - ### Theraputty for pulling x 10 Repetitions    - YELLOW digiweb for composite digital Extension and  intrinsics x 20 repetitions     -  #### dowel for Supination /Pronation  x 20 repetitions   - Low load prolonged stretches for Dorsiflexion /Volar Flexion with ### leg " "weight ( on wedge) x 30 seconds each x 2 repetitions   - ### Flexbar for Supination /Pronation  and Dorsiflexion /Volar Flexion x 20 repetitions    - ### Wrist exerstick in standing for Dorsiflexion / Volar Flexion  x 3 repetitions          Assessment     Patient will continue to benefit from skilled Occupational Therapy intervention to increase functional abilities, range of motion, and strength and pain control.  Patient. demonstrated proper understanding of each exercise.  Patient continues to require verbal and tactile cues for throughout therapy session to maintain position and prevent compensation.   Patient continues to be limited in functional and leisurely pursuits. Pain limits patient's participation in activities of daily living.  Patient is not able to carryout necessary vocational tasks.   Patient's spiritual, cultural and educational needs considered and patient agreeable to plan of care and goals.  Patient is making good progress towards established goals.  Patient tolerated exercises / activities within pain tolerance.   Reviewed Home Exercise Program with patient., see EPIC under "patient instructions" for provided exercises, activity modifications and limitations, modalities for home pain management.  Patient. demo understanding of above.    Patient. tolerated Fluidotherapy & Ultrasound  with no irritation.         Patient tolerated addition of wooden pegboard with clothespins for  3PT pinch with no reported pain.        New/Revised Goals: Continue Plan Of Care   Goals:  1)   Patient to be Independent with Home exercise program and modalities for pain management by 1 week.  (MET)   2)   Patient will report   6/10 pain on average with activity to assist with exercises by 6-8 weeks.  (Revised 08/16/2023)   3)   Patient will increase range of Motion by 3-5 degrees to increase functional use for activities of daily living by 6-8 weeks.  ( In Progress)   4)   Patient will increase  strength 3-5 " pounds to open containers by 6-8 weeks.  ( In Progress)   5)   Patient will increase pinch by 1-3 pounds for buttoning by 6-8 weeks.  ( In Progress)   6)   Patient will decrease edema by 0.1-0.3 millimeters  to increase joint mobility /flexibility by 6-8 weeks. (In Progress)     Plan      Continue 2x week during the 90 day certification period  07/19/2023  to 10/192/2023   with established Plan Of Care in pursuit of Occupational Therapy goals.      Aleah Eastman, OT

## 2023-08-18 ENCOUNTER — CLINICAL SUPPORT (OUTPATIENT)
Dept: REHABILITATION | Facility: HOSPITAL | Age: 33
End: 2023-08-18
Payer: MEDICAID

## 2023-08-18 DIAGNOSIS — M79.641 RIGHT HAND PAIN: Primary | ICD-10-CM

## 2023-08-18 PROCEDURE — 97530 THERAPEUTIC ACTIVITIES: CPT | Mod: PN

## 2023-08-18 NOTE — PROGRESS NOTES
"                                  Occupational Therapy Daily Treatment  Note       Date: 8/21/2023  Name: Patricia James  Clinic Number: [id number]    Therapy Diagnosis: M65.841 (ICD-10-CM) - Stenosing tenosynovitis of finger of right hand  Encounter Diagnosis   Name Primary?    Right hand pain Yes     Physician: Dr. Omega Fontanez    Physician Orders: M65.841 (ICD-10-CM) - Stenosing tenosynovitis of finger of right hand; evaluate and treat;  Paraffin, Ultrasound, slow progressive strengthening, progressive resistive exercises, range of motion, deep heat, massage, dry needling   Medical Diagnosis: M65.841 (ICD-10-CM) - Stenosing tenosynovitis of finger of right hand; (Right) hand pain  Surgical Procedure and Date: Not Applicable     Insurance Authorization Period Expiration: 07/19/2023-09/24/2023  Plan of Care Certification Period: 07/19/2023-10/19/2023  Date of Return to MD: 08/17/2023    Evaluation FOTO: 07/19/2023 = 52%  Reassessment FOTO: 08/16/2023 = 43%    Visit # / Visits authorized: 8 / 12  Time In: 10:15  Time Out: 10:55   Total Billable Time: 40  minutes    Precautions:  Standard            Subjective     Patient reports: " I think the tape helped with my arm.  I was not having as much pain."    Pain: 6/10   Location of pain: (Right) wrist / forearm / hand     Objective    Patient seen by Occupational Therapy this session. Tx consisted of:          Therapeutic Activities to improve functional performance while increasing strength, endurance, range of motion,  and flexibility  x   40  minutes:    -Fluidotherapy to  (Right)   hand to increase blood flow, circulation, tissue elasticity, desensitization, sensory re-education and for pain management  x 10 minutes.      -Manual Therapy techniques to  (Right) wrist  including joint mobilizations, stretching, and Passive Range of Motion to increase joint mobility, range of motion  and for pain management  x  2 minutes   -Soft Tissue Mobilization to  (Right) hand " "into wrist and forearm  from distal to proximal to decrease edema and increase range of motion and functional abilities  x  2  minutes      - Velcro checkerboard (on wedge) with "dart throwing" pattern x 1 repetition  -NP  - 0.5# Dorsiflexion/Volar flexion (Supination /Pronation ), Radial Deviation / Ulnar Deviation x 10 repetitions  - Wrist roller coaster for Active Range of Motion  of wrist in all planes x 10 repetitions    - Weighted ball on tabletop for Dorsiflexion /Volar Flexion  stretches x 20 repetitions    - Soccer ball (elbows flexed 90*) for Supination / Pronation stretch x 10 repetitions -NP  - Wrist wheel with YELLOW Theraband for Supination / Pronation  x 10 repetitions   - Mini colored pegboard for Fine Motor Coordination and in-hand manipulation  (5 in-hand) x 20 pegs    - YELLOW digiflex for isolated x 10 repetitions;  RED digiflex for composite digital flexion x 20 repetitions   - RED Theraputty with PVC for "cookie cutting" and medicine top simulating opening containers x 15 repetitions   - 25# Progressive Hand Gripper (black spring) for composite  x 10 repetitions  - Wooden pegboard with RED clothespins with 3PT pinch x 2 repetitions      -Ultrasound applied to  dorsal aspect of (Right) wrist / forearm  on  3.0 Mhz with 0.7 w/cm2 @ 50 % duty cycle (Biofreeze)  to decrease pain and inflammation and increase tissue elasticity x 8 minutes.       Applied ROCKtape to dorsal and volar aspect of (Right) hand / wrist into forearm in "through finger" pattern for edema management.         -NP = Not Performed         Initiate in future therapy sessions:       - ### Theraputty for pulling x 10 Repetitions    - YELLOW digiweb for composite digital Extension and  intrinsics x 20 repetitions     -  #### dowel for Supination /Pronation  x 20 repetitions   - Low load prolonged stretches for Dorsiflexion /Volar Flexion with ### leg weight ( on wedge) x 30 seconds each x 2 repetitions   - ### Flexbar for " "Supination /Pronation  and Dorsiflexion /Volar Flexion x 20 repetitions    - ### Wrist exerstick in standing for Dorsiflexion / Volar Flexion  x 3 repetitions          Assessment     Patient will continue to benefit from skilled Occupational Therapy intervention to increase functional abilities, range of motion, and strength and pain control.  Patient. demonstrated proper understanding of each exercise.  Patient continues to require verbal and tactile cues for throughout therapy session to maintain position and prevent compensation.   Patient continues to be limited in functional and leisurely pursuits. Pain limits patient's participation in activities of daily living.  Patient is not able to carryout necessary vocational tasks.   Patient's spiritual, cultural and educational needs considered and patient agreeable to plan of care and goals.  Patient is making good progress towards established goals.  Patient tolerated exercises / activities within pain tolerance.   Reviewed Home Exercise Program with patient., see EPIC under "patient instructions" for provided exercises, activity modifications and limitations, modalities for home pain management.  Patient. demo understanding of above.    Patient. tolerated Fluidotherapy & Ultrasound  with no irritation.         Patient tolerated increase in resistance during wooden pegboard with clothespins for  3PT pinch with no reported pain.        New/Revised Goals: Continue Plan Of Care   Goals:  1)   Patient to be Independent with Home exercise program and modalities for pain management by 1 week.  (MET)   2)   Patient will report   6/10 pain on average with activity to assist with exercises by 6-8 weeks.  (Revised 08/16/2023)   3)   Patient will increase range of Motion by 3-5 degrees to increase functional use for activities of daily living by 6-8 weeks.  ( In Progress)   4)   Patient will increase  strength 3-5 pounds to open containers by 6-8 weeks.  ( In Progress) "   5)   Patient will increase pinch by 1-3 pounds for buttoning by 6-8 weeks.  ( In Progress)   6)   Patient will decrease edema by 0.1-0.3 millimeters  to increase joint mobility /flexibility by 6-8 weeks. (In Progress)     Plan      Continue 2x week during the 90 day certification period  07/19/2023  to 10/192/2023   with established Plan Of Care in pursuit of Occupational Therapy goals.      Aleah Eastman, OT

## 2023-08-21 ENCOUNTER — CLINICAL SUPPORT (OUTPATIENT)
Dept: REHABILITATION | Facility: HOSPITAL | Age: 33
End: 2023-08-21
Payer: MEDICAID

## 2023-08-21 DIAGNOSIS — M79.641 RIGHT HAND PAIN: Primary | ICD-10-CM

## 2023-08-21 PROCEDURE — 97530 THERAPEUTIC ACTIVITIES: CPT | Mod: PN

## 2023-08-21 NOTE — PROGRESS NOTES
"                                  Occupational Therapy Daily Treatment  Note       Date: 8/23/2023  Name: Patricia James  Clinic Number: [id number]    Therapy Diagnosis: M65.841 (ICD-10-CM) - Stenosing tenosynovitis of finger of right hand  Encounter Diagnosis   Name Primary?    Right hand pain Yes     Physician: Dr. Omega Fontanez    Physician Orders: M65.841 (ICD-10-CM) - Stenosing tenosynovitis of finger of right hand; evaluate and treat;  Paraffin, Ultrasound, slow progressive strengthening, progressive resistive exercises, range of motion, deep heat, massage, dry needling   Medical Diagnosis: M65.841 (ICD-10-CM) - Stenosing tenosynovitis of finger of right hand; (Right) hand pain  Surgical Procedure and Date: Not Applicable     Insurance Authorization Period Expiration: 07/19/2023-09/24/2023  Plan of Care Certification Period: 07/19/2023-10/19/2023  Date of Return to MD: 08/17/2023    Evaluation FOTO: 07/19/2023 = 52%  Reassessment FOTO: 08/16/2023 = 43%    Visit # / Visits authorized: 9 / 12  Time In: 10:15  Time Out: 10:55   Total Billable Time: 40  minutes    Precautions:  Standard            Subjective     Patient reports: " I think I pulled something in my wrist this morning at work.  It seems swollen on the small finger side of my wrist."     Pain: 6/10   Location of pain: (Right) wrist / forearm / hand     Objective    Patient seen by Occupational Therapy this session. Tx consisted of:          Therapeutic Activities to improve functional performance while increasing strength, endurance, range of motion,  and flexibility  x   40  minutes:    -Fluidotherapy to  (Right)   hand to increase blood flow, circulation, tissue elasticity, desensitization, sensory re-education and for pain management  x 10 minutes.      -Manual Therapy techniques to  (Right) wrist  including joint mobilizations, stretching, and Passive Range of Motion to increase joint mobility, range of motion  and for pain management  x  2 " "minutes   -Soft Tissue Mobilization to  (Right) hand into wrist and forearm  from distal to proximal to decrease edema and increase range of motion and functional abilities  x  2  minutes      - Velcro checkerboard (on wedge) with "dart throwing" pattern x 1 repetition  -NP  - 0.5# Dorsiflexion/Volar flexion (Supination /Pronation ), Radial Deviation / Ulnar Deviation x 10 repetitions  - Wrist roller coaster for Active Range of Motion  of wrist in all planes x 10 repetitions    - Weighted ball on tabletop for Dorsiflexion /Volar Flexion  stretches x 20 repetitions -NP   - Soccer ball (elbows flexed 90*) for Supination / Pronation stretch x 10 repetitions -NP  - Wrist wheel with YELLOW Theraband for Supination / Pronation  x 10 repetitions   - Mini colored pegboard for Fine Motor Coordination and in-hand manipulation  (5 in-hand) x 20 pegs    - YELLOW digiflex for isolated x 10 repetitions;  RED digiflex for composite digital flexion x 20 repetitions   - RED Theraputty with PVC for "cookie cutting" and medicine top simulating opening containers x 15 repetitions   - RED Theraputty for pulling x 10 Repetitions   - 25# Progressive Hand Gripper (black spring) for composite  x 10 repetitions  - Wooden pegboard with RED clothespins with 3PT pinch x 2 repetitions      -Ultrasound applied to  dorsal / ulnar aspect of (Right) wrist / forearm  on  3.0 Mhz with 0.7 w/cm2 @ 50 % duty cycle (Biofreeze)  to decrease pain and inflammation and increase tissue elasticity x 8 minutes.       Applied ROCKtape to dorsal and volar aspect of (Right) hand / wrist into forearm in "through finger" pattern for edema management.         -NP = Not Performed         Initiate in future therapy sessions:       - YELLOW digiweb for composite digital Extension and  intrinsics x 20 repetitions     -  #### dowel for Supination /Pronation  x 20 repetitions   - Low load prolonged stretches for Dorsiflexion /Volar Flexion with ### leg weight ( on " "wedge) x 30 seconds each x 2 repetitions   - ### Flexbar for Supination /Pronation  and Dorsiflexion /Volar Flexion x 20 repetitions    - ### Wrist exerstick in standing for Dorsiflexion / Volar Flexion  x 3 repetitions          Assessment     Patient will continue to benefit from skilled Occupational Therapy intervention to increase functional abilities, range of motion, and strength and pain control.  Patient. demonstrated proper understanding of each exercise.  Patient continues to require verbal and tactile cues for throughout therapy session to maintain position and prevent compensation.   Patient continues to be limited in functional and leisurely pursuits. Pain limits patient's participation in activities of daily living.  Patient is not able to carryout necessary vocational tasks.   Patient's spiritual, cultural and educational needs considered and patient agreeable to plan of care and goals.  Patient is making good progress towards established goals.  Patient tolerated exercises / activities within pain tolerance.   Reviewed Home Exercise Program with patient., see EPIC under "patient instructions" for provided exercises, activity modifications and limitations, modalities for home pain management.  Patient. demo understanding of above.    Patient. tolerated Fluidotherapy & Ultrasound  with no irritation.         Patient tolerated addition of Theraputty for pulling with no reported pain.        New/Revised Goals: Continue Plan Of Care   Goals:  1)   Patient to be Independent with Home exercise program and modalities for pain management by 1 week.  (MET)   2)   Patient will report   6/10 pain on average with activity to assist with exercises by 6-8 weeks.  (Revised 08/16/2023)   3)   Patient will increase range of Motion by 3-5 degrees to increase functional use for activities of daily living by 6-8 weeks.  ( In Progress)   4)   Patient will increase  strength 3-5 pounds to open containers by 6-8 weeks.  " ( In Progress)   5)   Patient will increase pinch by 1-3 pounds for buttoning by 6-8 weeks.  ( In Progress)   6)   Patient will decrease edema by 0.1-0.3 millimeters  to increase joint mobility /flexibility by 6-8 weeks. (In Progress)     Plan      Continue 2x week during the 90 day certification period  07/19/2023  to 10/192/2023   with established Plan Of Care in pursuit of Occupational Therapy goals.      Aleah Eastman, OT

## 2023-08-23 ENCOUNTER — CLINICAL SUPPORT (OUTPATIENT)
Dept: REHABILITATION | Facility: HOSPITAL | Age: 33
End: 2023-08-23
Payer: MEDICAID

## 2023-08-23 DIAGNOSIS — M79.641 RIGHT HAND PAIN: Primary | ICD-10-CM

## 2023-08-23 PROCEDURE — 97530 THERAPEUTIC ACTIVITIES: CPT | Mod: PN

## 2023-08-23 NOTE — PROGRESS NOTES
"                                  Occupational Therapy Daily Treatment  Note       Date: 8/30/2023  Name: Patricia James  Clinic Number: [id number]    Therapy Diagnosis: M65.841 (ICD-10-CM) - Stenosing tenosynovitis of finger of right hand  Encounter Diagnosis   Name Primary?    Right hand pain Yes     Physician: Dr. Omega Fontanez    Physician Orders: M65.841 (ICD-10-CM) - Stenosing tenosynovitis of finger of right hand; evaluate and treat;  Paraffin, Ultrasound, slow progressive strengthening, progressive resistive exercises, range of motion, deep heat, massage, dry needling   Medical Diagnosis: M65.841 (ICD-10-CM) - Stenosing tenosynovitis of finger of right hand; (Right) hand pain  Surgical Procedure and Date: Not Applicable     Insurance Authorization Period Expiration: 07/19/2023-09/24/2023  Plan of Care Certification Period: 07/19/2023-10/19/2023  Date of Return to MD: as needed    Evaluation FOTO: 07/19/2023 = 52%  Reassessment FOTO: 08/16/2023 = 43%    Visit # / Visits authorized: 10 / 12  Time In: 10:10  Time Out: 10:50   Total Billable Time: 40  minutes    Precautions:  Standard            Subjective     Patient reports: " I am having a lot of pain in my arm today.  I had a lot of pain while working today.   I have been dropping things this morning."    Pain: 8/10   Location of pain: (Right) wrist / forearm / hand     Objective    Patient seen by Occupational Therapy this session. Tx consisted of:          Therapeutic Activities to improve functional performance while increasing strength, endurance, range of motion,  and flexibility  x   40  minutes:    -Fluidotherapy to  (Right)   hand to increase blood flow, circulation, tissue elasticity, desensitization, sensory re-education and for pain management  x 10 minutes.      -Manual Therapy techniques to  (Right) wrist  including joint mobilizations, stretching, and Passive Range of Motion to increase joint mobility, range of motion  and for pain management " " x  2 minutes   -Soft Tissue Mobilization to  (Right) hand into wrist and forearm  from distal to proximal to decrease edema and increase range of motion and functional abilities  x  2  minutes      - Velcro checkerboard (on wedge) with "dart throwing" pattern x 1 repetition  -NP  - 0.5# Dorsiflexion/Volar flexion (Supination /Pronation ), Radial Deviation / Ulnar Deviation x 10 repetitions  - Wrist roller coaster for Active Range of Motion  of wrist in all planes x 10 repetitions    - Weighted ball on tabletop for Dorsiflexion /Volar Flexion  stretches x 20 repetitions -NP   - Soccer ball (elbows flexed 90*) for Supination / Pronation stretch x 10 repetitions -NP  - Wrist wheel with YELLOW Theraband for Supination / Pronation  x 10 repetitions   - Mini colored pegboard for Fine Motor Coordination and in-hand manipulation  (5 in-hand) x 20 pegs  -NP  - YELLOW digiflex for isolated x 10 repetitions;  RED digiflex for composite digital flexion x 20 repetitions   - RED Theraputty with PVC for "cookie cutting" and medicine top simulating opening containers x 15 repetitions   - RED Theraputty for pulling x 10 Repetitions   - 25# Progressive Hand Gripper (black spring) for composite  x 10 repetitions  - Wooden pegboard with RED clothespins with 3PT pinch x 2 repetitions  - YELLOW digiweb for composite digital Extension and  intrinsics x 10 repetitions      -Ultrasound applied to  dorsal aspect of (Right) wrist / forearm  on  3.0 Mhz with 0.7 w/cm2 @ 50 % duty cycle (Biofreeze)  to decrease pain and inflammation and increase tissue elasticity x 8 minutes.       Applied ROCKtape to dorsal and volar aspect of (Right) hand / wrist into forearm in "through finger" pattern for edema management.         -NP = Not Performed         Initiate in future therapy sessions:     -  #### dowel for Supination /Pronation  x 20 repetitions   - Low load prolonged stretches for Dorsiflexion /Volar Flexion with ### leg weight ( on wedge) x " "30 seconds each x 2 repetitions   - ### Flexbar for Supination /Pronation  and Dorsiflexion /Volar Flexion x 20 repetitions    - ### Wrist exerstick in standing for Dorsiflexion / Volar Flexion  x 3 repetitions          Assessment     Patient will continue to benefit from skilled Occupational Therapy intervention to increase functional abilities, range of motion, and strength and pain control.  Patient. demonstrated proper understanding of each exercise.  Patient continues to require verbal and tactile cues for throughout therapy session to maintain position and prevent compensation.   Patient continues to be limited in functional and leisurely pursuits. Pain limits patient's participation in activities of daily living.  Patient is not able to carryout necessary vocational tasks.   Patient's spiritual, cultural and educational needs considered and patient agreeable to plan of care and goals.  Patient is making good progress towards established goals.  Patient tolerated exercises / activities within pain tolerance.   Reviewed Home Exercise Program with patient., see EPIC under "patient instructions" for provided exercises, activity modifications and limitations, modalities for home pain management.  Patient. demo understanding of above.    Patient. tolerated Fluidotherapy & Ultrasound  with no irritation.         Patient tolerated addition of digiweb for composite digital Extension and intrinsics with no reported pain.        New/Revised Goals: Continue Plan Of Care   Goals:  1)   Patient to be Independent with Home exercise program and modalities for pain management by 1 week.  (MET)   2)   Patient will report   6/10 pain on average with activity to assist with exercises by 6-8 weeks.  (Revised 08/16/2023)   3)   Patient will increase range of Motion by 3-5 degrees to increase functional use for activities of daily living by 6-8 weeks.  ( In Progress)   4)   Patient will increase  strength 3-5 pounds to open " containers by 6-8 weeks.  ( In Progress)   5)   Patient will increase pinch by 1-3 pounds for buttoning by 6-8 weeks.  ( In Progress)   6)   Patient will decrease edema by 0.1-0.3 millimeters  to increase joint mobility /flexibility by 6-8 weeks. (In Progress)     Plan      Continue 2x week during the 90 day certification period  07/19/2023  to 10/19/2023   with established Plan Of Care in pursuit of Occupational Therapy goals.      Aleah Eastman, OT

## 2023-08-25 ENCOUNTER — HOSPITAL ENCOUNTER (EMERGENCY)
Facility: HOSPITAL | Age: 33
Discharge: HOME | End: 2023-08-25
Attending: STUDENT IN AN ORGANIZED HEALTH CARE EDUCATION/TRAINING PROGRAM
Payer: MEDICAID

## 2023-08-25 VITALS
BODY MASS INDEX: 40.48 KG/M2 | DIASTOLIC BLOOD PRESSURE: 67 MMHG | RESPIRATION RATE: 18 BRPM | WEIGHT: 220 LBS | HEIGHT: 62 IN | TEMPERATURE: 98 F | SYSTOLIC BLOOD PRESSURE: 125 MMHG | OXYGEN SATURATION: 97 % | HEART RATE: 88 BPM

## 2023-08-25 DIAGNOSIS — R51.9 ACUTE NONINTRACTABLE HEADACHE, UNSPECIFIED HEADACHE TYPE: Primary | ICD-10-CM

## 2023-08-25 LAB — B-HCG UR QL: NEGATIVE

## 2023-08-25 PROCEDURE — 96375 TX/PRO/DX INJ NEW DRUG ADDON: CPT

## 2023-08-25 PROCEDURE — 96361 HYDRATE IV INFUSION ADD-ON: CPT

## 2023-08-25 PROCEDURE — 25000003 PHARM REV CODE 250: Performed by: STUDENT IN AN ORGANIZED HEALTH CARE EDUCATION/TRAINING PROGRAM

## 2023-08-25 PROCEDURE — 81025 URINE PREGNANCY TEST: CPT | Performed by: STUDENT IN AN ORGANIZED HEALTH CARE EDUCATION/TRAINING PROGRAM

## 2023-08-25 PROCEDURE — 96374 THER/PROPH/DIAG INJ IV PUSH: CPT

## 2023-08-25 PROCEDURE — 63600175 PHARM REV CODE 636 W HCPCS: Performed by: STUDENT IN AN ORGANIZED HEALTH CARE EDUCATION/TRAINING PROGRAM

## 2023-08-25 PROCEDURE — 99284 EMERGENCY DEPT VISIT MOD MDM: CPT | Mod: 25

## 2023-08-25 RX ORDER — MAGNESIUM SULFATE HEPTAHYDRATE 40 MG/ML
2 INJECTION, SOLUTION INTRAVENOUS ONCE
Status: COMPLETED | OUTPATIENT
Start: 2023-08-25 | End: 2023-08-25

## 2023-08-25 RX ORDER — PROCHLORPERAZINE EDISYLATE 5 MG/ML
10 INJECTION INTRAMUSCULAR; INTRAVENOUS
Status: COMPLETED | OUTPATIENT
Start: 2023-08-25 | End: 2023-08-25

## 2023-08-25 RX ORDER — DIPHENHYDRAMINE HYDROCHLORIDE 50 MG/ML
12.5 INJECTION INTRAMUSCULAR; INTRAVENOUS
Status: COMPLETED | OUTPATIENT
Start: 2023-08-25 | End: 2023-08-25

## 2023-08-25 RX ORDER — BUTALBITAL, ACETAMINOPHEN AND CAFFEINE 50; 325; 40 MG/1; MG/1; MG/1
1 TABLET ORAL EVERY 4 HOURS PRN
Qty: 30 TABLET | Refills: 0 | Status: SHIPPED | OUTPATIENT
Start: 2023-08-25 | End: 2023-09-24

## 2023-08-25 RX ADMIN — SODIUM CHLORIDE 2000 ML: 9 INJECTION, SOLUTION INTRAVENOUS at 10:08

## 2023-08-25 RX ADMIN — MAGNESIUM SULFATE IN WATER 2 G: 40 INJECTION, SOLUTION INTRAVENOUS at 11:08

## 2023-08-25 RX ADMIN — DIPHENHYDRAMINE HYDROCHLORIDE 12.5 MG: 50 INJECTION INTRAMUSCULAR; INTRAVENOUS at 10:08

## 2023-08-25 RX ADMIN — PROCHLORPERAZINE EDISYLATE 10 MG: 5 INJECTION INTRAMUSCULAR; INTRAVENOUS at 10:08

## 2023-08-26 NOTE — ED PROVIDER NOTES
"Encounter Date: 2023       History     Chief Complaint   Patient presents with    Headache     Started this afternoon and "can't get rid of it." Took motrin at 1700       HPI  33-year-old no medical history who presents complaining of headache.  Started this afternoon around 3:00 p.m., constant, located at front of head, 8/10 intensity, nonradiating, worsened lying flat and with loud noises, partially relieved with taking Motrin.      Denies fevers, chills, neck stiffness or neck pain, weight loss, numbness or weakness in any of her extremities.  Has had headaches in the past, has not followed up with a neurologist previously    Review of patient's allergies indicates:   Allergen Reactions    Keflex [cephalexin]      rash    Latex, natural rubber Hives     Past Medical History:   Diagnosis Date    Diabetes mellitus     Renal disorder     kidney stone     Past Surgical History:   Procedure Laterality Date     SECTION      x 2    VULVECTOMY Left 2022    Procedure: VULVECTOMY, EXCISION OF VULVAR MASS AND OTHER INDICATED PROCEDURES;  Surgeon: Miguel Ángel Arrington MD;  Location: [facility];  Service: OB/GYN;  Laterality: Left;    WRIST ARTHROSCOPY W/ TRIANGULAR FIBROCARTILAGE REPAIR       Family History   Problem Relation Age of Onset    Cancer Maternal Grandmother     Heart disease Maternal Grandmother     Leukemia Maternal Grandmother     Hyperlipidemia Father     Hypertension Father     Diabetes Father     Breast cancer Maternal Aunt     Ovarian cancer Maternal Aunt      Social History     Tobacco Use    Smoking status: Never    Smokeless tobacco: Never   Substance Use Topics    Alcohol use: Yes     Comment: occasionally    Drug use: No     Review of Systems   All other systems reviewed and are negative.      Physical Exam     Initial Vitals [23 1820]   BP Pulse Resp Temp SpO2   (!) 153/101 91 18 98 °F (36.7 °C) 97 %      MAP       --         Physical Exam    Constitutional: She appears well-developed " and well-nourished.   HENT:   Head: Normocephalic and atraumatic. Hair is normal.   Eyes: Conjunctivae and EOM are normal.   Neck: Neck supple. No stridor present.   Normal range of motion.  Cardiovascular:  Normal rate.           No murmur heard.  Pulmonary/Chest: Breath sounds normal. No respiratory distress.   Abdominal: Abdomen is soft. There is no abdominal tenderness.   Musculoskeletal:         General: No tenderness. Normal range of motion.      Cervical back: Normal range of motion and neck supple.     Neurological: She is alert and oriented to person, place, and time. She has normal strength and normal reflexes.   Skin: Skin is warm and dry.   Psychiatric: She has a normal mood and affect. Her speech is normal. Thought content normal.         ED Course   Procedures  Pgy5 MDM:   33-year-old no past medical history who presents complaining of headache for 1 day after working all day in the heat.  She reports the headache was sudden onset.  It was unrelieved despite use of ibuprofen.  Her vital signs were stable, on exam she is awake alert and well-appearing, she is a normal neurologic exam with good strength in lower extremities, normal speech, she ambulated independently without pain.  Most concerning for tension headache, migraine headache, subarachnoid bleed.  Because of the sudden onset of her headache we attempted to obtain CT scan of head without contrast.  Treated with fluids and Compazine and Benadryl after negative UPT.  After these treatments patient reports that her headaches entirely she defers further CT scan of head although was advised that we can not ruled out probability of a subarachnoid hemorrhage.  She will be discharged with script for Fioricet and instructions to follow up with her primary care physician for further testing as an outpatient and come back to the emergency department for any worsening symptoms or other concerns  Javan Mcgee MD  [facility]  HO[facility]  [facility] ED  [phone]  11:19 PM 8/25/2023     Labs Reviewed - No data to display       Imaging Results    None          Medications - No data to display  Medical Decision Making  Risk  Prescription drug management.                               Clinical Impression:                 Jim Mcgee MD  Resident  08/26/23 [id number]

## 2023-08-30 ENCOUNTER — CLINICAL SUPPORT (OUTPATIENT)
Dept: REHABILITATION | Facility: HOSPITAL | Age: 33
End: 2023-08-30
Payer: MEDICAID

## 2023-08-30 ENCOUNTER — PATIENT MESSAGE (OUTPATIENT)
Dept: PRIMARY CARE CLINIC | Facility: CLINIC | Age: 33
End: 2023-08-30
Payer: MEDICAID

## 2023-08-30 DIAGNOSIS — M79.641 RIGHT HAND PAIN: Primary | ICD-10-CM

## 2023-08-30 PROCEDURE — 97530 THERAPEUTIC ACTIVITIES: CPT | Mod: PN

## 2023-09-01 NOTE — PROGRESS NOTES
"                                  Occupational Therapy Daily Treatment  Note       Date: 9/5/2023  Name: Patricia James  Clinic Number: [id number]    Therapy Diagnosis: M65.841 (ICD-10-CM) - Stenosing tenosynovitis of finger of right hand  Encounter Diagnosis   Name Primary?    Right hand pain Yes     Physician: Dr. Omega Fontanez    Physician Orders: M65.841 (ICD-10-CM) - Stenosing tenosynovitis of finger of right hand; evaluate and treat;  Paraffin, Ultrasound, slow progressive strengthening, progressive resistive exercises, range of motion, deep heat, massage, dry needling   Medical Diagnosis: M65.841 (ICD-10-CM) - Stenosing tenosynovitis of finger of right hand; (Right) hand pain  Surgical Procedure and Date: Not Applicable     Insurance Authorization Period Expiration: 07/19/2023-11/25/2023  Plan of Care Certification Period: 07/19/2023-10/19/2023  Date of Return to MD: as needed    Evaluation FOTO: 07/19/2023 = 52%  Reassessment FOTO: 08/16/2023 = 43%    Visit # / Visits authorized: 11 / 24  Time In: 10:00  Time Out: 10:45    Total Billable Time: 40  minutes    Precautions:  Standard            Subjective     Patient reports: " I am feeling a little better today in my arm.  I got the results from the MRI which showed a tear in my wrist."     Pain: 6/10   Location of pain: (Right) wrist / forearm / hand     Objective    Patient seen by Occupational Therapy this session. Tx consisted of:          Therapeutic Activities to improve functional performance while increasing strength, endurance, range of motion,  and flexibility  x   40  minutes:    -Fluidotherapy to  (Right)   hand to increase blood flow, circulation, tissue elasticity, desensitization, sensory re-education and for pain management  x 10 minutes.      -Manual Therapy techniques to  (Right) wrist  including joint mobilizations, stretching, and Passive Range of Motion to increase joint mobility, range of motion  and for pain management  x  2 minutes " "  -Soft Tissue Mobilization to  (Right) hand into wrist and forearm  from distal to proximal to decrease edema and increase range of motion and functional abilities  x  2  minutes      - Velcro checkerboard (on wedge) with "dart throwing" pattern x 1 repetition  -NP  - 0.5# Dorsiflexion/Volar flexion (Supination /Pronation ), Radial Deviation / Ulnar Deviation x 10 repetitions  - Wrist roller coaster for Active Range of Motion  of wrist in all planes x 10 repetitions    - Weighted ball on tabletop for Dorsiflexion /Volar Flexion  stretches x 20 repetitions -NP   - Soccer ball (elbows flexed 90*) for Supination / Pronation stretch x 10 repetitions -NP  - Mini colored pegboard for Fine Motor Coordination and in-hand manipulation  (5 in-hand) x 20 pegs  -NP  - RED digiflex for isolated x 10 repetitions;  GREEN digiflex for composite digital flexion x 10 repetitions   - RED Theraputty with PVC for "cookie cutting" and medicine top simulating opening containers x 15 repetitions   - RED Theraputty for pulling x 10 Repetitions   - 25# Progressive Hand Gripper (black spring) for composite  x 10 repetitions  - Wooden pegboard with RED clothespins with 3PT pinch x 2 repetitions  - YELLOW digiweb for composite digital Extension and  intrinsics x 10 repetitions  - Wrist wheel for Supination / Pronation  x 10 repetitions       -Ultrasound applied to  dorsal aspect of (Right) wrist / forearm  on  3.0 Mhz with 0.7 w/cm2 @ 50 % duty cycle (Biofreeze)  to decrease pain and inflammation and increase tissue elasticity x 8 minutes.       Applied ROCKtape to dorsal and volar aspect of (Right) hand / wrist into forearm in "through finger" pattern for edema management and circumferential around wrist with tension on ulnar aspect.   Reviewed wearing schedule ( 3-4 days) , precautions, and removal ( soap and water).  Patient demo understanding of above.       -NP = Not Performed         Initiate in future therapy sessions:     - wrist " "wheel with Tband    -  #### dowel for Supination /Pronation  x 20 repetitions   - Low load prolonged stretches for Dorsiflexion /Volar Flexion with ### leg weight ( on wedge) x 30 seconds each x 2 repetitions   - ### Flexbar for Supination /Pronation  and Dorsiflexion /Volar Flexion x 20 repetitions    - ### Wrist exerstick in standing for Dorsiflexion / Volar Flexion  x 3 repetitions          Assessment     Patient will continue to benefit from skilled Occupational Therapy intervention to increase functional abilities, range of motion, and strength and pain control.  Patient. demonstrated proper understanding of each exercise.  Patient continues to require verbal and tactile cues for throughout therapy session to maintain position and prevent compensation.   Patient continues to be limited in functional and leisurely pursuits. Pain limits patient's participation in activities of daily living.  Patient is not able to carryout necessary vocational tasks.   Patient's spiritual, cultural and educational needs considered and patient agreeable to plan of care and goals.  Patient is making good progress towards established goals.  Patient tolerated exercises / activities within pain tolerance.   Reviewed Home Exercise Program with patient., see EPIC under "patient instructions" for provided exercises, activity modifications and limitations, modalities for home pain management.  Patient. demo understanding of above.    Patient. tolerated Fluidotherapy & Ultrasound  with no irritation.         Patient tolerated increase in resistance during digiflex for isolated and composite digital flexion ( decreased repetitions during composite 2* to reported fatigue) with no reported pain.   Decreased resistance during wrist wheel 2* to fatigue.        New/Revised Goals: Continue Plan Of Care   Goals:  1)   Patient to be Independent with Home exercise program and modalities for pain management by 1 week.  (MET)   2)   Patient will " report   6/10 pain on average with activity to assist with exercises by 6-8 weeks.  (Revised 08/16/2023)   3)   Patient will increase range of Motion by 3-5 degrees to increase functional use for activities of daily living by 6-8 weeks.  ( In Progress)   4)   Patient will increase  strength 3-5 pounds to open containers by 6-8 weeks.  ( In Progress)   5)   Patient will increase pinch by 1-3 pounds for buttoning by 6-8 weeks.  ( In Progress)   6)   Patient will decrease edema by 0.1-0.3 millimeters  to increase joint mobility /flexibility by 6-8 weeks. (In Progress)     Plan      Continue 2x week during the 90 day certification period  07/19/2023  to 10/19/2023   with established Plan Of Care in pursuit of Occupational Therapy goals.      Aleah Eastman, OT

## 2023-09-05 ENCOUNTER — CLINICAL SUPPORT (OUTPATIENT)
Dept: REHABILITATION | Facility: HOSPITAL | Age: 33
End: 2023-09-05
Payer: MEDICAID

## 2023-09-05 DIAGNOSIS — M79.641 RIGHT HAND PAIN: Primary | ICD-10-CM

## 2023-09-05 PROCEDURE — 97530 THERAPEUTIC ACTIVITIES: CPT | Mod: PN

## 2023-09-05 NOTE — PROGRESS NOTES
"                                  Occupational Therapy Daily Treatment  Note       Date: 9/7/2023  Name: Patricia James  Clinic Number: [id number]    Therapy Diagnosis: M65.841 (ICD-10-CM) - Stenosing tenosynovitis of finger of right hand  Encounter Diagnosis   Name Primary?    Right hand pain Yes     Physician: Dr. Omega Fontanez    Physician Orders: M65.841 (ICD-10-CM) - Stenosing tenosynovitis of finger of right hand; evaluate and treat;  Paraffin, Ultrasound, slow progressive strengthening, progressive resistive exercises, range of motion, deep heat, massage, dry needling   Medical Diagnosis: M65.841 (ICD-10-CM) - Stenosing tenosynovitis of finger of right hand; (Right) hand pain  Surgical Procedure and Date: Not Applicable     Insurance Authorization Period Expiration: 07/19/2023-11/25/2023  Plan of Care Certification Period: 07/19/2023-10/19/2023  Date of Return to MD: as needed    Evaluation FOTO: 07/19/2023 = 52%  Reassessment FOTO: 08/16/2023 = 43%    Visit # / Visits authorized: 12 / 24  Time In: 10:00  Time Out: 10:45    Total Billable Time: 40  minutes    Precautions:  Standard            Subjective     Patient reports: " I am in a lot of pain today.  The new tape did not stay on my wrist.  It felt good while it was on though.  I was able to get a referral for another hand doctor to get a second opinion."     Pain: 8/10   Location of pain: (Right) wrist / forearm / hand     Objective    Patient seen by Occupational Therapy this session. Tx consisted of:          Therapeutic Activities to improve functional performance while increasing strength, endurance, range of motion,  and flexibility  x   40  minutes:    -Fluidotherapy to  (Right)   hand to increase blood flow, circulation, tissue elasticity, desensitization, sensory re-education and for pain management  x 10 minutes.      -Manual Therapy techniques to  (Right) wrist  including joint mobilizations, stretching, and Passive Range of Motion to " "increase joint mobility, range of motion  and for pain management  x  2 minutes   -Soft Tissue Mobilization to  (Right) hand into wrist and forearm  from distal to proximal to decrease edema and increase range of motion and functional abilities  x  2  minutes      - Velcro checkerboard (on wedge) with "dart throwing" pattern x 1 repetition  -NP  - 0.5# Dorsiflexion/Volar flexion (Supination /Pronation ), Radial Deviation / Ulnar Deviation x 10 repetitions  - Wrist roller coaster for Active Range of Motion  of wrist in all planes x 10 repetitions    - Weighted ball on tabletop for Dorsiflexion /Volar Flexion  stretches x 20 repetitions -NP   - Soccer ball (elbows flexed 90*) for Supination / Pronation stretch x 10 repetitions -NP  - Mini colored pegboard for Fine Motor Coordination and in-hand manipulation  (5 in-hand) x 20 pegs  -NP  - RED digiflex for isolated x 10 repetitions;  GREEN digiflex for composite digital flexion x 10 repetitions   - RED Theraputty with PVC for "cookie cutting" and medicine top simulating opening containers x 15 repetitions   - RED Theraputty for pulling x 10 Repetitions   - 25# Progressive Hand Gripper (black spring) for composite  x 10 repetitions  - Wooden pegboard with RED clothespins with 3PT pinch x 2 repetitions  - YELLOW digiweb for composite digital Extension x 10 repetitions   - RED digiweb for intrinsics x 10 repetitions  - Wrist wheel for Supination / Pronation  x 10 repetitions       -Ultrasound applied to  dorsal aspect of (Right) wrist / forearm  on  3.0 Mhz with 0.7 w/cm2 @ 50 % duty cycle (Biofreeze)  to decrease pain and inflammation and increase tissue elasticity x 8 minutes.       Applied ROCKtape to dorsal and volar aspect of (Right) hand / wrist into forearm in "through finger" pattern for edema management and  circumferential around wrist with tension on ulnar aspect.   Reviewed wearing schedule ( 3-4 days) , precautions, and removal ( soap and water).  Patient " "demo understanding of above.       -NP = Not Performed         Initiate in future therapy sessions:     - wrist wheel with Tband    -  #### dowel for Supination /Pronation  x 20 repetitions   - Low load prolonged stretches for Dorsiflexion /Volar Flexion with ### leg weight ( on wedge) x 30 seconds each x 2 repetitions   - ### Flexbar for Supination /Pronation  and Dorsiflexion /Volar Flexion x 20 repetitions    - ### Wrist exerstick in standing for Dorsiflexion / Volar Flexion  x 3 repetitions          Assessment     Patient will continue to benefit from skilled Occupational Therapy intervention to increase functional abilities, range of motion, and strength and pain control.  Patient. demonstrated proper understanding of each exercise.  Patient continues to require verbal and tactile cues for throughout therapy session to maintain position and prevent compensation.   Patient continues to be limited in functional and leisurely pursuits. Pain limits patient's participation in activities of daily living.  Patient is not able to carryout necessary vocational tasks.   Patient's spiritual, cultural and educational needs considered and patient agreeable to plan of care and goals.  Patient is making good progress towards established goals.  Patient tolerated exercises / activities within pain tolerance.   Reviewed Home Exercise Program with patient., see EPIC under "patient instructions" for provided exercises, activity modifications and limitations, modalities for home pain management.  Patient. demo understanding of above.    Patient. tolerated Fluidotherapy & Ultrasound  with no irritation.         Patient tolerated increase in resistance during digiweb for intrinsics with no reported pain.         New/Revised Goals: Continue Plan Of Care   Goals:  1)   Patient to be Independent with Home exercise program and modalities for pain management by 1 week.  (MET)   2)   Patient will report   6/10 pain on average with " activity to assist with exercises by 6-8 weeks.  (Revised 08/16/2023)   3)   Patient will increase range of Motion by 3-5 degrees to increase functional use for activities of daily living by 6-8 weeks.  ( In Progress)   4)   Patient will increase  strength 3-5 pounds to open containers by 6-8 weeks.  ( In Progress)   5)   Patient will increase pinch by 1-3 pounds for buttoning by 6-8 weeks.  ( In Progress)   6)   Patient will decrease edema by 0.1-0.3 millimeters  to increase joint mobility /flexibility by 6-8 weeks. (In Progress)     Plan      Continue 2x week during the 90 day certification period  07/19/2023  to 10/19/2023   with established Plan Of Care in pursuit of Occupational Therapy goals.      Aleah Eastman, OT

## 2023-09-07 ENCOUNTER — CLINICAL SUPPORT (OUTPATIENT)
Dept: REHABILITATION | Facility: HOSPITAL | Age: 33
End: 2023-09-07
Payer: MEDICAID

## 2023-09-07 DIAGNOSIS — M79.641 RIGHT HAND PAIN: Primary | ICD-10-CM

## 2023-09-07 PROCEDURE — 97530 THERAPEUTIC ACTIVITIES: CPT | Mod: PN

## 2023-09-07 NOTE — PROGRESS NOTES
"                                  Occupational Therapy Daily Treatment  Note       Date: 9/12/2023  Name: Patricia James  Clinic Number: [id number]    Therapy Diagnosis: M65.841 (ICD-10-CM) - Stenosing tenosynovitis of finger of right hand  Encounter Diagnosis   Name Primary?    Right hand pain Yes     Physician: Dr. Omega Fontanez    Physician Orders: M65.841 (ICD-10-CM) - Stenosing tenosynovitis of finger of right hand; evaluate and treat;  Paraffin, Ultrasound, slow progressive strengthening, progressive resistive exercises, range of motion, deep heat, massage, dry needling   Medical Diagnosis: M65.841 (ICD-10-CM) - Stenosing tenosynovitis of finger of right hand; (Right) hand pain  Surgical Procedure and Date: Not Applicable     Insurance Authorization Period Expiration: 07/19/2023-11/25/2023  Plan of Care Certification Period: 07/19/2023-10/19/2023  Date of Return to MD: as needed    Evaluation FOTO: 07/19/2023 = 52%  Reassessment FOTO: 08/16/2023 = 43%    Visit # / Visits authorized: 13 / 24  Time In: 9:15  Time Out: 10:00  Total Billable Time: 40  minutes    Precautions:  Standard            Subjective     Patient reports: " I was able to get the wrist widget and I have been wearing it.  I think it might be helping.  I have noticed that it is still swollen."     Pain: 7/10   Location of pain: (Right) wrist / forearm / hand     Objective    Patient seen by Occupational Therapy this session. Tx consisted of:          Therapeutic Activities to improve functional performance while increasing strength, endurance, range of motion,  and flexibility  x   40  minutes:    -Fluidotherapy to  (Right)   hand to increase blood flow, circulation, tissue elasticity, desensitization, sensory re-education and for pain management  x 8 minutes.      -Manual Therapy techniques to  (Right) wrist  including joint mobilizations, stretching, and Passive Range of Motion to increase joint mobility, range of motion  and for pain " "management  x  2 minutes   -Soft Tissue Mobilization to  (Right) hand into wrist and forearm  from distal to proximal to decrease edema and increase range of motion and functional abilities  x  2  minutes      - Velcro checkerboard (on wedge) with "dart throwing" pattern x 1 repetition  -NP  - 0.5# Dorsiflexion/Volar flexion (Supination /Pronation ), Radial Deviation / Ulnar Deviation x 10 repetitions  - Wrist roller coaster for Active Range of Motion  of wrist in all planes x 10 repetitions    - Weighted ball on tabletop for Dorsiflexion /Volar Flexion  stretches x 20 repetitions -NP   - Soccer ball (elbows flexed 90*) for Supination / Pronation stretch x 10 repetitions -NP  - Mini colored pegboard for Fine Motor Coordination and in-hand manipulation  (5 in-hand) x 20 pegs  -NP  - RED digiflex for isolated x 10 repetitions;  GREEN digiflex for composite digital flexion x 10 repetitions   - RED Theraputty with PVC for "cookie cutting" and medicine top simulating opening containers x 15 repetitions   - RED Theraputty for pulling x 10 Repetitions   - 25# Progressive Hand Gripper (black spring) for composite  x 10 repetitions  - Wooden pegboard with RED clothespins with 3PT pinch x 2 repetitions  - YELLOW digiweb for composite digital Extension x 10 repetitions   - RED digiweb for intrinsics x 15 repetitions  - Wrist wheel for Supination / Pronation  x 10 repetitions       -Ultrasound applied to  dorsal aspect of (Right) wrist / forearm  on  3.0 Mhz with 0.7 w/cm2 @ 50 % duty cycle (Biofreeze)  to decrease pain and inflammation and increase tissue elasticity x 8 minutes.       Applied ROCKtape to dorsal and volar aspect of (Right) hand / wrist into forearm in "through finger" pattern for edema management and  circumferential  around wrist with tension on ulnar aspect.   Reviewed wearing schedule ( 3-4 days) , precautions, and removal ( soap and water).  Patient demo understanding of above.       -NP = Not Performed " "        Initiate in future therapy sessions:     - wrist wheel with Tband    -  #### dowel for Supination /Pronation  x 20 repetitions   - Low load prolonged stretches for Dorsiflexion /Volar Flexion with ### leg weight ( on wedge) x 30 seconds each x 2 repetitions   - ### Flexbar for Supination /Pronation  and Dorsiflexion /Volar Flexion x 20 repetitions    - ### Wrist exerstick in standing for Dorsiflexion / Volar Flexion  x 3 repetitions          Assessment     Patient will continue to benefit from skilled Occupational Therapy intervention to increase functional abilities, range of motion, and strength and pain control.  Patient. demonstrated proper understanding of each exercise.  Patient continues to require verbal and tactile cues for throughout therapy session to maintain position and prevent compensation.   Patient continues to be limited in functional and leisurely pursuits. Pain limits patient's participation in activities of daily living.  Patient is not able to carryout necessary vocational tasks.   Patient's spiritual, cultural and educational needs considered and patient agreeable to plan of care and goals.  Patient is making good progress towards established goals.  Patient tolerated exercises / activities within pain tolerance.   Reviewed Home Exercise Program with patient., see EPIC under "patient instructions" for provided exercises, activity modifications and limitations, modalities for home pain management.  Patient. demo understanding of above.    Patient. tolerated Fluidotherapy & Ultrasound  with no irritation.         Patient tolerated increase in repetitions during digiweb for intrinsics with no reported pain.  Patient dropped wooden pegs several times throughout activity.        New/Revised Goals: Continue Plan Of Care   Goals:  1)   Patient to be Independent with Home exercise program and modalities for pain management by 1 week.  (MET)   2)   Patient will report   6/10 pain on average " with activity to assist with exercises by 6-8 weeks.  (Revised 08/16/2023)   3)   Patient will increase range of Motion by 3-5 degrees to increase functional use for activities of daily living by 6-8 weeks.  ( In Progress)   4)   Patient will increase  strength 3-5 pounds to open containers by 6-8 weeks.  ( In Progress)   5)   Patient will increase pinch by 1-3 pounds for buttoning by 6-8 weeks.  ( In Progress)   6)   Patient will decrease edema by 0.1-0.3 millimeters  to increase joint mobility /flexibility by 6-8 weeks. (In Progress)     Plan      Continue 2x week during the 90 day certification period  07/19/2023  to 10/19/2023   with established Plan Of Care in pursuit of Occupational Therapy goals.      Aleah Eastman, OT

## 2023-09-12 ENCOUNTER — CLINICAL SUPPORT (OUTPATIENT)
Dept: REHABILITATION | Facility: HOSPITAL | Age: 33
End: 2023-09-12
Payer: MEDICAID

## 2023-09-12 DIAGNOSIS — M79.641 RIGHT HAND PAIN: Primary | ICD-10-CM

## 2023-09-12 PROCEDURE — 97530 THERAPEUTIC ACTIVITIES: CPT | Mod: PN

## 2023-09-19 ENCOUNTER — DOCUMENTATION ONLY (OUTPATIENT)
Dept: REHABILITATION | Facility: HOSPITAL | Age: 33
End: 2023-09-19
Payer: MEDICAID

## 2023-09-19 NOTE — PROGRESS NOTES
Patient arrived to therapy with a cast on (Right) wrist.  Therapy options were discussed.   Patient opted to hold off on therapy and continue Home Exercise Program until cast removal.  Patient's next scheduled therapy appointment is 10/13/2023 ( after follow up with doctor on 10/12/2023) . Reviewed Home Exercise Program for finger range of motion and gripping stress ball.  Patient demo understanding of above.

## 2023-09-24 ENCOUNTER — HOSPITAL ENCOUNTER (EMERGENCY)
Facility: HOSPITAL | Age: 33
Discharge: HOME | End: 2023-09-24
Attending: EMERGENCY MEDICINE
Payer: MEDICAID

## 2023-09-24 VITALS
TEMPERATURE: 98 F | BODY MASS INDEX: 40.48 KG/M2 | RESPIRATION RATE: 15 BRPM | DIASTOLIC BLOOD PRESSURE: 98 MMHG | WEIGHT: 220 LBS | OXYGEN SATURATION: 98 % | HEIGHT: 62 IN | SYSTOLIC BLOOD PRESSURE: 140 MMHG | HEART RATE: 89 BPM

## 2023-09-24 DIAGNOSIS — B34.9 VIRAL SYNDROME: Primary | ICD-10-CM

## 2023-09-24 LAB
GROUP A STREP, MOLECULAR: NEGATIVE
INFLUENZA A, MOLECULAR: NEGATIVE
INFLUENZA B, MOLECULAR: NEGATIVE
SARS-COV-2 RDRP RESP QL NAA+PROBE: NEGATIVE
SPECIMEN SOURCE: NORMAL

## 2023-09-24 PROCEDURE — 25000003 PHARM REV CODE 250: Performed by: EMERGENCY MEDICINE

## 2023-09-24 PROCEDURE — 87651 STREP A DNA AMP PROBE: CPT | Performed by: EMERGENCY MEDICINE

## 2023-09-24 PROCEDURE — 99283 EMERGENCY DEPT VISIT LOW MDM: CPT

## 2023-09-24 PROCEDURE — U0002 COVID-19 LAB TEST NON-CDC: HCPCS | Performed by: EMERGENCY MEDICINE

## 2023-09-24 PROCEDURE — 87502 INFLUENZA DNA AMP PROBE: CPT | Performed by: EMERGENCY MEDICINE

## 2023-09-24 RX ORDER — IBUPROFEN 600 MG/1
600 TABLET ORAL
Status: COMPLETED | OUTPATIENT
Start: 2023-09-24 | End: 2023-09-24

## 2023-09-24 RX ORDER — BENZONATATE 100 MG/1
200 CAPSULE ORAL 3 TIMES DAILY PRN
Qty: 15 CAPSULE | Refills: 0 | Status: SHIPPED | OUTPATIENT
Start: 2023-09-24 | End: 2024-02-05

## 2023-09-24 RX ORDER — ACETAMINOPHEN 500 MG
1000 TABLET ORAL
Status: COMPLETED | OUTPATIENT
Start: 2023-09-24 | End: 2023-09-24

## 2023-09-24 RX ADMIN — ACETAMINOPHEN 1000 MG: 500 TABLET ORAL at 02:09

## 2023-09-24 RX ADMIN — IBUPROFEN 600 MG: 600 TABLET ORAL at 02:09

## 2023-09-24 NOTE — ED PROVIDER NOTES
Encounter Date: 2023       History     Chief Complaint   Patient presents with    Sore Throat     Began tonight      Cough     Began tonight, dry       Chief complaint: Cough and congestion    HPI:  33-year-old female presents with a 1 day history of cough, congestion, sore throat, malaise and myalgias.  She is no headache or neck stiffness and denies any shortness of breath or chest pain.  Past medical history is significant for diabetes and kidney stones.      Review of patient's allergies indicates:   Allergen Reactions    Keflex [cephalexin]      rash    Latex, natural rubber Hives     Past Medical History:   Diagnosis Date    Diabetes mellitus     Renal disorder     kidney stone     Past Surgical History:   Procedure Laterality Date     SECTION      x 2    VULVECTOMY Left 2022    Procedure: VULVECTOMY, EXCISION OF VULVAR MASS AND OTHER INDICATED PROCEDURES;  Surgeon: Miguel Ángel Arrington MD;  Location: [facility];  Service: OB/GYN;  Laterality: Left;    WRIST ARTHROSCOPY W/ TRIANGULAR FIBROCARTILAGE REPAIR       Family History   Problem Relation Age of Onset    Cancer Maternal Grandmother     Heart disease Maternal Grandmother     Leukemia Maternal Grandmother     Hyperlipidemia Father     Hypertension Father     Diabetes Father     Breast cancer Maternal Aunt     Ovarian cancer Maternal Aunt      Social History     Tobacco Use    Smoking status: Never    Smokeless tobacco: Never   Substance Use Topics    Alcohol use: Yes     Comment: occasionally    Drug use: No     Review of Systems   Constitutional:  Positive for activity change, appetite change and fatigue. Negative for chills and fever.   HENT:  Positive for congestion and sore throat.    Eyes:  Negative for visual disturbance.   Respiratory:  Positive for cough. Negative for apnea and shortness of breath.    Cardiovascular:  Negative for chest pain and palpitations.   Gastrointestinal:  Negative for abdominal distention and abdominal pain.    Genitourinary:  Negative for difficulty urinating.   Musculoskeletal:  Positive for myalgias. Negative for neck pain.   Skin:  Negative for pallor and rash.   Neurological:  Negative for headaches.   Hematological:  Does not bruise/bleed easily.   Psychiatric/Behavioral:  Negative for agitation.        Physical Exam     Initial Vitals [09/24/23 0236]   BP Pulse Resp Temp SpO2   (!) 140/98 89 15 97.6 °F (36.4 °C) 98 %      MAP       --         Physical Exam    Nursing note and vitals reviewed.  Constitutional: She appears well-developed and well-nourished.   HENT:   Head: Normocephalic and atraumatic.   Nose: Nose normal.   Mouth/Throat: Oropharynx is clear and moist. No oropharyngeal exudate.   Eyes: Conjunctivae are normal.   Neck: Neck supple.   Normal range of motion.  Cardiovascular:  Normal rate, regular rhythm and normal heart sounds.     Exam reveals no gallop and no friction rub.       No murmur heard.  Pulmonary/Chest: Effort normal and breath sounds normal. No respiratory distress. She has no wheezes. She has no rhonchi. She has no rales.   Abdominal: Abdomen is soft. She exhibits no distension. There is no abdominal tenderness.   Musculoskeletal:         General: Normal range of motion.      Cervical back: Normal range of motion and neck supple.     Lymphadenopathy:     She has no cervical adenopathy.   Neurological: She is alert and oriented to person, place, and time.   Skin: Skin is warm and dry. No erythema.   Psychiatric: She has a normal mood and affect.         ED Course   Procedures  Labs Reviewed   GROUP A STREP, MOLECULAR   INFLUENZA A & B BY MOLECULAR   SARS-COV-2 RNA AMPLIFICATION, QUAL          Imaging Results    None          Medications   acetaminophen tablet 1,000 mg (1,000 mg Oral Given 9/24/23 0258)   ibuprofen tablet 600 mg (600 mg Oral Given 9/24/23 0259)     Medical Decision Making  33-year-old female presents with a 1 day history of cough, congestion, sore throat and malaise.  The  symptoms are consistent with a viral syndrome.  COVID, influenza and strep are negative.    Risk  OTC drugs.  Prescription drug management.                               Clinical Impression:   Final diagnoses:  [B34.9] Viral syndrome (Primary)        ED Disposition Condition    Discharge Stable          ED Prescriptions       Medication Sig Dispense Start Date End Date Auth. Provider    benzonatate (TESSALON) 100 MG capsule Take 2 capsules (200 mg total) by mouth 3 (three) times daily as needed for Cough. 15 capsule 9/24/2023 -- Naveen Haider III, MD          Follow-up Information       Follow up With Specialties Details Why Contact Info    Layo Reis III, PA-C Internal Medicine In 1 week  [address] [facility] [address]  [phone]               Naveen Haider III, MD  09/24/23 2316

## 2023-09-25 ENCOUNTER — PATIENT OUTREACH (OUTPATIENT)
Dept: ADMINISTRATIVE | Facility: HOSPITAL | Age: 33
End: 2023-09-25
Payer: MEDICAID

## 2023-10-07 ENCOUNTER — OFFICE VISIT (OUTPATIENT)
Dept: URGENT CARE | Facility: CLINIC | Age: 33
End: 2023-10-07
Payer: MEDICAID

## 2023-10-07 VITALS
TEMPERATURE: 97 F | WEIGHT: 226 LBS | OXYGEN SATURATION: 98 % | HEART RATE: 91 BPM | DIASTOLIC BLOOD PRESSURE: 91 MMHG | SYSTOLIC BLOOD PRESSURE: 138 MMHG | RESPIRATION RATE: 20 BRPM | HEIGHT: 62 IN | BODY MASS INDEX: 41.59 KG/M2

## 2023-10-07 DIAGNOSIS — R05.9 COUGH, UNSPECIFIED TYPE: Primary | ICD-10-CM

## 2023-10-07 PROCEDURE — 99214 PR OFFICE/OUTPT VISIT, EST, LEVL IV, 30-39 MIN: ICD-10-PCS | Mod: S$GLB,,, | Performed by: NURSE PRACTITIONER

## 2023-10-07 PROCEDURE — 99214 OFFICE O/P EST MOD 30 MIN: CPT | Mod: S$GLB,,, | Performed by: NURSE PRACTITIONER

## 2023-10-07 RX ORDER — PREDNISONE 10 MG/1
TABLET ORAL
Qty: 10 TABLET | Refills: 0 | Status: SHIPPED | OUTPATIENT
Start: 2023-10-07 | End: 2024-02-05

## 2023-10-07 RX ORDER — PROMETHAZINE HYDROCHLORIDE AND DEXTROMETHORPHAN HYDROBROMIDE 6.25; 15 MG/5ML; MG/5ML
5 SYRUP ORAL 4 TIMES DAILY PRN
Qty: 120 ML | Refills: 0 | Status: SHIPPED | OUTPATIENT
Start: 2023-10-07 | End: 2023-10-14

## 2023-10-07 RX ORDER — AZITHROMYCIN 250 MG/1
TABLET, FILM COATED ORAL
Qty: 6 TABLET | Refills: 0 | Status: SHIPPED | OUTPATIENT
Start: 2023-10-07 | End: 2024-02-05

## 2023-10-07 NOTE — PROGRESS NOTES
"Subjective:      Patient ID: Patriica James is a 33 y.o. female.    Vitals:  height is 5' 2" (1.575 m) and weight is 102.5 kg (226 lb). Her oral temperature is 97.1 °F (36.2 °C). Her blood pressure is 138/91 (abnormal) and her pulse is 91. Her respiration is 20 and oxygen saturation is 98%.     Chief Complaint: Cough    Pt states "she has been having a cough for 2 weeks. She is now having pain in her ribs."Cough and cold symptoms.  Symptoms started about 2 weeks ago.  Last couple of days she developed rib pain.  Denies rash.  Pain present bilateral.  Does not interfere with breathing.    Cough  This is a new problem. The current episode started 1 to 4 weeks ago (2 weeks ago). Associated symptoms include chest pain and headaches. Pertinent negatives include no chills, fever or shortness of breath.       Constitution: Negative for chills, fatigue and fever.   Cardiovascular:  Positive for chest pain. Negative for sob on exertion.   Respiratory:  Positive for cough. Negative for shortness of breath.    Neurological:  Positive for headaches.      Objective:     Physical Exam   Neck: Neck supple.   Cardiovascular: Normal rate, regular rhythm, normal heart sounds and normal pulses.   Pulmonary/Chest: Effort normal and breath sounds normal.         Comments: Chest wall tenderness to palpation consistent with costochondritis.    Abdominal: Normal appearance. Soft. flat abdomen   Neurological: She is alert.   Vitals reviewed.      Assessment:     1. Cough, unspecified type        Plan:       Cough, unspecified type    Other orders  -     azithromycin (Z-CHAVO) 250 MG tablet; Take 2 tablets by mouth on day 1; Take 1 tablet by mouth on days 2-5  Dispense: 6 tablet; Refill: 0  -     predniSONE (DELTASONE) 10 MG tablet; 4 by mouth once today, then 3 by mouth tomorrow, then 2 by mouth on day 3, then one by mouth on the last day.  Dispense: 10 tablet; Refill: 0  -     promethazine-dextromethorphan (PROMETHAZINE-DM) 6.25-15 " mg/5 mL Syrp; Take 5 mLs by mouth 4 (four) times daily as needed (cough).  Dispense: 120 mL; Refill: 0                  Costochondritis   Z-Yeison prednisone cough med   Discussed risk of somnolence do not take medication and drive or operate machinery.  Do not participate in activities that require mental alertness.

## 2023-10-10 NOTE — PROGRESS NOTES
"                                    Occupational Therapy Progress Note       Date: 10/13/2023  Name: Patricia James  Clinic Number: [id number]    Therapy Diagnosis: M65.841 (ICD-10-CM) - Stenosing tenosynovitis of finger of right hand  Encounter Diagnosis   Name Primary?    Right hand pain Yes     Physician: Dr. Omega Fontanez;  Dr. Josee Gordon    Physician Orders: M65.841 (ICD-10-CM) - Stenosing tenosynovitis of finger of right hand; evaluate and treat;  Paraffin, Ultrasound, slow progressive strengthening, progressive resistive exercises, range of motion, deep heat, massage, dry needling   Medical Diagnosis: M65.841 (ICD-10-CM) - Stenosing tenosynovitis of finger of right hand; (Right) hand pain  Surgical Procedure and Date: Not Applicable     Insurance Authorization Period Expiration: 07/19/2023-11/25/2023  Plan of Care Certification Period: 10/13/2023-01/13/2024  Date of Return to MD: 10/17/2023 ( CT scan) ; 11/16/2023 (Dr. Josee Gordon follow up)     Evaluation FOTO: 07/19/2023 = 52%  Reassessment FOTO: 08/16/2023 = 43%  Reassessment FOTO: 10/13/2203 = 52%    Visit # / Visits authorized: 14 / 24  Time In: 10:20  Time Out: 11:00    Total Billable Time: 40  minutes    Precautions:  Standard          Subjective     Patient reports: " I went to the doctor and she took the cast off yesterday and gave me an injection in my (Right) wrist.  She said to continue therapy and did not give me any restrictions.  She wants me to go for a CT scan of my wrist 10/17."     Pain: 7/10   Location of pain: (Right) wrist / forearm / hand     Objective    Patient seen by Occupational Therapy this session. Tx consisted of:          Therapeutic Activities to improve functional performance while increasing strength, endurance, range of motion,  and flexibility  x   40  minutes:    -Fluidotherapy to  (Right)   hand to increase blood flow, circulation, tissue elasticity, desensitization, sensory re-education and for pain management  " "x 8 minutes.      -Manual Therapy techniques to  (Right) wrist  including joint mobilizations, stretching, and Passive Range of Motion to increase joint mobility, range of motion  and for pain management  x  2 minutes   -Soft Tissue Mobilization to  (Right) hand into wrist and forearm  from distal to proximal to decrease edema and increase range of motion and functional abilities  x  2  minutes      - Velcro checkerboard (on wedge) with "dart throwing" pattern x 1 repetition  -NP  - 0.5# Dorsiflexion/Volar flexion (Supination /Pronation ), Radial Deviation / Ulnar Deviation x 10 repetitions  - Wrist roller coaster for Active Range of Motion  of wrist in all planes x 10 repetitions    - Weighted ball on tabletop for Dorsiflexion /Volar Flexion  stretches x 20 repetitions -NP   - Soccer ball (elbows flexed 90*) for Supination / Pronation stretch x 10 repetitions -NP  - Mini colored pegboard for Fine Motor Coordination and in-hand manipulation  (5 in-hand) x 20 pegs  -NP        -Ultrasound applied to radial aspect of (Right) wrist / forearm  on  3.0 Mhz with 0.7 w/cm2 @ 50 % duty cycle (Biofreeze)  to decrease pain and inflammation and increase tissue elasticity x 8 minutes.       Applied ROCKtape to dorsal and volar aspect of (Right) hand / wrist into forearm in "through finger" pattern for edema management and  circumferential  around wrist with tension on ulnar aspect.   Reviewed wearing schedule ( 3-4 days) , precautions, and removal ( soap and water).  Patient demo understanding of above.   -NP      -NP = Not Performed         Initiate in future therapy sessions:       - RED digiflex for isolated x 10 repetitions;  GREEN digiflex for composite digital flexion x 10 repetitions   - RED Theraputty with PVC for "cookie cutting" and medicine top simulating opening containers x 15 repetitions   - RED Theraputty for pulling x 10 Repetitions   - 25# Progressive Hand Gripper (black spring) for composite  x 10 " "repetitions  - Wooden pegboard with RED clothespins with 3PT pinch x 2 repetitions  - YELLOW digiweb for composite digital Extension x 10 repetitions   - RED digiweb for intrinsics x 15 repetitions  - Wrist wheel for Supination / Pronation  x 10 repetitions     - wrist wheel with Tband    -  #### dowel for Supination /Pronation  x 20 repetitions   - Low load prolonged stretches for Dorsiflexion /Volar Flexion with ### leg weight ( on wedge) x 30 seconds each x 2 repetitions   - ### Flexbar for Supination /Pronation  and Dorsiflexion /Volar Flexion x 20 repetitions    - ### Wrist exerstick in standing for Dorsiflexion / Volar Flexion  x 3 repetitions      Patient reassessed as follows:    Edema:   centimeters (Right) / (Left)  Metacarpals:  20.9 (-0.1)  / 20.9  Proximal Wrist Crease:  17.5 (+0.1)  / 16.8  2" prox to Proximal Wrist Crease: 18.6 (-0.0)   /  18.1             Active Range of Motion: hand   Patient demo (Bilateral) composite digital flexion actively, and able to oppose (Bilateral) thumbs to base of 5th digit                                       Active Range of Motion: wrist                         (Right)   //  (Left)    Dorsal Flexion /Volar Flexion:  45 (+5)  / 45 (+0)   //  60/70  Radial Deviation /Ulnar Deviation:  15 (+2)  / 23 (+3)   //  15/35  Supination / Pronation:  85 (+10)  / 85    //  90/85    Passive Range of Motion: wrist                         (Right)       Dorsal Flexion /Volar Flexion : 55 (+5)  / 65 (+10)   Radial Deviation /Ulnar Deviation:  20 (+4)  / 28 (+3)   Supination / Pronation:  90 (+10)  / 85                    Manual Muscle Test:  Wrist        (Right)    Dorsal Flexion:  3/5    Volar Flexion:  3/5    Radial Deviation: 3/5    Ulnar Deviation: 3/5         Strength: (AGUILAR Dynamometer in pounds),Position II    (Right):  35 (+15)   (Left):  60    Pinch Strength: (Pinch Gauge in pounds)                (Right) /  (Left)  Lateral Pinch:  9 (-2)   / 16  3 Point Pinch:  8 (-2)   " "/ 17  2 Point Pinch:  7 (-3)   / 9        Assessment     Patient will continue to benefit from skilled Occupational Therapy intervention to increase functional abilities, range of motion, and strength and pain control.  Patient. demonstrated proper understanding of each exercise.  Patient continues to require verbal and tactile cues for throughout therapy session to maintain position and prevent compensation.   Patient continues to be limited in functional and leisurely pursuits. Pain limits patient's participation in activities of daily living.  Patient is not able to carryout necessary vocational tasks.   Patient's spiritual, cultural and educational needs considered and patient agreeable to plan of care and goals.  Patient is making good progress towards established goals.  Patient tolerated exercises / activities within pain tolerance.   Reviewed Home Exercise Program with patient., see EPIC under "patient instructions" for provided exercises, activity modifications and limitations, modalities for home pain management.  Patient. demo understanding of above.    Patient. tolerated Fluidotherapy & Ultrasound  with no irritation.        Patient demo decreased edema (Left) metacarpals, increased edema Proximal Wrist Crease and remained the same 2" prox. To Proximal Wrist Crease;  increased Active range of motion for (Right) wrist Dorsal Flexion, Radial Deviation, Ulnar Deviation ; remained the same for Active range of motion Volar Flexion ;  increased Passive range of motion for Dorsal Flexion and Volar Flexion, Radial Deviation, Ulnar Deviation, Supination;  remained the same for Manual Muscle Testing (Right) wrist Dorsal Flexion, Volar Flexion, Radial Deviation, and Ulnar Deviation;  increased  strength; decreased Lateral Pinch, 3 Point Pinch, and 2 Point Pinch strength     New/Revised Goals: Continue Plan Of Care   Goals:  1)   Patient to be Independent with Home exercise program and modalities for pain " management by 1 week.  (MET)   2)   Patient will report   5/10 pain on average with activity to assist with exercises by 6-8 weeks.  (Revised 10/13/2023)   3)   Patient will increase range of Motion by 3-5 degrees to increase functional use for activities of daily living by 6-8 weeks.  ( In Progress)   4)   Patient will increase  strength 3-5 pounds to open containers by 6-8 weeks.  ( In Progress)   5)   Patient will increase pinch by 1-3 pounds for buttoning by 6-8 weeks.  ( In Progress)   6)   Patient will decrease edema by 0.1-0.3 millimeters  to increase joint mobility /flexibility by 6-8 weeks. (In Progress)     Plan      Continue 2x week during the 90 day certification period  10/13/2023  to 01/13/2024   with established Plan Of Care in pursuit of Occupational Therapy goals.      Aleah Eastman OT        I certify the need for these services furnished under this plan of treatment and while under my care    ____________________________________                         __________________  Physician/Referring Practitioner                                               Date of Signature

## 2023-10-13 ENCOUNTER — CLINICAL SUPPORT (OUTPATIENT)
Dept: REHABILITATION | Facility: HOSPITAL | Age: 33
End: 2023-10-13
Payer: MEDICAID

## 2023-10-13 DIAGNOSIS — M79.641 RIGHT HAND PAIN: Primary | ICD-10-CM

## 2023-10-13 PROCEDURE — 97530 THERAPEUTIC ACTIVITIES: CPT | Mod: PN

## 2023-10-16 NOTE — PROGRESS NOTES
"                                    Occupational Therapy Daily Treatment Note       Date: 10/18/2023  Name: Patricia James  Clinic Number: [id number]    Therapy Diagnosis: M65.841 (ICD-10-CM) - Stenosing tenosynovitis of finger of right hand  Encounter Diagnosis   Name Primary?    Right hand pain Yes     Physician: Dr. Omega Fontanez;  Dr. Josee Gordon      Physician Orders: M65.841 (ICD-10-CM) - Stenosing tenosynovitis of finger of right hand; evaluate and treat;  Paraffin, Ultrasound, slow progressive strengthening, progressive resistive exercises, range of motion, deep heat, massage, dry needling   Medical Diagnosis: M65.841 (ICD-10-CM) - Stenosing tenosynovitis of finger of right hand; (Right) hand pain  Surgical Procedure and Date: Not Applicable     Insurance Authorization Period Expiration: 07/19/2023-11/25/2023  Plan of Care Certification Period: 10/13/2023-01/13/2024  Date of Return to MD: 10/17/2023 ( CT scan) ; 11/16/2023 (Dr. Josee Gordon follow up)     Evaluation FOTO: 07/19/2023 = 52%  Reassessment FOTO: 08/16/2023 = 43%  Reassessment FOTO: 10/13/2203 = 52%    Visit # / Visits authorized: 15 / 24  Time In: 10:15  Time Out: 10:55  Total Billable Time: 40  minutes    Precautions:  Standard          Subjective     Patient reports: " I was having some popping in my wrist this morning at work.  I missed Monday because my child was sick. The pain is livable for now."     Pain: 6/10   Location of pain: (Right) wrist / forearm / hand     Objective    Patient seen by Occupational Therapy this session. Tx consisted of:          Therapeutic Activities to improve functional performance while increasing strength, endurance, range of motion,  and flexibility  x   40  minutes:    -Fluidotherapy to  (Right)   hand to increase blood flow, circulation, tissue elasticity, desensitization, sensory re-education and for pain management  x 8 minutes.      -Manual Therapy techniques to  (Right) wrist  including joint " "mobilizations, stretching, and Passive Range of Motion to increase joint mobility, range of motion  and for pain management  x  2 minutes   -Soft Tissue Mobilization to  (Right) hand into wrist and forearm  from distal to proximal to decrease edema and increase range of motion and functional abilities  x  2  minutes      - Velcro checkerboard (on wedge) with "dart throwing" pattern x 1 repetition  -NP  - 0.5# Dorsiflexion/Volar flexion (Supination /Pronation ), Radial Deviation / Ulnar Deviation x 10 repetitions  - Wrist roller coaster for Active Range of Motion  of wrist in all planes x 10 repetitions   - RED digiflex for isolated x 10 repetitions;  GREEN digiflex for composite digital flexion x 10 repetitions   - RED Theraputty with PVC for "cookie cutting" and medicine top simulating opening containers x 15 repetitions   - RED Theraputty for pulling x 10 Repetitions   - 25# Progressive Hand Gripper (black spring) for composite  x 20 repetitions  - Wooden pegboard with RED clothespins with 3PT pinch x 2 repetitions  - YELLOW digiweb for composite digital Extension x 10 repetitions   - RED digiweb for intrinsics x 15 repetitions  - Wrist wheel for Supination / Pronation  x 10 repetitions   - Weighted ball on tabletop for Dorsiflexion /Volar Flexion  stretches x 20 repetitions -NP   - Soccer ball (elbows flexed 90*) for Supination / Pronation stretch x 10 repetitions -NP  - Mini colored pegboard for Fine Motor Coordination and in-hand manipulation  (5 in-hand) x 20 pegs  -NP        -Ultrasound applied to volar aspect of (Right) wrist / forearm  on  3.0 Mhz with 0.7 w/cm2 @ 50 % duty cycle (Biofreeze)  to decrease pain and inflammation and increase tissue elasticity x 8 minutes.       Applied ROCKtape to dorsal and volar aspect of (Right) hand / wrist into forearm in "through finger" pattern for edema management and  circumferential  around wrist with tension on ulnar aspect.   Reviewed wearing schedule ( 3-4 " "days) , precautions, and removal ( soap and water).  Patient demo understanding of above.   -NP      -NP = Not Performed         Initiate in future therapy sessions:         - wrist wheel with Tband    -  #### dowel for Supination /Pronation  x 20 repetitions   - Low load prolonged stretches for Dorsiflexion /Volar Flexion with ### leg weight ( on wedge) x 30 seconds each x 2 repetitions   - ### Flexbar for Supination /Pronation  and Dorsiflexion /Volar Flexion x 20 repetitions    - ### Wrist exerstick in standing for Dorsiflexion / Volar Flexion  x 3 repetitions              Assessment     Patient will continue to benefit from skilled Occupational Therapy intervention to increase functional abilities, range of motion, and strength and pain control.  Patient. demonstrated proper understanding of each exercise.  Patient continues to require verbal and tactile cues for throughout therapy session to maintain position and prevent compensation.   Patient continues to be limited in functional and leisurely pursuits. Pain limits patient's participation in activities of daily living.  Patient is not able to carryout necessary vocational tasks.   Patient's spiritual, cultural and educational needs considered and patient agreeable to plan of care and goals.  Patient is making good progress towards established goals.  Patient tolerated exercises / activities within pain tolerance.   Reviewed Home Exercise Program with patient., see EPIC under "patient instructions" for provided exercises, activity modifications and limitations, modalities for home pain management.  Patient. demo understanding of above.    Patient. tolerated Fluidotherapy & Ultrasound  with no irritation.     Patient tolerated increase in repetitions during Progressive Hand Gripper for composite  with no reported pain.      New/Revised Goals: Continue Plan Of Care   Goals:  1)   Patient to be Independent with Home exercise program and modalities for pain " management by 1 week.  (MET)   2)   Patient will report   5/10 pain on average with activity to assist with exercises by 6-8 weeks.  (Revised 10/13/2023)   3)   Patient will increase range of Motion by 3-5 degrees to increase functional use for activities of daily living by 6-8 weeks.  ( In Progress)   4)   Patient will increase  strength 3-5 pounds to open containers by 6-8 weeks.  ( In Progress)   5)   Patient will increase pinch by 1-3 pounds for buttoning by 6-8 weeks.  ( In Progress)   6)   Patient will decrease edema by 0.1-0.3 millimeters  to increase joint mobility /flexibility by 6-8 weeks. (In Progress)     Plan      Continue 2x week during the 90 day certification period  10/13/2023  to 01/13/2024   with established Plan Of Care in pursuit of Occupational Therapy goals.      Aleah Eastman, OT

## 2023-10-18 ENCOUNTER — CLINICAL SUPPORT (OUTPATIENT)
Dept: REHABILITATION | Facility: HOSPITAL | Age: 33
End: 2023-10-18
Payer: MEDICAID

## 2023-10-18 DIAGNOSIS — M79.641 RIGHT HAND PAIN: Primary | ICD-10-CM

## 2023-10-18 PROCEDURE — 97530 THERAPEUTIC ACTIVITIES: CPT | Mod: PN

## 2023-10-18 NOTE — PROGRESS NOTES
"                                    Occupational Therapy Daily Treatment Note       Date: 10/25/2023  Name: Patricia James  Clinic Number: [id number]    Therapy Diagnosis: M65.841 (ICD-10-CM) - Stenosing tenosynovitis of finger of right hand  Encounter Diagnosis   Name Primary?    Right hand pain Yes     Physician: Dr. Omega Fontanez;  Dr. Josee Gordon      Physician Orders: M65.841 (ICD-10-CM) - Stenosing tenosynovitis of finger of right hand; evaluate and treat;  Paraffin, Ultrasound, slow progressive strengthening, progressive resistive exercises, range of motion, deep heat, massage, dry needling   Medical Diagnosis: M65.841 (ICD-10-CM) - Stenosing tenosynovitis of finger of right hand; (Right) hand pain  Surgical Procedure and Date: Not Applicable     Insurance Authorization Period Expiration: 07/19/2023-11/25/2023  Plan of Care Certification Period: 10/13/2023-01/13/2024  Date of Return to MD: 10/17/2023 ( CT scan) ; 11/16/2023 (Dr. Josee Gordon follow up)     Evaluation FOTO: 07/19/2023 = 52%  Reassessment FOTO: 08/16/2023 = 43%  Reassessment FOTO: 10/13/2203 = 52%    Visit # / Visits authorized: 16 / 24  Time In: 10:15  Time Out: 10:55    Total Billable Time: 40  minutes    Precautions:  Standard          Subjective     Patient reports: " I am feeling better today.  My muscles feel sore just from using them."     Pain: 5/10   Location of pain: (Right) wrist / forearm / hand     Objective    Patient seen by Occupational Therapy this session. Tx consisted of:          Therapeutic Activities to improve functional performance while increasing strength, endurance, range of motion,  and flexibility  x   40  minutes:      -Ultrasound applied to dorsal aspect of (Right) wrist / forearm  on  3.0 Mhz with 0.7 w/cm2 @ 50 % duty cycle (Biofreeze)  to decrease pain and inflammation and increase tissue elasticity x 8 minutes.      -Fluidotherapy to  (Right)   hand to increase blood flow, circulation, tissue " "elasticity, desensitization, sensory re-education and for pain management  x 8 minutes.      -Manual Therapy techniques to  (Right) wrist  including joint mobilizations, stretching, and Passive Range of Motion to increase joint mobility, range of motion  and for pain management  x  2 minutes   -Soft Tissue Mobilization to  (Right) hand into wrist and forearm  from distal to proximal to decrease edema and increase range of motion and functional abilities  x  2  minutes      - Velcro checkerboard (on wedge) with "dart throwing" pattern x 1 repetition  -NP  - 0.5# Dorsiflexion/Volar flexion (Supination /Pronation ), Radial Deviation / Ulnar Deviation x 10 repetitions  - Wrist roller coaster for Active Range of Motion  of wrist in all planes x 10 repetitions   - RED digiflex for isolated x 10 repetitions;  GREEN digiflex for composite digital flexion x 10 repetitions   - RED Theraputty with PVC for "cookie cutting" and medicine top simulating opening containers x 15 repetitions   - RED Theraputty for pulling x 10 Repetitions   - 25# Progressive Hand Gripper (black spring) for composite  x 20 repetitions  - Wooden pegboard with RED clothespins with 3PT pinch x 2 repetitions  - YELLOW digiweb for composite digital Extension x 10 repetitions   - RED digiweb for intrinsics x 20 repetitions  - Wrist wheel for Supination / Pronation  x 10 repetitions   - Weighted ball on tabletop for Dorsiflexion /Volar Flexion  stretches x 20 repetitions -NP   - Soccer ball (elbows flexed 90*) for Supination / Pronation stretch x 10 repetitions -NP  - Mini colored pegboard for Fine Motor Coordination and in-hand manipulation  (5 in-hand) x 20 pegs  -NP       Applied ROCKtape to dorsal and volar aspect of (Right) hand / wrist into forearm in "through finger" pattern for edema management and  circumferential  around wrist with tension on ulnar aspect.   Reviewed wearing schedule ( 3-4 days) , precautions, and removal ( soap and water).  " "Patient demo understanding of above.   -NP      -NP = Not Performed         Initiate in future therapy sessions:         - wrist wheel with Tband    -  #### dowel for Supination /Pronation  x 20 repetitions   - Low load prolonged stretches for Dorsiflexion /Volar Flexion with ### leg weight ( on wedge) x 30 seconds each x 2 repetitions   - ### Flexbar for Supination /Pronation  and Dorsiflexion /Volar Flexion x 20 repetitions    - ### Wrist exerstick in standing for Dorsiflexion / Volar Flexion  x 3 repetitions              Assessment     Patient will continue to benefit from skilled Occupational Therapy intervention to increase functional abilities, range of motion, and strength and pain control.  Patient. demonstrated proper understanding of each exercise.  Patient continues to require verbal and tactile cues for throughout therapy session to maintain position and prevent compensation.   Patient continues to be limited in functional and leisurely pursuits. Pain limits patient's participation in activities of daily living.  Patient is not able to carryout necessary vocational tasks.   Patient's spiritual, cultural and educational needs considered and patient agreeable to plan of care and goals.  Patient is making good progress towards established goals.  Patient tolerated exercises / activities within pain tolerance.   Reviewed Home Exercise Program with patient., see EPIC under "patient instructions" for provided exercises, activity modifications and limitations, modalities for home pain management.  Patient. demo understanding of above.    Patient. tolerated Fluidotherapy & Ultrasound  with no irritation.     Patient tolerated increase in repetitions during digiweb for intrinsics with no reported pain.   Patient continues to demo fatigue during wooden pegboard with clothespins for 3 Point Pinch and digiflex for isolated digital flexion with small finger but able to complete.      New/Revised Goals: Continue Plan " Of Care   Goals:  1)   Patient to be Independent with Home exercise program and modalities for pain management by 1 week.  (MET)   2)   Patient will report   5/10 pain on average with activity to assist with exercises by 6-8 weeks.  (Revised 10/13/2023)   3)   Patient will increase range of Motion by 3-5 degrees to increase functional use for activities of daily living by 6-8 weeks.  ( In Progress)   4)   Patient will increase  strength 3-5 pounds to open containers by 6-8 weeks.  ( In Progress)   5)   Patient will increase pinch by 1-3 pounds for buttoning by 6-8 weeks.  ( In Progress)   6)   Patient will decrease edema by 0.1-0.3 millimeters  to increase joint mobility /flexibility by 6-8 weeks. (In Progress)     Plan      Continue 2x week during the 90 day certification period  10/13/2023  to 01/13/2024   with established Plan Of Care in pursuit of Occupational Therapy goals.      Aleah Eastman, OT

## 2023-10-23 DIAGNOSIS — R05.9 COUGH, UNSPECIFIED: Primary | ICD-10-CM

## 2023-10-25 ENCOUNTER — CLINICAL SUPPORT (OUTPATIENT)
Dept: REHABILITATION | Facility: HOSPITAL | Age: 33
End: 2023-10-25
Payer: MEDICAID

## 2023-10-25 DIAGNOSIS — M79.641 RIGHT HAND PAIN: Primary | ICD-10-CM

## 2023-10-25 PROCEDURE — 97530 THERAPEUTIC ACTIVITIES: CPT | Mod: PN

## 2023-10-25 NOTE — PROGRESS NOTES
"                                    Occupational Therapy Daily Treatment Note       Date: 10/27/2023  Name: Patricia James  Clinic Number: [id number]    Therapy Diagnosis: M65.841 (ICD-10-CM) - Stenosing tenosynovitis of finger of right hand  Encounter Diagnosis   Name Primary?    Right hand pain Yes     Physician: Dr. Omega Fontanez;  Dr. Josee Gordon      Physician Orders: M65.841 (ICD-10-CM) - Stenosing tenosynovitis of finger of right hand; evaluate and treat;  Paraffin, Ultrasound, slow progressive strengthening, progressive resistive exercises, range of motion, deep heat, massage, dry needling   Medical Diagnosis: M65.841 (ICD-10-CM) - Stenosing tenosynovitis of finger of right hand; (Right) hand pain  Surgical Procedure and Date: Not Applicable     Insurance Authorization Period Expiration: 07/19/2023-11/25/2023  Plan of Care Certification Period: 10/13/2023-01/13/2024  Date of Return to MD: 10/17/2023 ( CT scan) ; 11/16/2023 (Dr. Josee Gordon follow up)     Evaluation FOTO: 07/19/2023 = 52%  Reassessment FOTO: 08/16/2023 = 43%  Reassessment FOTO: 10/13/2203 = 52%    Visit # / Visits authorized: 17 / 24  Time In: 10:15  Time Out: 10:55    Total Billable Time: 40  minutes    Precautions:  Standard          Subjective     Patient reports: " I am feeling stiff today.  I don't know what's going on with it."      Pain: 7/10   Location of pain: (Right) wrist / forearm / hand     Objective    Patient seen by Occupational Therapy this session. Tx consisted of:          Therapeutic Activities to improve functional performance while increasing strength, endurance, range of motion,  and flexibility  x   40  minutes:      -Ultrasound applied to dorsal aspect of (Right) wrist / forearm  on  3.0 Mhz with 0.7 w/cm2 @ 50 % duty cycle (Biofreeze)  to decrease pain and inflammation and increase tissue elasticity x 8 minutes.      -Fluidotherapy to  (Right)   hand to increase blood flow, circulation, tissue elasticity, " "desensitization, sensory re-education and for pain management  x 8 minutes.      -Manual Therapy techniques to  (Right) wrist  including joint mobilizations, stretching, and Passive Range of Motion to increase joint mobility, range of motion  and for pain management  x  2 minutes   -Soft Tissue Mobilization to  (Right) hand into wrist and forearm  from distal to proximal to decrease edema and increase range of motion and functional abilities  x  2  minutes      - Velcro checkerboard (on wedge) with "dart throwing" pattern x 1 repetition  -NP  - 0.5# Dorsiflexion/Volar flexion (Supination /Pronation ), Radial Deviation / Ulnar Deviation x 10 repetitions -NP  - Wrist roller coaster for Active Range of Motion  of wrist in all planes x 10 repetitions   - RED digiflex for isolated x 10 repetitions;  GREEN digiflex for composite digital flexion x 10 repetitions   - 25# Progressive Hand Gripper (black spring) for composite  x 20 repetitions  - Wooden pegboard with RED clothespins with 3PT pinch x 2 repetitions  - YELLOW digiweb for composite digital Extension x 10 repetitions   - RED digiweb for intrinsics x 20 repetitions  - Wrist wheel for Supination / Pronation  x 10 repetitions  - YELLOW Flexbar for Supination /Pronation  and Dorsiflexion /Volar Flexion x 10 repetitions    - Weighted ball on tabletop for Dorsiflexion /Volar Flexion  stretches x 20 repetitions -NP  - Mini colored pegboard for Fine Motor Coordination and in-hand manipulation  (5 in-hand) x 20 pegs  -NP       Applied ROCKtape to dorsal and volar aspect of (Right) hand / wrist into forearm in "through finger" pattern for edema management and  circumferential  around wrist with tension on ulnar aspect.   Reviewed wearing schedule ( 3-4 days) , precautions, and removal ( soap and water).  Patient demo understanding of above.   -NP      -NP = Not Performed         Initiate in future therapy sessions:       - RED Theraputty with PVC for "cookie cutting" " "and medicine top simulating opening containers x 15 repetitions   - RED Theraputty for pulling x 10 Repetitions   - wrist wheel with Tband    -  #### dowel for Supination /Pronation  x 20 repetitions   - Low load prolonged stretches for Dorsiflexion /Volar Flexion with ### leg weight ( on wedge) x 30 seconds each x 2 repetitions   - ### Wrist exerstick in standing for Dorsiflexion / Volar Flexion  x 3 repetitions              Assessment     Patient will continue to benefit from skilled Occupational Therapy intervention to increase functional abilities, range of motion, and strength and pain control.  Patient. demonstrated proper understanding of each exercise.  Patient continues to require verbal and tactile cues for throughout therapy session to maintain position and prevent compensation.   Patient continues to be limited in functional and leisurely pursuits. Pain limits patient's participation in activities of daily living.  Patient is not able to carryout necessary vocational tasks.   Patient's spiritual, cultural and educational needs considered and patient agreeable to plan of care and goals.  Patient is making good progress towards established goals.  Patient tolerated exercises / activities within pain tolerance.   Reviewed Home Exercise Program with patient., see EPIC under "patient instructions" for provided exercises, activity modifications and limitations, modalities for home pain management.  Patient. demo understanding of above.    Patient. tolerated Fluidotherapy & Ultrasound  with no irritation.     Patient tolerated addition of Flexbar for Supination / Pronation , Dorsal Flexion, Volar Flexion with no reported pain.        New/Revised Goals: Continue Plan Of Care   Goals:  1)   Patient to be Independent with Home exercise program and modalities for pain management by 1 week.  (MET)   2)   Patient will report   5/10 pain on average with activity to assist with exercises by 6-8 weeks.  (Revised " 10/13/2023)   3)   Patient will increase range of Motion by 3-5 degrees to increase functional use for activities of daily living by 6-8 weeks.  ( In Progress)   4)   Patient will increase  strength 3-5 pounds to open containers by 6-8 weeks.  ( In Progress)   5)   Patient will increase pinch by 1-3 pounds for buttoning by 6-8 weeks.  ( In Progress)   6)   Patient will decrease edema by 0.1-0.3 millimeters  to increase joint mobility /flexibility by 6-8 weeks. (In Progress)     Plan      Continue 2x week during the 90 day certification period  10/13/2023  to 01/13/2024   with established Plan Of Care in pursuit of Occupational Therapy goals.      Aleah Eastman, OT

## 2023-10-26 ENCOUNTER — HOSPITAL ENCOUNTER (OUTPATIENT)
Dept: RADIOLOGY | Facility: HOSPITAL | Age: 33
Discharge: HOME | End: 2023-10-26
Payer: MEDICAID

## 2023-10-26 DIAGNOSIS — R05.9 COUGH, UNSPECIFIED: ICD-10-CM

## 2023-10-26 PROCEDURE — 71046 X-RAY EXAM CHEST 2 VIEWS: CPT | Mod: TC,PO

## 2023-10-27 ENCOUNTER — CLINICAL SUPPORT (OUTPATIENT)
Dept: REHABILITATION | Facility: HOSPITAL | Age: 33
End: 2023-10-27
Payer: MEDICAID

## 2023-10-27 DIAGNOSIS — M79.641 RIGHT HAND PAIN: Primary | ICD-10-CM

## 2023-10-27 PROCEDURE — 97530 THERAPEUTIC ACTIVITIES: CPT | Mod: PN

## 2023-10-31 NOTE — PROGRESS NOTES
"                                    Occupational Therapy Daily Treatment Note       Date: 11/2/2023  Name: Patricia James  Clinic Number: [id number]    Therapy Diagnosis: M65.841 (ICD-10-CM) - Stenosing tenosynovitis of finger of right hand  Encounter Diagnosis   Name Primary?    Right hand pain Yes     Physician: Dr. Omega Fontanez;  Dr. Josee Gordon      Physician Orders: M65.841 (ICD-10-CM) - Stenosing tenosynovitis of finger of right hand; evaluate and treat;  Paraffin, Ultrasound, slow progressive strengthening, progressive resistive exercises, range of motion, deep heat, massage, dry needling   Medical Diagnosis: M65.841 (ICD-10-CM) - Stenosing tenosynovitis of finger of right hand; (Right) hand pain  Surgical Procedure and Date: Not Applicable     Insurance Authorization Period Expiration: 07/19/2023-11/25/2023  Plan of Care Certification Period: 10/13/2023-01/13/2024  Date of Return to MD: 10/17/2023 ( CT scan) ; 11/16/2023 (Dr. Josee Gordon follow up)     Evaluation FOTO: 07/19/2023 = 52%  Reassessment FOTO: 08/16/2023 = 43%  Reassessment FOTO: 10/13/2203 = 52%    Visit # / Visits authorized: 18 / 24  Time In: 10:00  Time Out: 10:40    Total Billable Time: 40  minutes    Precautions:  Standard          Subjective     Patient reports: " I am feeling about the same in my hand. I feel swollen on the small finger side of my wrist."     Pain: 7/10   Location of pain: (Right) wrist / forearm / hand     Objective    Patient seen by Occupational Therapy this session. Tx consisted of:          Therapeutic Activities to improve functional performance while increasing strength, endurance, range of motion,  and flexibility  x   40  minutes:      -Ultrasound applied to dorsal aspect of (Right) wrist / forearm  on  3.0 Mhz with 0.7 w/cm2 @ 50 % duty cycle (Biofreeze)  to decrease pain and inflammation and increase tissue elasticity x 8 minutes.      -Fluidotherapy to  (Right)   hand to increase blood flow, " "circulation, tissue elasticity, desensitization, sensory re-education and for pain management  x 8 minutes.      -Manual Therapy techniques to  (Right) wrist  including joint mobilizations, stretching, and Passive Range of Motion to increase joint mobility, range of motion  and for pain management  x  2 minutes   -Soft Tissue Mobilization to  (Right) hand into wrist and forearm  from distal to proximal to decrease edema and increase range of motion and functional abilities  x  2  minutes      - Velcro checkerboard (on wedge) with "dart throwing" pattern x 1 repetition  -NP  - 0.5# Dorsiflexion/Volar flexion (Supination /Pronation ), Radial Deviation / Ulnar Deviation x 10 repetitions -NP  - Wrist roller coaster for Active Range of Motion  of wrist in all planes x 10 repetitions   - RED digiflex for isolated x 10 repetitions;  GREEN digiflex for composite digital flexion x 10 repetitions   - 35# Progressive Hand Gripper (black spring) for composite  x 20 repetitions  - Wooden pegboard with RED clothespins with 3PT pinch x 2 repetitions  - YELLOW digiweb for composite digital Extension x 10 repetitions   - RED digiweb for intrinsics x 20 repetitions  - Wrist wheel for Supination / Pronation  x 10 repetitions  - YELLOW Flexbar for Supination /Pronation  and Dorsiflexion /Volar Flexion x 10 repetitions    - Weighted ball on tabletop for Dorsiflexion /Volar Flexion  stretches x 20 repetitions -NP  - Mini colored pegboard for Fine Motor Coordination and in-hand manipulation  (5 in-hand) x 20 pegs  -NP    Provided patient with size "D" compression sleeve for (Right) forearm for edema management.  Patient educated on wearing schedule, precautions, and care.  Patient demo understanding of above.          Applied ROCKtape to dorsal and volar aspect of (Right) hand / wrist into forearm in "through finger" pattern for edema management and  circumferential  around wrist with tension on ulnar aspect.   Reviewed wearing " "schedule ( 3-4 days) , precautions, and removal ( soap and water).  Patient demo understanding of above.   -NP      -NP = Not Performed         Initiate in future therapy sessions:       - RED Theraputty with PVC for "cookie cutting" and medicine top simulating opening containers x 15 repetitions   - RED Theraputty for pulling x 10 Repetitions   - wrist wheel with Tband    -  #### dowel for Supination /Pronation  x 20 repetitions   - Low load prolonged stretches for Dorsiflexion /Volar Flexion with ### leg weight ( on wedge) x 30 seconds each x 2 repetitions   - ### Wrist exerstick in standing for Dorsiflexion / Volar Flexion  x 3 repetitions              Assessment     Patient will continue to benefit from skilled Occupational Therapy intervention to increase functional abilities, range of motion, and strength and pain control.  Patient. demonstrated proper understanding of each exercise.  Patient continues to require verbal and tactile cues for throughout therapy session to maintain position and prevent compensation.   Patient continues to be limited in functional and leisurely pursuits. Pain limits patient's participation in activities of daily living.  Patient is not able to carryout necessary vocational tasks.   Patient's spiritual, cultural and educational needs considered and patient agreeable to plan of care and goals.  Patient is making good progress towards established goals.  Patient tolerated exercises / activities within pain tolerance.   Reviewed Home Exercise Program with patient., see EPIC under "patient instructions" for provided exercises, activity modifications and limitations, modalities for home pain management.  Patient. demo understanding of above.    Patient. tolerated Fluidotherapy & Ultrasound  with no irritation.     Patient tolerated increase in resistance during Progressive Hand Gripper for composite  with no reported pain.        New/Revised Goals: Continue Plan Of Care   Goals:  1) "   Patient to be Independent with Home exercise program and modalities for pain management by 1 week.  (MET)   2)   Patient will report   5/10 pain on average with activity to assist with exercises by 6-8 weeks.  (Revised 10/13/2023)   3)   Patient will increase range of Motion by 3-5 degrees to increase functional use for activities of daily living by 6-8 weeks.  ( In Progress)   4)   Patient will increase  strength 3-5 pounds to open containers by 6-8 weeks.  ( In Progress)   5)   Patient will increase pinch by 1-3 pounds for buttoning by 6-8 weeks.  ( In Progress)   6)   Patient will decrease edema by 0.1-0.3 millimeters  to increase joint mobility /flexibility by 6-8 weeks. (In Progress)     Plan      Continue 2x week during the 90 day certification period  10/13/2023  to 01/13/2024   with established Plan Of Care in pursuit of Occupational Therapy goals.      Aleah Eastman, OT

## 2023-11-02 ENCOUNTER — CLINICAL SUPPORT (OUTPATIENT)
Dept: REHABILITATION | Facility: HOSPITAL | Age: 33
End: 2023-11-02
Payer: MEDICAID

## 2023-11-02 DIAGNOSIS — M79.641 RIGHT HAND PAIN: Primary | ICD-10-CM

## 2023-11-02 PROCEDURE — 97530 THERAPEUTIC ACTIVITIES: CPT | Mod: PN

## 2023-11-08 NOTE — PROGRESS NOTES
"                                    Occupational Therapy Progress Note       Date: 11/10/2023  Name: Patricia James  Clinic Number: [id number]    Therapy Diagnosis: M65.841 (ICD-10-CM) - Stenosing tenosynovitis of finger of right hand  Encounter Diagnosis   Name Primary?    Right hand pain Yes     Physician: Dr. Omega Fontanez;  Dr. Josee Gordon      Physician Orders: M65.841 (ICD-10-CM) - Stenosing tenosynovitis of finger of right hand; evaluate and treat;  Paraffin, Ultrasound, slow progressive strengthening, progressive resistive exercises, range of motion, deep heat, massage, dry needling   Medical Diagnosis: M65.841 (ICD-10-CM) - Stenosing tenosynovitis of finger of right hand; (Right) hand pain  Surgical Procedure and Date: Not Applicable     Insurance Authorization Period Expiration: 07/19/2023-11/25/2023  Plan of Care Certification Period: 10/13/2023-01/13/2024  Date of Return to MD: 10/17/2023 ( CT scan) ; 11/16/2023 (Dr. Josee Gordon follow up)     Evaluation FOTO: 07/19/2023 = 52%  Reassessment FOTO: 08/16/2023 = 43%  Reassessment FOTO: 10/13/2203 = 52%  Reassessment FOTO: 11/10/2023 = 59%    Visit # / Visits authorized: 19 / 24  Time In: 10:30  Time Out: 11:10    Total Billable Time: 40  minutes    Precautions:  Standard          Subjective     Patient reports: " I had to cancel my appointment on Wednesday because I couldn't move my hand.  I am in a lot of pain today.  The sleeve you gave me last time I have been wearing and it think its working."      Pain: 8/10   Location of pain: (Right) wrist / forearm / hand     Objective    Patient seen by Occupational Therapy this session. Tx consisted of:          Therapeutic Activities to improve functional performance while increasing strength, endurance, range of motion,  and flexibility  x   40  minutes:      -Ultrasound applied to  ulnar aspect of (Right) wrist / forearm  on  3.0 Mhz with 0.7 w/cm2 @ 50 % duty cycle (Biofreeze)  to decrease pain and " "inflammation and increase tissue elasticity x 8 minutes.      -Fluidotherapy to  (Right)   hand to increase blood flow, circulation, tissue elasticity, desensitization, sensory re-education and for pain management  x 8 minutes.      -Manual Therapy techniques to  (Right) wrist  including joint mobilizations, stretching, and Passive Range of Motion to increase joint mobility, range of motion  and for pain management  x  2 minutes   -Soft Tissue Mobilization to  (Right) hand into wrist and forearm  from distal to proximal to decrease edema and increase range of motion and functional abilities  x  2  minutes      - Velcro checkerboard (on wedge) with "dart throwing" pattern x 1 repetition  -NP  - 0.5# Dorsiflexion/Volar flexion (Supination /Pronation ), Radial Deviation / Ulnar Deviation x 10 repetitions -NP  - 35# Progressive Hand Gripper (black spring) for composite  x 20 repetitions  - Wooden pegboard with RED clothespins with 3PT pinch x 2 repetitions  - YELLOW Flexbar for Supination /Pronation  and Dorsiflexion /Volar Flexion x 10 repetitions    - Weighted ball on tabletop for Dorsiflexion /Volar Flexion  stretches x 20 repetitions -NP  - Mini colored pegboard for Fine Motor Coordination and in-hand manipulation  (5 in-hand) x 20 pegs  -NP         Applied ROCKtape to dorsal and volar aspect of (Right) hand / wrist into forearm in "through finger" pattern for edema management and  circumferential  around wrist with tension on ulnar aspect.   Reviewed wearing schedule ( 3-4 days) , precautions, and removal ( soap and water).  Patient demo understanding of above.   -NP      -NP = Not Performed         Initiate in future therapy sessions:     - Wrist roller coaster for Active Range of Motion  of wrist in all planes x 10 repetitions   - RED digiflex for isolated x 10 repetitions;  GREEN digiflex for composite digital flexion x 10 repetitions   - YELLOW digiweb for composite digital Extension x 10 repetitions   - " "RED digiweb for intrinsics x 20 repetitions  - Wrist wheel for Supination / Pronation  x 10 repetitions    - RED Theraputty with PVC for "cookie cutting" and medicine top simulating opening containers x 15 repetitions   - RED Theraputty for pulling x 10 Repetitions   - wrist wheel with Tband    -  #### dowel for Supination /Pronation  x 20 repetitions   - Low load prolonged stretches for Dorsiflexion /Volar Flexion with ### leg weight ( on wedge) x 30 seconds each x 2 repetitions   - ### Wrist exerstick in standing for Dorsiflexion / Volar Flexion  x 3 repetitions        Patient reassessed as follows:    Edema:   centimeters (Right) / (Left)  Metacarpals:  20.7 (-0.2)  / 20.9  Proximal Wrist Crease:  17.5 (-0.0)  / 16.8  2" prox to Proximal Wrist Crease: 18.4 (-0.2)   /  18.1              Active Range of Motion: hand   Patient demo (Bilateral) composite digital flexion actively, and able to oppose (Bilateral) thumbs to base of 5th digit                                       Active Range of Motion: wrist                         (Right)   //  (Left)    Dorsal Flexion /Volar Flexion:  50  (+5)  / 50 (+5)   //  60/70  Radial Deviation /Ulnar Deviation:  15   / 24 (+1)   //  15/35  Supination / Pronation:  90 (+5)  / 85    //  90/85    Passive Range of Motion: wrist                         (Right)       Dorsal Flexion /Volar Flexion : 65 (+10 )  / 70 (+5)   Radial Deviation /Ulnar Deviation:  20   / 30 (+2)   Supination / Pronation:  90 / 85                    Manual Muscle Test:  Wrist        (Right)    Dorsal Flexion:  3+/5  (+1)   Volar Flexion:  3+/5  (+1)  Radial Deviation: 3+/5   (+1)   Ulnar Deviation: 3+/5  (+1)        Strength: (AGUILAR Dynamometer in pounds),Position II    (Right):  40 (+5)   (Left):  60    Pinch Strength: (Pinch Gauge in pounds)                (Right) /  (Left)  Lateral Pinch:  12 (+3)   / 16  3 Point Pinch:  12 (+4)   / 17  2 Point Pinch:  8 (+1)   / 9          Assessment     Patient will " "continue to benefit from skilled Occupational Therapy intervention to increase functional abilities, range of motion, and strength and pain control.  Patient. demonstrated proper understanding of each exercise.  Patient continues to require verbal and tactile cues for throughout therapy session to maintain position and prevent compensation.   Patient continues to be limited in functional and leisurely pursuits. Pain limits patient's participation in activities of daily living.  Patient is not able to carryout necessary vocational tasks.   Patient's spiritual, cultural and educational needs considered and patient agreeable to plan of care and goals.  Patient is making good progress towards established goals.  Patient tolerated exercises / activities within pain tolerance.   Reviewed Home Exercise Program with patient., see EPIC under "patient instructions" for provided exercises, activity modifications and limitations, modalities for home pain management.  Patient. demo understanding of above.    Patient. tolerated Fluidotherapy & Ultrasound  with no irritation.      Patient demo decreased edema in (Right) metacarpals and 2" prox to PWC, and  remained the same at Proximal Wrist Crease; increased Active range of motion for (Right) wrist Dorsal Flexion, Volar Flexion,  Ulnar Deviation, and Supination  ; increased Passive range of motion for (Right) wrist Dorsal Flexion, Volar Flexion, Ulnar Deviation; increased Manual Muscle Testing for (Right) wrist Dorsal Flexion, Volar Flexion, Radial Deviation, and Ulnar Deviation; increased  strength , Lateral Pinch, 3 Point Pinch, and 2 Point Pinch strength        New/Revised Goals: Continue Plan Of Care   Goals:  1)   Patient to be Independent with Home exercise program and modalities for pain management by 1 week.  (MET)   2)   Patient will report   6/10 pain on average with activity to assist with exercises by 6-8 weeks.  (Revised 11/10/2023)   3)   Patient will increase " range of Motion by 3-5 degrees to increase functional use for activities of daily living by 6-8 weeks.  ( In Progress)   4)   Patient will increase  strength 3-5 pounds to open containers by 6-8 weeks.  ( In Progress)   5)   Patient will increase pinch by 1-3 pounds for buttoning by 6-8 weeks.  ( In Progress)   6)   Patient will decrease edema by 0.1-0.3 millimeters  to increase joint mobility /flexibility by 6-8 weeks. (In Progress)     Plan      Continue 2x week during the 90 day certification period  10/13/2023  to 01/13/2024   with established Plan Of Care in pursuit of Occupational Therapy goals.      Aleah Eastman OT          I certify the need for these services furnished under this plan of treatment and while under my care    ____________________________________                         __________________  Physician/Referring Practitioner                                               Date of Signature

## 2023-11-10 ENCOUNTER — CLINICAL SUPPORT (OUTPATIENT)
Dept: REHABILITATION | Facility: HOSPITAL | Age: 33
End: 2023-11-10
Payer: MEDICAID

## 2023-11-10 DIAGNOSIS — M79.641 RIGHT HAND PAIN: Primary | ICD-10-CM

## 2023-11-10 PROCEDURE — 97530 THERAPEUTIC ACTIVITIES: CPT | Mod: PN

## 2023-11-14 NOTE — PROGRESS NOTES
"                                    Occupational Therapy Daily Treatment Note       Date: 11/15/2023  Name: Patricia James  Clinic Number: [id number]    Therapy Diagnosis: M65.841 (ICD-10-CM) - Stenosing tenosynovitis of finger of right hand  Encounter Diagnosis   Name Primary?    Right hand pain Yes     Physician: Dr. Omega Fontanez;  Dr. Josee Gordon      Physician Orders: M65.841 (ICD-10-CM) - Stenosing tenosynovitis of finger of right hand; evaluate and treat;  Paraffin, Ultrasound, slow progressive strengthening, progressive resistive exercises, range of motion, deep heat, massage, dry needling   Medical Diagnosis: M65.841 (ICD-10-CM) - Stenosing tenosynovitis of finger of right hand; (Right) hand pain  Surgical Procedure and Date: Not Applicable     Insurance Authorization Period Expiration: 07/19/2023-11/25/2023  Plan of Care Certification Period: 10/13/2023-01/13/2024  Date of Return to MD: 10/17/2023 ( CT scan) ; 11/16/2023 (Dr. Josee Gordon follow up)     Evaluation FOTO: 07/19/2023 = 52%  Reassessment FOTO: 08/16/2023 = 43%  Reassessment FOTO: 10/13/2203 = 52%  Reassessment FOTO: 11/10/2023 = 59%    Visit # / Visits authorized: 19 / 24  Time In: 10:10  Time Out: 10:55    Total Billable Time: 40  minutes    Precautions:  Standard          Subjective     Patient reports: " I am feeling a little better in my hand today."     Pain: 5/10   Location of pain: (Right) wrist / forearm / hand     Objective    Patient seen by Occupational Therapy this session. Tx consisted of:          Therapeutic Activities to improve functional performance while increasing strength, endurance, range of motion,  and flexibility  x   40  minutes:      -Ultrasound applied to volar aspect of (Right) wrist / forearm  on  3.0 Mhz with 0.7 w/cm2 @ 50 % duty cycle (Biofreeze)  to decrease pain and inflammation and increase tissue elasticity x 8 minutes.      -Fluidotherapy to  (Right)   hand to increase blood flow, circulation, " "tissue elasticity, desensitization, sensory re-education and for pain management  x 8 minutes.      -Manual Therapy techniques to  (Right) wrist  including joint mobilizations, stretching, and Passive Range of Motion to increase joint mobility, range of motion  and for pain management  x  2 minutes   -Soft Tissue Mobilization to  (Right) hand into wrist and forearm  from distal to proximal to decrease edema and increase range of motion and functional abilities  x  2  minutes      - Velcro checkerboard (on wedge) with "dart throwing" pattern x 1 repetition  -NP  - 0.5# Dorsiflexion/Volar flexion (Supination /Pronation ), Radial Deviation / Ulnar Deviation x 10 repetitions -NP  - 35# Progressive Hand Gripper (black spring) for composite  x 20 repetitions  - Wooden pegboard with RED clothespins with 3PT pinch x 2 repetitions  - Wrist roller coaster for Active Range of Motion  of wrist in all planes x 10 repetitions   - RED digiflex for isolated x 10 repetitions;  GREEN digiflex for composite digital flexion x 10 repetitions   - YELLOW digiweb for composite digital Extension x 10 repetitions   - RED digiweb for intrinsics x 20 repetitions  - Wrist wheel with YELLOW Theraband for Supination / Pronation  x 10 repetitions  - YELLOW Flexbar for Supination /Pronation  and Dorsiflexion /Volar Flexion x 10 repetitions    - Weighted ball on tabletop for Dorsiflexion /Volar Flexion  stretches x 20 repetitions -NP  - Mini colored pegboard for Fine Motor Coordination and in-hand manipulation  (5 in-hand) x 20 pegs  -NP         Applied ROCKtape to dorsal and volar aspect of (Right) hand / wrist into forearm in "through finger" pattern for edema management and  circumferential  around wrist with tension on ulnar aspect.   Reviewed wearing schedule ( 3-4 days) , precautions, and removal ( soap and water).  Patient demo understanding of above.   -NP      -NP = Not Performed         Initiate in future therapy sessions:         - " "RED Theraputty with PVC for "cookie cutting" and medicine top simulating opening containers x 15 repetitions   - RED Theraputty for pulling x 10 Repetitions   - wrist wheel with Tband    -  #### dowel for Supination /Pronation  x 20 repetitions   - Low load prolonged stretches for Dorsiflexion /Volar Flexion with ### leg weight ( on wedge) x 30 seconds each x 2 repetitions   - ### Wrist exerstick in standing for Dorsiflexion / Volar Flexion  x 3 repetitions                Assessment     Patient will continue to benefit from skilled Occupational Therapy intervention to increase functional abilities, range of motion, and strength and pain control.  Patient. demonstrated proper understanding of each exercise.  Patient continues to require verbal and tactile cues for throughout therapy session to maintain position and prevent compensation.   Patient continues to be limited in functional and leisurely pursuits. Pain limits patient's participation in activities of daily living.  Patient is not able to carryout necessary vocational tasks.   Patient's spiritual, cultural and educational needs considered and patient agreeable to plan of care and goals.  Patient is making good progress towards established goals.  Patient tolerated exercises / activities within pain tolerance.   Reviewed Home Exercise Program with patient., see EPIC under "patient instructions" for provided exercises, activity modifications and limitations, modalities for home pain management.  Patient. demo understanding of above.    Patient. tolerated Fluidotherapy & Ultrasound  with no irritation.     Patient tolerated addition of Theraband during wrist wheel for Supination /Pronation with no reported pain but demo fatigue.      New/Revised Goals: Continue Plan Of Care   Goals:  1)   Patient to be Independent with Home exercise program and modalities for pain management by 1 week.  (MET)   2)   Patient will report   6/10 pain on average with activity to " assist with exercises by 6-8 weeks.  (Revised 11/10/2023)   3)   Patient will increase range of Motion by 3-5 degrees to increase functional use for activities of daily living by 6-8 weeks.  ( In Progress)   4)   Patient will increase  strength 3-5 pounds to open containers by 6-8 weeks.  ( In Progress)   5)   Patient will increase pinch by 1-3 pounds for buttoning by 6-8 weeks.  ( In Progress)   6)   Patient will decrease edema by 0.1-0.3 millimeters  to increase joint mobility /flexibility by 6-8 weeks. (In Progress)     Plan      Continue 2x week during the 90 day certification period  10/13/2023  to 01/13/2024   with established Plan Of Care in pursuit of Occupational Therapy goals.      Aleah Eastman, OT

## 2023-11-15 ENCOUNTER — CLINICAL SUPPORT (OUTPATIENT)
Dept: REHABILITATION | Facility: HOSPITAL | Age: 33
End: 2023-11-15
Payer: MEDICAID

## 2023-11-15 DIAGNOSIS — M79.641 RIGHT HAND PAIN: Primary | ICD-10-CM

## 2023-11-15 PROCEDURE — 97530 THERAPEUTIC ACTIVITIES: CPT | Mod: PN

## 2023-11-15 NOTE — PROGRESS NOTES
"                                  Occupational Therapy Discharge Summary       Date: 11/29/2023  Name: Patricia James  Clinic Number: [id number]    Therapy Diagnosis: M65.841 (ICD-10-CM) - Stenosing tenosynovitis of finger of right hand  Encounter Diagnosis   Name Primary?    Right hand pain Yes     Physician: Dr. Omega Fontanez;  Dr. Josee Gordon      Physician Orders: M65.841 (ICD-10-CM) - Stenosing tenosynovitis of finger of right hand; evaluate and treat;  Paraffin, Ultrasound, slow progressive strengthening, progressive resistive exercises, range of motion, deep heat, massage, dry needling   Medical Diagnosis: M65.841 (ICD-10-CM) - Stenosing tenosynovitis of finger of right hand; (Right) hand pain  Surgical Procedure and Date: Not Applicable     Insurance Authorization Period Expiration: 07/19/2023-11/25/2023  Plan of Care Certification Period: 10/13/2023-01/13/2024  Date of Return to MD: 10/17/2023 ( CT scan) ; 11/16/2023 (Dr. Josee Gordon follow up)     Evaluation FOTO: 07/19/2023 = 52%  Reassessment FOTO: 08/16/2023 = 43%  Reassessment FOTO: 10/13/2203 = 52%  Reassessment FOTO: 11/10/2023 = 59%  Discharge FOTO: 11/29/2023 = 54%    Visit # / Visits authorized: 20 / 24  Time In: 9:55  Time Out: 10:25     Total Billable Time: 30 minutes    Precautions:  Standard          Subjective     Patient reports: " I went to see the doctor and she wants to do surgery on 12/26/2023.  I think I want to discontinue therapy at this time."     Pain: 6/10   Location of pain: (Right) wrist / forearm / hand     Objective    Patient seen by Occupational Therapy this session. Tx consisted of:          Therapeutic Activities to decrease pain and reassess range of motion, edema, Manual Muscle Testing,  and pinch strength and functional abilities   x    30 minutes:      -Ultrasound applied to radial aspect of (Right) wrist / forearm  on  3.0 Mhz with 0.7 w/cm2 @ 50 % duty cycle (Biofreeze)  to decrease pain and inflammation " "and increase tissue elasticity x 8 minutes.            Patient reassessed as follows:    Edema:   centimeters (Right) / (Left)  Metacarpals:  21.0 (+0.3)  / 20.9  Proximal Wrist Crease:  17.0 (-0.5)  / 16.8  2" prox to Proximal Wrist Crease: 18.6 (+0.2)   /  18.1        Active Range of Motion: hand   Patient demo (Bilateral) composite digital flexion actively, and able to oppose (Bilateral) thumbs to base of 5th digit                                       Active Range of Motion: wrist                         (Right)   //  (Left)    Dorsal Flexion /Volar Flexion:  50  (+0)  / 50 (+0)   //  60/70  Radial Deviation /Ulnar Deviation:  15   / 29 (+5)   //  15/35  Supination / Pronation:  90  / 85    //  90/85    Manual Muscle Test:  Wrist        (Right)    Dorsal Flexion:  3+/5  (+0)   Volar Flexion:  3+/5  (+0)  Radial Deviation: 3+/5   (+0)   Ulnar Deviation: 3+/5  (+0)      Strength: (AGUILAR Dynamometer in pounds),Position II    (Right):  35 (-5)   (Left):  60    Pinch Strength: (Pinch Gauge in pounds)                (Right) /  (Left)  Lateral Pinch:  9 (-3)   / 16  3 Point Pinch:  13 (+1)   / 17  2 Point Pinch:  12 (+4)   / 9                 Assessment     Patient to be discharged home  from skilled outpatient Occupational Therapy at this time 2* to having surgery on (Right) arm on 12/26/2023.  Patient to follow up with doctor as needed. Patient continues to be limited in functional and leisurely pursuits. Pain limits patient's participation in activities of daily living.  Patient is not able to carryout necessary vocational tasks.   Patient's spiritual, cultural and educational needs considered and patient agreeable to plan of care and goals.   Reviewed Home Exercise Program with patient., see EPIC under "patient instructions" for provided exercises, activity modifications and limitations, modalities for home pain management.  Patient. demo understanding of above.    Patient. tolerated Ultrasound  with no " "irritation.     Patient met 2 / 6 established goals.  Patient demo increased edema in metacarpals and 2" prox. To Proximal Wrist Crease, decreased edema in (Right) Proximal Wrist Crease;   remained the same for (Right) wrist Dorsal Flexion, Volar Flexion;  increased Active range of motion for Ulnar Deviation;    remained the same for Manual Muscle Testing for (Right) wrist Dorsal Flexion, Volar Flexion, Radial Deviation, Ulnar Deviation; increased 3 Point Pinch and 2 Point Pinch  strength, decreased Lateral Pinch strength; decreased  strength.        Goals:  1)   Patient to be Independent with Home exercise program and modalities for pain management by 1 week.  (MET)   2)   Patient will report   6/10 pain on average with activity to assist with exercises by 6-8 weeks.  (MET)   3)   Patient will increase range of Motion by 3-5 degrees to increase functional use for activities of daily living by 6-8 weeks.  ( Not MET)   4)   Patient will increase  strength 3-5 pounds to open containers by 6-8 weeks.  ( Not MET)   5)   Patient will increase pinch by 1-3 pounds for buttoning by 6-8 weeks.  ( Not MET)   6)   Patient will decrease edema by 0.1-0.3 millimeters  to increase joint mobility /flexibility by 6-8 weeks. (Not MET)     Plan     Patient to be discharged home  from skilled outpatient Occupational Therapy at this time 2* to having surgery on (Right) arm on 12/26/2023.  Patient to follow up with doctor as needed.       Aleah Eastman, OT            "

## 2023-11-23 ENCOUNTER — HOSPITAL ENCOUNTER (EMERGENCY)
Facility: HOSPITAL | Age: 33
Discharge: HOME | End: 2023-11-24
Attending: EMERGENCY MEDICINE
Payer: MEDICAID

## 2023-11-23 DIAGNOSIS — S63.602A SPRAIN OF LEFT THUMB, UNSPECIFIED SITE OF DIGIT, INITIAL ENCOUNTER: ICD-10-CM

## 2023-11-23 DIAGNOSIS — S63.502A SPRAIN OF LEFT WRIST, INITIAL ENCOUNTER: Primary | ICD-10-CM
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
## 2023-11-23 PROCEDURE — 29130 APPL FINGER SPLINT STATIC: CPT

## 2023-11-23 PROCEDURE — 99283 EMERGENCY DEPT VISIT LOW MDM: CPT

## 2023-11-24 VITALS
SYSTOLIC BLOOD PRESSURE: 119 MMHG | WEIGHT: 220 LBS | HEIGHT: 62 IN | RESPIRATION RATE: 17 BRPM | TEMPERATURE: 98 F | HEART RATE: 94 BPM | DIASTOLIC BLOOD PRESSURE: 81 MMHG | OXYGEN SATURATION: 98 % | BODY MASS INDEX: 40.48 KG/M2

## 2023-11-24 NOTE — ED PROVIDER NOTES
Encounter Date: 2023       History     Chief Complaint   Patient presents with    Wrist Injury     Left wrist and left thumb pain after falling down today at 1800, pt states a chair was pulled out from under her while she was sitting down, tried to catch self with left hand, denies any other injuries     33-year-old female presents emergency department with complaint of left wrist pain after she fell on outstretched hand out of a chair.  Patient states that she has had persistent left wrist and left thumb pain since her fall.  She denies any elbow or upper arm injury.  Denies any other mechanical pain or injury.  No head trauma noted.      Review of patient's allergies indicates:   Allergen Reactions    Keflex [cephalexin]      rash    Latex, natural rubber Hives     Past Medical History:   Diagnosis Date    Diabetes mellitus     Renal disorder     kidney stone     Past Surgical History:   Procedure Laterality Date     SECTION      x 2    VULVECTOMY Left 2022    Procedure: VULVECTOMY, EXCISION OF VULVAR MASS AND OTHER INDICATED PROCEDURES;  Surgeon: Miguel Ángel Arrington MD;  Location: [facility] OR;  Service: OB/GYN;  Laterality: Left;    WRIST ARTHROSCOPY W/ TRIANGULAR FIBROCARTILAGE REPAIR       Family History   Problem Relation Age of Onset    Cancer Maternal Grandmother     Heart disease Maternal Grandmother     Leukemia Maternal Grandmother     Hyperlipidemia Father     Hypertension Father     Diabetes Father     Breast cancer Maternal Aunt     Ovarian cancer Maternal Aunt      Social History     Tobacco Use    Smoking status: Never    Smokeless tobacco: Never   Substance Use Topics    Alcohol use: Yes     Comment: occasionally    Drug use: No     Review of Systems   Constitutional:  Negative for fever.   HENT:  Negative for sore throat.    Respiratory:  Negative for shortness of breath.    Cardiovascular:  Negative for chest pain.   Gastrointestinal:  Negative for nausea and vomiting.   Genitourinary:   Negative for dysuria.   Musculoskeletal:  Positive for arthralgias and myalgias. Negative for back pain.   Skin:  Negative for rash.   Neurological:  Negative for weakness.   Hematological:  Does not bruise/bleed easily.       Physical Exam     Initial Vitals [11/23/23 2338]   BP Pulse Resp Temp SpO2   (!) 141/93 105 16 97.7 °F (36.5 °C) 97 %      MAP       --         Physical Exam    Nursing note and vitals reviewed.  Constitutional: She appears well-developed and well-nourished.   HENT:   Head: Normocephalic and atraumatic.   Nose: Nose normal.   Mouth/Throat: Oropharynx is clear and moist.   Eyes: Conjunctivae and EOM are normal. Pupils are equal, round, and reactive to light.   Neck: Neck supple. No thyromegaly present. No tracheal deviation present.   Normal range of motion.  Cardiovascular:  Normal rate, regular rhythm, normal heart sounds and intact distal pulses.     Exam reveals no gallop and no friction rub.       No murmur heard.  Pulmonary/Chest: Breath sounds normal. No stridor. No respiratory distress.   Abdominal: Abdomen is soft. Bowel sounds are normal. She exhibits no distension and no mass. There is no abdominal tenderness. There is no rebound and no guarding.   Musculoskeletal:         General: No edema.      Left wrist: Tenderness present. No snuff box tenderness or crepitus. Decreased range of motion. Normal pulse.        Arms:       Cervical back: Normal range of motion and neck supple.     Lymphadenopathy:     She has no cervical adenopathy.   Neurological: She is alert and oriented to person, place, and time. She has normal strength and normal reflexes. GCS score is 15. GCS eye subscore is 4. GCS verbal subscore is 5. GCS motor subscore is 6.   Skin: Skin is warm and dry. Capillary refill takes less than 2 seconds.   Psychiatric: She has a normal mood and affect.         ED Course   Splint Application    Date/Time: 11/24/2023 12:29 AM    Performed by: Fito Ferrell MD  Authorized by:  Fito Ferrell MD  Location details: left wrist  Splint type: thumb spica  Supplies used: Ortho-Glass  Post-procedure: The splinted body part was neurovascularly unchanged following the procedure.  Patient tolerance: Patient tolerated the procedure well with no immediate complications        Labs Reviewed - No data to display       Imaging Results              X-Ray Hand 3 view Left (In process)                      X-Ray Wrist Complete Left (In process)                      Medications - No data to display  Medical Decision Making  Amount and/or Complexity of Data Reviewed  Radiology: ordered.               ED Course as of 11/24/23 0037   Fri Nov 24, 2023   0036 Patient seen evaluated emergency department status post fall on outstretched hand with complaint of left wrist and left thumb pain.  X-rays revealed no evidence of acute fracture or subluxation.  Findings on examination consistent with wrist sprain and left carpometacarpal sprain.  Patient placed in a thumb spica Velcro splint.  Positive neurovascularly intact after splint application.  She is to take Motrin as needed for pain.  Follow with orthopedist as previously scheduled for her right hand.  She is return to emergency department if problems persist worsens or additional concerns. [RM]      ED Course User Index  [RM] Fito Ferrell MD               Medical Decision Making:   Initial Assessment:   33-year-old female presents emergency department with complaint of left wrist pain after she fell on outstretched hand out of a chair.  Patient states that she has had persistent left wrist and left thumb pain since her fall.  She denies any elbow or upper arm injury.  Denies any other mechanical pain or injury.  No head trauma noted.    Differential Diagnosis:   Distal radius fracture, scaphoid fracture, gamekeeper's thumb, metacarpophalangeal sprain  Clinical Tests:   Radiological Study: Ordered and Reviewed          Clinical Impression:  Final  diagnoses:  [W19.XXXA] Fall  [S63.502A] Sprain of left wrist, initial encounter (Primary)  [S63.602A] Sprain of left thumb, unspecified site of digit, initial encounter          ED Disposition Condition    Discharge Stable          ED Prescriptions    None       Follow-up Information       Follow up With Specialties Details Why Contact Info    Layo Reis III [facility] Schedule an appointment as soon as possible for a visit in 1 week For recheck/continuing care [address] [place] [id number]  [phone]               Fito Ferrell MD  11/24/23 0028       Fito Ferrell MD  11/24/23 0030       Fito Ferrell MD  11/24/23 0037

## 2023-11-29 ENCOUNTER — CLINICAL SUPPORT (OUTPATIENT)
Dept: REHABILITATION | Facility: HOSPITAL | Age: 33
End: 2023-11-29
Payer: MEDICAID

## 2023-11-29 DIAGNOSIS — M79.641 RIGHT HAND PAIN: Primary | ICD-10-CM

## 2023-11-29 PROCEDURE — 97530 THERAPEUTIC ACTIVITIES: CPT | Mod: PN

## 2023-12-29 ENCOUNTER — HOSPITAL ENCOUNTER (EMERGENCY)
Facility: HOSPITAL | Age: 33
Discharge: HOME | End: 2023-12-30
Attending: EMERGENCY MEDICINE
Payer: MEDICAID

## 2023-12-29 DIAGNOSIS — M96.841: Primary | ICD-10-CM

## 2023-12-29 DIAGNOSIS — T14.8XXA BRUISING: ICD-10-CM

## 2023-12-29 PROCEDURE — 99284 EMERGENCY DEPT VISIT MOD MDM: CPT

## 2023-12-30 VITALS
TEMPERATURE: 98 F | OXYGEN SATURATION: 99 % | RESPIRATION RATE: 17 BRPM | WEIGHT: 220 LBS | SYSTOLIC BLOOD PRESSURE: 134 MMHG | HEART RATE: 82 BPM | BODY MASS INDEX: 40.24 KG/M2 | DIASTOLIC BLOOD PRESSURE: 86 MMHG

## 2023-12-30 PROCEDURE — 25000003 PHARM REV CODE 250: Performed by: EMERGENCY MEDICINE

## 2023-12-30 RX ORDER — HYDROCODONE BITARTRATE AND ACETAMINOPHEN 7.5; 325 MG/15ML; MG/15ML
5 SOLUTION ORAL
Status: DISCONTINUED | OUTPATIENT
Start: 2023-12-30 | End: 2023-12-30

## 2023-12-30 RX ORDER — HYDROCODONE BITARTRATE AND ACETAMINOPHEN 5; 325 MG/1; MG/1
1 TABLET ORAL
Status: COMPLETED | OUTPATIENT
Start: 2023-12-30 | End: 2023-12-30

## 2023-12-30 RX ADMIN — HYDROCODONE BITARTRATE AND ACETAMINOPHEN 1 TABLET: 5; 325 TABLET ORAL at 12:12

## 2023-12-30 NOTE — ED PROVIDER NOTES
Encounter Date: 2023       History     Chief Complaint   Patient presents with    Bleeding/Bruising     Patient had surgery to right arm on 23 and now has bruising near site.      [age] year female past medical history significant of diabetes and kidney stones presents secondary to postop complication.  Patient states she had a recent procedure done on her right upper extremity involving tendon repair and developed a hematoma to upper extremity with more swelling around her cast.  She denies any decreased sensation or range of motion outside of secondary to pain.  She is otherwise stable and has no other complaints.      Review of patient's allergies indicates:   Allergen Reactions    Keflex [cephalexin]      rash    Latex, natural rubber Hives     Past Medical History:   Diagnosis Date    Diabetes mellitus     Renal disorder     kidney stone     Past Surgical History:   Procedure Laterality Date     SECTION      x 2    VULVECTOMY Left 2022    Procedure: VULVECTOMY, EXCISION OF VULVAR MASS AND OTHER INDICATED PROCEDURES;  Surgeon: Miguel Ángel Arrington MD;  Location: [facility];  Service: OB/GYN;  Laterality: Left;    WRIST ARTHROSCOPY W/ TRIANGULAR FIBROCARTILAGE REPAIR       Family History   Problem Relation Age of Onset    Cancer Maternal Grandmother     Heart disease Maternal Grandmother     Leukemia Maternal Grandmother     Hyperlipidemia Father     Hypertension Father     Diabetes Father     Breast cancer Maternal Aunt     Ovarian cancer Maternal Aunt      Social History     Tobacco Use    Smoking status: Never    Smokeless tobacco: Never   Substance Use Topics    Alcohol use: Yes     Comment: occasionally    Drug use: No     Review of Systems   Musculoskeletal:  Positive for joint swelling (Right upper extremity) and myalgias.   All other systems reviewed and are negative.      Physical Exam     Initial Vitals [23 2303]   BP Pulse Resp Temp SpO2   (!) 164/91 86 17 97.6 °F (36.4  °C) 97 %      MAP       --         Physical Exam    Nursing note and vitals reviewed.  Constitutional: She appears well-developed and well-nourished. No distress.   HENT:   Head: Normocephalic and atraumatic.   Mouth/Throat: Oropharynx is clear and moist.   Eyes: Conjunctivae and EOM are normal. Pupils are equal, round, and reactive to light.   Neck: No tracheal deviation present. No JVD present.   Normal range of motion.  Cardiovascular:  Normal rate, regular rhythm, normal heart sounds and intact distal pulses.     Exam reveals no gallop and no friction rub.       No murmur heard.  Pulmonary/Chest: Breath sounds normal. No respiratory distress. She has no wheezes. She exhibits no tenderness.   Abdominal: Abdomen is soft. Bowel sounds are normal. She exhibits no distension. There is no abdominal tenderness. There is no rebound and no guarding.   Musculoskeletal:         General: Tenderness (right upper extremity) and edema present. Normal range of motion.      Cervical back: Normal range of motion.     Neurological: She is alert and oriented to person, place, and time. She has normal strength. No cranial nerve deficit or sensory deficit.   Skin: Skin is warm and dry. Capillary refill takes less than 2 seconds. No erythema.   Psychiatric: She has a normal mood and affect.         ED Course   Procedures  Labs Reviewed - No data to display       Imaging Results              US Upper Extremity Veins Right (Final result)  Result time 12/30/23 01:56:52      Final result by Wily Ortiz MD (12/30/23 01:56:52)                   Narrative:    EXAM: Right upper extremity venous ultrasound using grayscale, color Doppler, and spectral analysis.  INDICATION: Postop pain and swelling with hematoma  COMPARISON: None available    Velocity thresholds utilized (SRU) are based on comparison with angiograms utilizing NASCET criteria.    FINDINGS:  All visualized venous segments easily and completely compress with no  intraluminal thrombus identified. There are normal changes in venous flow with respiration and augmentation maneuvers. Exam is negative for deep venous thrombosis. Soft tissue edema. No localized fluid collection.    IMPRESSION:    No sonographic evidence of DVT in the right upper extremity.    Electronically signed by:  Wily Ortiz MD  12/30/2023 01:56 AM CST Workstation: [id number]                                     Medications   HYDROcodone-acetaminophen 5-325 mg per tablet 1 tablet (1 tablet Oral Given 12/30/23 0046)     Medical Decision Making  [age] year female initial assessment in mild to moderate distress secondary to right upper extremity postop pain and swelling.  Patient is alert and oriented x3, neurologically and neurovascular intact no focal deficits.  She is nontoxic-appearing and vitals stable at this time.    Differential diagnosis: Hematoma, contusion, compartment syndrome    Amount and/or Complexity of Data Reviewed  Radiology: ordered. Decision-making details documented in ED Course.    Risk  Risk Details: Patient has been reassessed noted to have no acute changes in her condition.  Casting was taken down at bedside ultrasound performed showing no DVTs or blood clots.  Patient given adequate pain meds while in the ED and follow with primary care physician for reassessment of her wound.  She is remained stable while in the ED and discharged home stable condition with follow-up as discussed.  Ms. James and family are aware of the plan and in agreement with discharge.    Patient's plan and diagnosis was discussed. All questions were answered and patient was comfortable with the plan. This patient was personally seen and personally examined by me and I personally performed the services described in this documentation.   Complexity of the visit is established by the note or I have spent at least the amount of time discussing findings, exam and/or radiographs or imaging studies.     MD  uses EPIC and voice recognition software prone to occasional and minor errors that may persist in the medical record.                                        Clinical Impression:  Final diagnoses:  [M96.841] Postoperative hematoma of musculoskeletal structure following non-musculoskeletal procedure (Primary)  [T14.8XXA] Bruising          ED Disposition Condition    Discharge Stable          ED Prescriptions    None       Follow-up Information       Follow up With Specialties Details Why Contact Info Additional Information    [facility] - Emergency Dept Emergency Medicine  As needed, If symptoms worsen [address] [facility] [id number] [phone] 1st floor    Layo Reis III, PA-C Internal Medicine  For wound re-check [address] [facility] [address]  [phone]                Chinmay Munoz MD  12/30/23 6480

## 2024-02-05 ENCOUNTER — OFFICE VISIT (OUTPATIENT)
Dept: URGENT CARE | Facility: CLINIC | Age: 34
End: 2024-02-05
Payer: MEDICAID

## 2024-02-05 VITALS
DIASTOLIC BLOOD PRESSURE: 92 MMHG | TEMPERATURE: 98 F | OXYGEN SATURATION: 98 % | HEIGHT: 62 IN | BODY MASS INDEX: 41.96 KG/M2 | SYSTOLIC BLOOD PRESSURE: 133 MMHG | WEIGHT: 228 LBS | RESPIRATION RATE: 18 BRPM | HEART RATE: 84 BPM

## 2024-02-05 DIAGNOSIS — Z20.822 COVID-19 VIRUS NOT DETECTED: ICD-10-CM

## 2024-02-05 DIAGNOSIS — J40 BRONCHITIS: Primary | ICD-10-CM

## 2024-02-05 DIAGNOSIS — R89.4 INFLUENZA A VIRUS NOT DETECTED: ICD-10-CM

## 2024-02-05 DIAGNOSIS — R05.9 COUGH, UNSPECIFIED TYPE: ICD-10-CM

## 2024-02-05 LAB
CTP QC/QA: YES
FLUAV AG NPH QL: NEGATIVE
FLUBV AG NPH QL: NEGATIVE
S PYO RRNA THROAT QL PROBE: NEGATIVE
SARS-COV-2 AG RESP QL IA.RAPID: NEGATIVE

## 2024-02-05 PROCEDURE — 99203 OFFICE O/P NEW LOW 30 MIN: CPT | Mod: S$GLB,,,

## 2024-02-05 PROCEDURE — 87811 SARS-COV-2 COVID19 W/OPTIC: CPT | Mod: QW,S$GLB,,

## 2024-02-05 PROCEDURE — 87880 STREP A ASSAY W/OPTIC: CPT | Mod: QW,,,

## 2024-02-05 PROCEDURE — 87804 INFLUENZA ASSAY W/OPTIC: CPT | Mod: 59,QW,,

## 2024-02-05 RX ORDER — BENZONATATE 100 MG/1
100 CAPSULE ORAL 3 TIMES DAILY PRN
Qty: 30 CAPSULE | Refills: 0 | Status: SHIPPED | OUTPATIENT
Start: 2024-02-05 | End: 2024-02-15

## 2024-02-05 RX ORDER — ALBUTEROL SULFATE 90 UG/1
2 AEROSOL, METERED RESPIRATORY (INHALATION) EVERY 6 HOURS PRN
Qty: 6.7 G | Refills: 0 | Status: SHIPPED | OUTPATIENT
Start: 2024-02-05 | End: 2024-03-06

## 2024-02-05 RX ORDER — PREDNISONE 20 MG/1
20 TABLET ORAL 2 TIMES DAILY
Qty: 10 TABLET | Refills: 0 | Status: SHIPPED | OUTPATIENT
Start: 2024-02-05 | End: 2024-02-10

## 2024-02-05 RX ORDER — AZITHROMYCIN 250 MG/1
TABLET, FILM COATED ORAL
Qty: 6 TABLET | Refills: 0 | Status: SHIPPED | OUTPATIENT
Start: 2024-02-05

## 2024-02-05 NOTE — PROGRESS NOTES
"Subjective:      Patient ID: Patricia James is a 33 y.o. female.    Vitals:  height is 5' 2" (1.575 m) and weight is 103.4 kg (228 lb). Her temperature is 98.2 °F (36.8 °C). Her blood pressure is 133/92 (abnormal) and her pulse is 84. Her respiration is 18 and oxygen saturation is 98%.     Chief Complaint: Cough    Productive cough, sore throat for 1 week.  Has been using Mucinex.    Cough  This is a new problem. The current episode started in the past 7 days. The cough is Productive of sputum. Associated symptoms include nasal congestion, postnasal drip and a sore throat. Pertinent negatives include no fever or shortness of breath.       Constitution: Negative for fatigue and fever.   HENT:  Positive for postnasal drip and sore throat. Negative for congestion and trouble swallowing.    Neck: neck negative.   Cardiovascular: Negative.    Respiratory:  Positive for cough and sputum production. Negative for shortness of breath.    Gastrointestinal: Negative.    Endocrine: negative.   Genitourinary: Negative.    Musculoskeletal: Negative.    Skin: Negative.    Allergic/Immunologic: Positive for immunizations up-to-date. Negative for flu shot.   Hematologic/Lymphatic: Negative.    Psychiatric/Behavioral: Negative.        Objective:     Physical Exam   Constitutional: She is oriented to person, place, and time. She appears well-developed. She is cooperative.   HENT:   Head: Normocephalic and atraumatic.   Ears:   Right Ear: Hearing, tympanic membrane, external ear and ear canal normal.   Left Ear: Hearing, tympanic membrane, external ear and ear canal normal.   Nose: Nose normal. No mucosal edema or nasal deformity. No epistaxis. Right sinus exhibits no maxillary sinus tenderness and no frontal sinus tenderness. Left sinus exhibits no maxillary sinus tenderness and no frontal sinus tenderness.   Mouth/Throat: Uvula is midline, oropharynx is clear and moist and mucous membranes are normal. Mucous membranes are " moist. No trismus in the jaw. Normal dentition. No uvula swelling.   Eyes: Conjunctivae and lids are normal. Pupils are equal, round, and reactive to light. Extraocular movement intact   Neck: Trachea normal and phonation normal. Neck supple.   Cardiovascular: Normal rate, regular rhythm, normal heart sounds and normal pulses.   Pulmonary/Chest: Effort normal and breath sounds normal.   Abdominal: Normal appearance.   Musculoskeletal: Normal range of motion.         General: Normal range of motion.   Neurological: no focal deficit. She is alert, oriented to person, place, and time and at baseline. She exhibits normal muscle tone.   Skin: Skin is warm, dry and intact. Capillary refill takes 2 to 3 seconds.   Psychiatric: Her speech is normal and behavior is normal. Mood, judgment and thought content normal.   Nursing note and vitals reviewed.      Assessment:     1. Bronchitis    2. Cough, unspecified type    3. Influenza A virus not detected    4. COVID-19 virus not detected        Plan:       Bronchitis  -     azithromycin (Z-CHAVO) 250 MG tablet; Take 2 tablets by mouth on day 1; Take 1 tablet by mouth on days 2-5  Dispense: 6 tablet; Refill: 0  -     benzonatate (TESSALON) 100 MG capsule; Take 1 capsule (100 mg total) by mouth 3 (three) times daily as needed for Cough.  Dispense: 30 capsule; Refill: 0  -     albuterol (PROVENTIL HFA) 90 mcg/actuation inhaler; Inhale 2 puffs into the lungs every 6 (six) hours as needed for Wheezing. Rescue  Dispense: 6.7 g; Refill: 0  -     predniSONE (DELTASONE) 20 MG tablet; Take 1 tablet (20 mg total) by mouth 2 (two) times daily. for 5 days  Dispense: 10 tablet; Refill: 0    Cough, unspecified type  -     POCT rapid strep A  -     POCT Influenza A/B Rapid Antigen  -     SARS Coronavirus 2 Antigen, POCT Manual Read    Influenza A virus not detected    COVID-19 virus not detected

## 2024-02-07 ENCOUNTER — PATIENT MESSAGE (OUTPATIENT)
Dept: ADMINISTRATIVE | Facility: HOSPITAL | Age: 34
End: 2024-02-07
Payer: MEDICAID

## 2024-03-17 ENCOUNTER — HOSPITAL ENCOUNTER (EMERGENCY)
Facility: HOSPITAL | Age: 34
Discharge: HOME | End: 2024-03-17
Attending: EMERGENCY MEDICINE
Payer: MEDICAID

## 2024-03-17 VITALS
TEMPERATURE: 98 F | HEART RATE: 81 BPM | RESPIRATION RATE: 20 BRPM | BODY MASS INDEX: 37.21 KG/M2 | OXYGEN SATURATION: 97 % | HEIGHT: 63 IN | SYSTOLIC BLOOD PRESSURE: 135 MMHG | DIASTOLIC BLOOD PRESSURE: 92 MMHG | WEIGHT: 210 LBS

## 2024-03-17 DIAGNOSIS — M25.531 RIGHT WRIST PAIN: ICD-10-CM

## 2024-03-17 LAB
B-HCG UR QL: NEGATIVE
CTP QC/QA: YES

## 2024-03-17 PROCEDURE — 63600175 PHARM REV CODE 636 W HCPCS: Performed by: EMERGENCY MEDICINE

## 2024-03-17 PROCEDURE — 81025 URINE PREGNANCY TEST: CPT | Performed by: EMERGENCY MEDICINE

## 2024-03-17 PROCEDURE — 96372 THER/PROPH/DIAG INJ SC/IM: CPT | Performed by: EMERGENCY MEDICINE

## 2024-03-17 PROCEDURE — 99284 EMERGENCY DEPT VISIT MOD MDM: CPT | Mod: 25

## 2024-03-17 RX ORDER — DICLOFENAC SODIUM 75 MG/1
75 TABLET, DELAYED RELEASE ORAL 2 TIMES DAILY
Qty: 60 TABLET | Refills: 0 | Status: SHIPPED | OUTPATIENT
Start: 2024-03-17

## 2024-03-17 RX ORDER — KETOROLAC TROMETHAMINE 30 MG/ML
15 INJECTION, SOLUTION INTRAMUSCULAR; INTRAVENOUS
Status: COMPLETED | OUTPATIENT
Start: 2024-03-17 | End: 2024-03-17

## 2024-03-17 RX ADMIN — KETOROLAC TROMETHAMINE 15 MG: 30 INJECTION, SOLUTION INTRAMUSCULAR at 11:03

## 2024-03-18 NOTE — ED PROVIDER NOTES
Encounter Date: 3/17/2024       History     Chief Complaint   Patient presents with    R arm pain     R arm pain after being hit in the wrist during horseplay     HPI  34 y.o.   S/p tendon surgery in Dec   Co R wrist pain after hitting it tonight   Mainly radial; side  Worse with movement  Mod  Nr    Review of patient's allergies indicates:   Allergen Reactions    Keflex [cephalexin]      rash    Latex, natural rubber Hives     Past Medical History:   Diagnosis Date    Diabetes mellitus     Renal disorder     kidney stone     Past Surgical History:   Procedure Laterality Date     SECTION      x 2    VULVECTOMY Left 2022    Procedure: VULVECTOMY, EXCISION OF VULVAR MASS AND OTHER INDICATED PROCEDURES;  Surgeon: Miguel Ángel Arrington MD;  Location: [facility] OR;  Service: OB/GYN;  Laterality: Left;    WRIST ARTHROSCOPY W/ TRIANGULAR FIBROCARTILAGE REPAIR       Family History   Problem Relation Age of Onset    Cancer Maternal Grandmother     Heart disease Maternal Grandmother     Leukemia Maternal Grandmother     Hyperlipidemia Father     Hypertension Father     Diabetes Father     Breast cancer Maternal Aunt     Ovarian cancer Maternal Aunt      Social History     Tobacco Use    Smoking status: Never    Smokeless tobacco: Never   Substance Use Topics    Alcohol use: Yes     Comment: occasionally    Drug use: No     Review of Systems  All systems were reviewed/examined and were negative except as noted in the HPI.    Physical Exam     Initial Vitals [24 2235]   BP Pulse Resp Temp SpO2   (!) 135/92 81 20 97.6 °F (36.4 °C) 97 %      MAP       --         Physical Exam    General: the patient is awake, alert, and in no apparent distress.  Head: normocephalic and atraumatic, sclera are clear  Neck: supple without meningismus  Chest:, no respiratory distress  Heart: regular rate and rhythm  R wrist tenderness  Well healed scars  Hand nvi and nt  Extremities: warm and well perfused  Skin: warm and dry  Psych  conversant  Neuro: awake, alert, moving all extremities    ED Course   Procedures  Labs Reviewed   POCT URINE PREGNANCY          Imaging Results              X-Ray Wrist Complete Right (Final result)  Result time 03/18/24 09:25:44      Final result by Ranjana Bee MD (03/18/24 09:25:44)                   Impression:      No acute osseous abnormality.    The preliminary and final reports are concordant.      Electronically signed by: Ranjana Bee  Date:    03/18/2024  Time:    09:25               Narrative:    EXAMINATION:  XR WRIST COMPLETE 3 VIEWS RIGHT    CLINICAL HISTORY:  Pain in right wrist    TECHNIQUE:  PA, lateral, and oblique views of the right wrist were performed.    COMPARISON:  Right wrist radiographs 10/01/2014    FINDINGS:  No acute, displaced fracture, aggressive osseous abnormality or dislocation.  No focal soft tissue abnormality.                                       Medications   ketorolac injection 15 mg (15 mg Intramuscular Given 3/17/24 5205)     Medical Decision Making  Amount and/or Complexity of Data Reviewed  Labs: ordered.  Radiology: ordered.    Risk  Prescription drug management.       Medical Decision Making:    This is an emergent evaluation of a patient presenting to the ED.  Nursing notes were reviewed.  Xr R wrist neg  I reviewed radiology images personally along with interpretations.  I personally reviewed and interpreted the laboratory results.  Hcg neg  I decided to obtain and review old medical records, which showed: h/o arm surgery    Evaluation for Emergency Medical Condition  The patient received a medical screening exam and within a reasonable degree of clinical confidence an emergency medical condition has not been identified.  The patient is instructed on proper follow up and return precautions to the ED.      Víctor Pedraza MD, MARY                                 Clinical Impression:  Final diagnoses:  [M25.531] Right wrist pain          ED Disposition Condition     Discharge Stable          ED Prescriptions       Medication Sig Dispense Start Date End Date Auth. Provider    diclofenac (VOLTAREN) 75 MG EC tablet Take 1 tablet (75 mg total) by mouth 2 (two) times daily. 60 tablet 3/17/2024 -- Jr Pedraza MD          Follow-up Information       Follow up With Specialties Details Why Contact Info    Your surgeon at [facility]  Schedule an appointment as soon as possible for a visit             Discharged to home in stable condition, return to ED warnings given, follow up and patient care instructions given.      Víctor Pedraza MD, MARY, FACEP  Department of Emergency Medicine       Jr Pedraza MD  03/19/24 0417

## 2024-03-19 DIAGNOSIS — G89.29 CHRONIC WRIST PAIN: Primary | ICD-10-CM

## 2024-03-19 DIAGNOSIS — M25.539 CHRONIC WRIST PAIN: Primary | ICD-10-CM

## 2024-03-21 ENCOUNTER — CLINICAL SUPPORT (OUTPATIENT)
Dept: REHABILITATION | Facility: HOSPITAL | Age: 34
End: 2024-03-21
Payer: MEDICAID

## 2024-03-21 DIAGNOSIS — M25.531 CHRONIC PAIN OF RIGHT WRIST: Primary | ICD-10-CM

## 2024-03-21 DIAGNOSIS — G89.29 CHRONIC PAIN OF RIGHT WRIST: Primary | ICD-10-CM

## 2024-03-21 DIAGNOSIS — M25.631 STIFFNESS OF RIGHT WRIST JOINT: ICD-10-CM

## 2024-03-21 DIAGNOSIS — M25.431 RIGHT WRIST EFFUSION: ICD-10-CM

## 2024-03-21 PROBLEM — M25.539 CHRONIC WRIST PAIN: Status: ACTIVE | Noted: 2024-03-21

## 2024-03-21 PROCEDURE — 97530 THERAPEUTIC ACTIVITIES: CPT | Mod: PN

## 2024-03-21 PROCEDURE — 97165 OT EVAL LOW COMPLEX 30 MIN: CPT | Mod: PN

## 2024-03-21 NOTE — PLAN OF CARE
"  [facility] Occupational Therapy  Initial Evaluation     Date: 3/21/2024  Name: Patricia James  Clinic Number: [id number]    Therapy Diagnosis:   Encounter Diagnoses   Name Primary?    Chronic pain of right wrist Yes    Stiffness of right wrist joint     Right wrist effusion      Physician: Order, Paper    Physician Orders: evaluate and tx, see epic  Medical Diagnosis: right wrist pain, tfcc injury      See surgical report for details if applicable      Evaluation Date: 3/21/2024  Insurance Authorization Period Expiration: referral [id number] 3-21-24 thru 24              Plan of Care Certification Period: 3-21-24 thru 24                         Date of Return to referral source's office: will get from patient    Visit # / Visits authorized:  referral [id number] 3-21-24 thru 24  Time In:11  Time Out: 1130  Total Billable Time: 15 minutes    Precautions:  universal, see epic, protocol if applicable  Subjective     Involved Side: right  Dominant Side: right  Date of Onset: around   History of Current Condition/Mechanism of Injury: says had 2 wrist "scopes" around ; recently had surgeries-see epic; says did not have post op rehab; recently had CSI right ulnar fovea and s-l interval, mri done with results in epic, OT recently ordered  Imaging: see epic  Previous Therapy: none for above dx    Past Medical History/Physical Systems Review:   Patricia James  has a past medical history of Diabetes mellitus and Renal disorder.    Patricia James  has a past surgical history that includes  section; Wrist arthroscopy w/ triangular fibrocartilage repair; and Vulvectomy (Left, 2022).    Patricia has a current medication list which includes the following prescription(s): albuterol, azithromycin, cetirizine, diclofenac, and metformin, and the following Facility-Administered Medications: lidocaine-prilocaine.    Review of patient's allergies indicates: "   Allergen Reactions    Keflex [cephalexin]      rash    Latex, natural rubber Hives        Patient's Goals for Therapy: full painless use    Pain:  Functional Pain Scale Rating 0-10:   5/10 on average  4/10 at best  7/10 at worst  Side:right  Location: radial wrist, ulnar wrist  Description: radial-bun; ulnar-stab  Aggravating Factors: some use  Easing Factors: rest  Occupation:    Working presently: yes  Duties: per job if working; typical self and home care    Functional Limitations/Social History:    Previous functional status includes: Independent with all ADLs.     Current Functional Status   Home/Living environment : lives with spouse and kids    Limitation of Functional Status as follows: decreased motion, use, and strength with ADL   ADLs/IADLs:               See above   Leisure: not tested  pain meds: denies  nicotine: denies  caffeine: 1  cup regular coffee daily    Objective   Posture:scars per surgery in epic  Palpation:not tested  Sensation:denies numbness and tingling  ROM:    Full prom forearm thru hand except df/pf right 60/70 left 80/100       Edema:circumferential     wrist right 17.5 cm left 16.5 cm        CMS Impairment/Limitation/Restriction for FOTO Survey    Therapist reviewed FOTO scores for Patricia James on 3/21/2024.   FOTO documents entered into [facility] - see Media section.                 Treatment     Treatment Time In: 1115  Treatment Time Out: 1130  Total Treatment time separate from Evaluation time:15      Patricia received therapeutic exercises for 15 minutes including:  -see above and/or media section                Home Exercise Program/Education:  Issued HEP (see patient instructions in EMR in media or note) . Exercises were reviewed and Patricia was able to demonstrate them prior to the end of the session.   Pt received a written copy of exercises to perform at home. Patricia demonstrated good understanding of the education provided.  Pt was advised to perform  these exercises free of pain, and to stop performing them if pain occurs.    Patient/Family Education: role of OT, goals for OT, scheduling/cancellations - pt verbalized understanding. Discussed insurance limitations with patient.    Additional Education provided: likely tx progression, expectations of rehab    Explained formal tx on tfcc will not be done given mri findings and that OT likely will not be of benefit due to extent of damage and what mri revealed    Assessment     Patricia James is a 34 y.o. female referred to outpatient occupational therapy and presents with a medical diagnosis of see above resulting in Decreased ROM, Decreased muscle strength, Decreased functional hand use, Increased pain, Edema, Joint Stiffness, and Scar Adhesions and demonstrates limitations as described in the chart below. Following medical record review it is determined that pt will benefit from occupational therapy services in order to maximize pain free and/or functional use of right hand. The following goals were discussed with the patient and patient is in agreement with them as to be addressed in the treatment plan. The patient's rehab potential is good.     Anticipated barriers to occupational therapy: edema, pain, stiffness, scar, weakness; need for education of likely and proper tx progression, estimated tx duration based on extent of symptoms pre OT and current deficits with functional use  Pt has no cultural, educational or language barriers to learning provided.    Profile and History Assessment of Occupational Performance Level of Clinical Decision Making Complexity Score   Occupational Profile:   Patricia James is a 34 y.o. female who lives with their family and is currently employed Patricia James has difficulty with  ADLs and IADLs as listed previously, which  affecting his/her daily functional abilities.      Comorbidities:    has a past medical history of Diabetes mellitus and Renal  disorder.    Medical and Therapy History Review:   Brief               Performance Deficits    Physical:  Joint Mobility  Skin Integrity/Scar Formation  Edema  Pain    Cognitive:  No Deficits    Psychosocial:    No Deficits     Clinical Decision Making:  low    Assessment Process:  Problem-Focused Assessments    Modification/Need for Assistance:  Not Necessary    Intervention Selection:  Limited Treatment Options       low  Based on PMHX, co morbidities , data from assessments and functional level of assistance required with task and clinical presentation directly impacting function.           The following goals were discussed with the patient and patient is in agreement with them as to be addressed in the treatment plan.     Goals:   Short term goals to be met in 4 weeks 1. Patient will be I with HEP 2. Patient will have 2/10 pain with light use 3. Patient will have = prom of bilateral wrists to enhance affected arm use with ADL      Long term goals to be met by d/c 1. Patient will be I with d/c HEP 2. Patient will have 1/10 pain with light use  3. Patient will be I with all light ADL      all goals ongoing unless noted above or met today or in past but not noted yet    Plan   Certification Period/Plan of care expiration: 3/21/2024 to             6-13-24    Outpatient Occupational Therapy  2 times weekly for 12 weeks to include the following interventions: eval and tx    Above frequency and duration in above dates may change based on patient progress and need for therapy    Ray MONTE CHT      I certify the need for the services furnished under this plan of care.    doctor's signature/referring provider's signature:______________________________________________________

## 2024-03-21 NOTE — PATIENT INSTRUCTIONS
home program 3-21-24   -light painless nonrepetitive use only  -order point relief scar massager off on [employer] like we talked about  -gently massage scars with lotion (not Aquaphor) at least 5 minutes 4 x day  -do below stretches one time, at least 2 x day, at least a 10 minute hold, mild discomfort only  *use left hand to push right wrist straight back  *use left hand to push right wrist straight forward

## 2024-04-03 DIAGNOSIS — R73.03 PREDIABETES: ICD-10-CM

## 2024-04-04 ENCOUNTER — DOCUMENTATION ONLY (OUTPATIENT)
Dept: REHABILITATION | Facility: HOSPITAL | Age: 34
End: 2024-04-04

## 2024-04-04 ENCOUNTER — CLINICAL SUPPORT (OUTPATIENT)
Dept: REHABILITATION | Facility: HOSPITAL | Age: 34
End: 2024-04-04
Payer: MEDICAID

## 2024-04-04 DIAGNOSIS — M79.641 RIGHT HAND PAIN: ICD-10-CM

## 2024-04-04 DIAGNOSIS — M25.631 STIFFNESS OF RIGHT WRIST JOINT: Primary | ICD-10-CM

## 2024-04-04 DIAGNOSIS — M25.431 RIGHT WRIST EFFUSION: ICD-10-CM

## 2024-04-04 PROCEDURE — 97530 THERAPEUTIC ACTIVITIES: CPT | Mod: PN

## 2024-04-04 NOTE — PROGRESS NOTES
Left message with referring provider's office at [phone] for them to send me their fax # so I can send POC for signature

## 2024-04-04 NOTE — PROGRESS NOTES
Occupational Therapy Daily Treatment Note     Date: 4/4/2024  Name: Patricia James  Clinic Number: [id number]    Therapy Diagnosis:   Encounter Diagnoses   Name Primary?    Stiffness of right wrist joint Yes    Right wrist effusion     Right hand pain      Physician: Order, Paper    Physician Orders: evaluate and tx, see epic  Medical Diagnosis: right wrist pain, tfcc injury        See surgical report for details if applicable        Evaluation Date: 3/21/2024  Insurance Authorization Period Expiration: referral [id number] 3-21-24 thru 12-31-24              Plan of Care Certification Period: 3-21-24 thru 6-13-24                         Date of Return to referral source's office: will get from patient     Visit # / Visits authorized: 1 / 1 referral [id number] 3-21-24 thru 12-31-24    Time In:9  Time Out: 10  Total Billable Time: 60 minutes    Precautions:  universal, see epic, protocol if applicable    Subjective     Pt reports: an understanding OT on TFCC, in my opinion, will not be beneficial considering mri results I read in epic; understands to use flat attachment on scar massager she ordered/the one I asked her to get  she is compliant with home exercise program.  Response to previous treatment:good  Functional change: none stated    Pain:  up to 5/10 rest; up to 7/10 light use  Side:right  Location: radial      ulnar wrist    ache  Objective       Timed units:  3 therapeutic exercise                            time:915-10      Untimed units:  fluidotherapy                           time:9-915      Measurements/special tests/observations:     Prom              right          left  Df/pf              80/80        80/100    Extrinsic flexor tightness on right noted        Fluidotherapy: 15 min.    Ultrasound:n/a      Upper body ergometer: n/a    Scar massage: n/a    Stretches as tolerated-pain free: df, pf, prayer, fist forward      Manual: n/a    Range of motion: n/a    Progressive resistive exercise: df, pf,  mark reyna      Home exercise program: see above and/or below    Home Exercises and Education Provided     Education provided:     See above    Explained , pinch, and wrist strengthening will not be done since this likely will increase wrist pain given TFCC status per mri report in epic          progress towards goals   likely treatment progression  rationale of rehab interventions    Written Home Exercises/Information Provided: yes.        previously issued exercises and/or other issued home therapy instructions were reviewed if still part of current treatment plan as well as any issued today and Patricia was able to demonstrate them prior to the end of the session.  Patricia demonstrated good understanding of the HEP provided.     See EMR under patient instructions and/or media for HEP issued today or in past    Assessment             Pt would continue to benefit from skilled occupational therapy in order to facilitate decreased pain an increased use.    Patricia is progressing well towards her goals and there are no updates to goals at this time unless goals noted below are different than goals on previous notes or evaluation. Pt prognosis is good  Pt will continue to benefit from skilled outpatient occupational therapy to address the deficits listed in the problem list on initial evaluation to provide pt/family education and to maximize pt's level of independence in the home and community environment.     Anticipated barriers to occupational therapy: pain, stiffness, scar    Pt's spiritual, cultural and educational needs considered and pt agreeable to plan of care and goals.    Goals:  Short term goals to be met in 4 weeks 1. Patient will be I with HEP 2. Patient will have 2/10 pain with light use 3. Patient will have = prom of bilateral wrists to enhance affected arm use with ADL        Long term goals to be met by d/c 1. Patient will be I with d/c HEP 2. Patient will have 1/10 pain with light use   3. Patient will be I with all light ADL       all goals ongoing unless noted above or met today or in past but not noted yet            Any goals met today ?  (if any met see above)    Updates/Grading for next session: as needed    Plan: evaluate and treat    Ray MONTE CHT

## 2024-04-26 ENCOUNTER — DOCUMENTATION ONLY (OUTPATIENT)
Dept: REHABILITATION | Facility: HOSPITAL | Age: 34
End: 2024-04-26
Payer: MEDICAID

## 2024-04-26 NOTE — PROGRESS NOTES
Outpatient Therapy Discharge Summary     Date: 4-26-24      Name: Patricia James  [facility] Number: [id number]    Therapy Diagnosis: No diagnosis found.  Physician: No ref. provider found    Physician Orders: see evaluation  Medical Diagnosis: see evaluation  Evaluation Date: 3-21-24      Date of Last visit: 4-4-24  Total Visits Received: 2  Cancelled Visits: see epic  No Show Visits: see epic    Assessment    Goals: see last note    Discharge reason: Patient has not attended therapy since 4-4-24    Plan   This patient is discharged from Occupational Therapy

## 2024-05-07 ENCOUNTER — HOSPITAL ENCOUNTER (EMERGENCY)
Facility: HOSPITAL | Age: 34
Discharge: HOME | End: 2024-05-08
Attending: EMERGENCY MEDICINE
Payer: MEDICAID

## 2024-05-07 DIAGNOSIS — R42 VERTIGO: Primary | ICD-10-CM

## 2024-05-07 DIAGNOSIS — R00.2 PALPITATIONS: ICD-10-CM

## 2024-05-07 DIAGNOSIS — R42 DIZZINESS: ICD-10-CM

## 2024-05-07 DIAGNOSIS — N39.0 URINARY TRACT INFECTION WITHOUT HEMATURIA, SITE UNSPECIFIED: ICD-10-CM

## 2024-05-07 LAB
ALBUMIN SERPL BCP-MCNC: 4.8 G/DL (ref 3.5–5.2)
ALP SERPL-CCNC: 68 U/L (ref 55–135)
ALT SERPL W/O P-5'-P-CCNC: 29 U/L (ref 10–44)
ANION GAP SERPL CALC-SCNC: 8 MMOL/L (ref 8–16)
AST SERPL-CCNC: 16 U/L (ref 10–40)
B-HCG UR QL: NEGATIVE
BACTERIA #/AREA URNS HPF: ABNORMAL /HPF
BASOPHILS # BLD AUTO: 0.05 K/UL (ref 0–0.2)
BASOPHILS NFR BLD: 0.6 % (ref 0–1.9)
BILIRUB SERPL-MCNC: 0.4 MG/DL (ref 0.1–1)
BILIRUB UR QL STRIP: NEGATIVE
BUN SERPL-MCNC: 13 MG/DL (ref 6–20)
CALCIUM SERPL-MCNC: 9.6 MG/DL (ref 8.7–10.5)
CAOX CRY URNS QL MICRO: ABNORMAL
CHLORIDE SERPL-SCNC: 105 MMOL/L (ref 95–110)
CLARITY UR: ABNORMAL
CO2 SERPL-SCNC: 24 MMOL/L (ref 23–29)
COLOR UR: YELLOW
CREAT SERPL-MCNC: 0.6 MG/DL (ref 0.5–1.4)
CTP QC/QA: YES
DIFFERENTIAL METHOD BLD: ABNORMAL
EOSINOPHIL # BLD AUTO: 0.2 K/UL (ref 0–0.5)
EOSINOPHIL NFR BLD: 2 % (ref 0–8)
ERYTHROCYTE [DISTWIDTH] IN BLOOD BY AUTOMATED COUNT: 14.4 % (ref 11.5–14.5)
EST. GFR  (NO RACE VARIABLE): >60 ML/MIN/1.73 M^2
GLUCOSE SERPL-MCNC: 145 MG/DL (ref 70–110)
GLUCOSE UR QL STRIP: NEGATIVE
HCT VFR BLD AUTO: 37.7 % (ref 37–48.5)
HGB BLD-MCNC: 12.8 G/DL (ref 12–16)
HGB UR QL STRIP: ABNORMAL
HYALINE CASTS #/AREA URNS LPF: 0 /LPF
IMM GRANULOCYTES # BLD AUTO: 0.04 K/UL (ref 0–0.04)
IMM GRANULOCYTES NFR BLD AUTO: 0.5 % (ref 0–0.5)
KETONES UR QL STRIP: NEGATIVE
LEUKOCYTE ESTERASE UR QL STRIP: NEGATIVE
LYMPHOCYTES # BLD AUTO: 2.5 K/UL (ref 1–4.8)
LYMPHOCYTES NFR BLD: 29.3 % (ref 18–48)
MCH RBC QN AUTO: 26.7 PG (ref 27–31)
MCHC RBC AUTO-ENTMCNC: 34 G/DL (ref 32–36)
MCV RBC AUTO: 79 FL (ref 82–98)
MICROSCOPIC COMMENT: ABNORMAL
MONOCYTES # BLD AUTO: 0.6 K/UL (ref 0.3–1)
MONOCYTES NFR BLD: 6.6 % (ref 4–15)
NEUTROPHILS # BLD AUTO: 5.2 K/UL (ref 1.8–7.7)
NEUTROPHILS NFR BLD: 61 % (ref 38–73)
NITRITE UR QL STRIP: NEGATIVE
NRBC BLD-RTO: 0 /100 WBC
PH UR STRIP: 6 [PH] (ref 5–8)
PLATELET # BLD AUTO: 375 K/UL (ref 150–450)
PMV BLD AUTO: 9.5 FL (ref 9.2–12.9)
POTASSIUM SERPL-SCNC: 3.8 MMOL/L (ref 3.5–5.1)
PROT SERPL-MCNC: 7.4 G/DL (ref 6–8.4)
PROT UR QL STRIP: ABNORMAL
RBC # BLD AUTO: 4.79 M/UL (ref 4–5.4)
RBC #/AREA URNS HPF: 19 /HPF (ref 0–4)
SODIUM SERPL-SCNC: 137 MMOL/L (ref 136–145)
SP GR UR STRIP: >1.03 (ref 1–1.03)
SQUAMOUS #/AREA URNS HPF: 21 /HPF
URN SPEC COLLECT METH UR: ABNORMAL
UROBILINOGEN UR STRIP-ACNC: NEGATIVE EU/DL
WBC # BLD AUTO: 8.45 K/UL (ref 3.9–12.7)
WBC #/AREA URNS HPF: 18 /HPF (ref 0–5)

## 2024-05-07 PROCEDURE — 85025 COMPLETE CBC W/AUTO DIFF WBC: CPT | Performed by: NURSE PRACTITIONER

## 2024-05-07 PROCEDURE — 25000003 PHARM REV CODE 250: Performed by: NURSE PRACTITIONER

## 2024-05-07 PROCEDURE — 87086 URINE CULTURE/COLONY COUNT: CPT | Performed by: NURSE PRACTITIONER

## 2024-05-07 PROCEDURE — 93005 ELECTROCARDIOGRAM TRACING: CPT | Performed by: GENERAL PRACTICE

## 2024-05-07 PROCEDURE — 81001 URINALYSIS AUTO W/SCOPE: CPT | Performed by: NURSE PRACTITIONER

## 2024-05-07 PROCEDURE — 93010 ELECTROCARDIOGRAM REPORT: CPT | Mod: ,,, | Performed by: GENERAL PRACTICE

## 2024-05-07 PROCEDURE — 81025 URINE PREGNANCY TEST: CPT | Performed by: NURSE PRACTITIONER

## 2024-05-07 PROCEDURE — 80053 COMPREHEN METABOLIC PANEL: CPT | Performed by: NURSE PRACTITIONER

## 2024-05-07 RX ORDER — MECLIZINE HCL 12.5 MG 12.5 MG/1
25 TABLET ORAL
Status: COMPLETED | OUTPATIENT
Start: 2024-05-07 | End: 2024-05-07

## 2024-05-07 RX ADMIN — MECLIZINE 25 MG: 12.5 TABLET ORAL at 09:05

## 2024-05-08 VITALS
RESPIRATION RATE: 19 BRPM | WEIGHT: 206 LBS | BODY MASS INDEX: 37.91 KG/M2 | DIASTOLIC BLOOD PRESSURE: 80 MMHG | OXYGEN SATURATION: 98 % | SYSTOLIC BLOOD PRESSURE: 125 MMHG | TEMPERATURE: 98 F | HEIGHT: 62 IN | HEART RATE: 74 BPM

## 2024-05-08 PROCEDURE — 96365 THER/PROPH/DIAG IV INF INIT: CPT

## 2024-05-08 PROCEDURE — 63600175 PHARM REV CODE 636 W HCPCS

## 2024-05-08 PROCEDURE — 63600175 PHARM REV CODE 636 W HCPCS: Performed by: EMERGENCY MEDICINE

## 2024-05-08 PROCEDURE — 25000003 PHARM REV CODE 250: Performed by: EMERGENCY MEDICINE

## 2024-05-08 PROCEDURE — 99285 EMERGENCY DEPT VISIT HI MDM: CPT | Mod: 25

## 2024-05-08 PROCEDURE — 96375 TX/PRO/DX INJ NEW DRUG ADDON: CPT

## 2024-05-08 RX ORDER — NITROFURANTOIN 25; 75 MG/1; MG/1
100 CAPSULE ORAL 2 TIMES DAILY
Qty: 10 CAPSULE | Refills: 0 | Status: SHIPPED | OUTPATIENT
Start: 2024-05-08 | End: 2024-05-13

## 2024-05-08 RX ORDER — METHYLPREDNISOLONE SOD SUCC 125 MG
VIAL (EA) INJECTION
Status: COMPLETED
Start: 2024-05-08 | End: 2024-05-08

## 2024-05-08 RX ORDER — FAMOTIDINE 10 MG/ML
20 INJECTION INTRAVENOUS
Status: COMPLETED | OUTPATIENT
Start: 2024-05-08 | End: 2024-05-08

## 2024-05-08 RX ORDER — DIPHENHYDRAMINE HYDROCHLORIDE 50 MG/ML
INJECTION INTRAMUSCULAR; INTRAVENOUS
Status: DISCONTINUED
Start: 2024-05-08 | End: 2024-05-08 | Stop reason: HOSPADM

## 2024-05-08 RX ORDER — ACETAMINOPHEN 500 MG
1000 TABLET ORAL
Status: COMPLETED | OUTPATIENT
Start: 2024-05-08 | End: 2024-05-08

## 2024-05-08 RX ORDER — METHYLPREDNISOLONE SOD SUCC 125 MG
125 VIAL (EA) INJECTION
Status: COMPLETED | OUTPATIENT
Start: 2024-05-08 | End: 2024-05-08

## 2024-05-08 RX ORDER — CIPROFLOXACIN 2 MG/ML
400 INJECTION, SOLUTION INTRAVENOUS ONCE
Status: COMPLETED | OUTPATIENT
Start: 2024-05-08 | End: 2024-05-08

## 2024-05-08 RX ORDER — MECLIZINE HYDROCHLORIDE 25 MG/1
25 TABLET ORAL 3 TIMES DAILY PRN
Qty: 20 TABLET | Refills: 0 | Status: SHIPPED | OUTPATIENT
Start: 2024-05-08

## 2024-05-08 RX ORDER — DIPHENHYDRAMINE HYDROCHLORIDE 50 MG/ML
25 INJECTION INTRAMUSCULAR; INTRAVENOUS
Status: COMPLETED | OUTPATIENT
Start: 2024-05-08 | End: 2024-05-08

## 2024-05-08 RX ORDER — ONDANSETRON 4 MG/1
4 TABLET, FILM COATED ORAL EVERY 6 HOURS PRN
Qty: 20 TABLET | Refills: 1 | Status: SHIPPED | OUTPATIENT
Start: 2024-05-08 | End: 2025-05-08

## 2024-05-08 RX ADMIN — DIPHENHYDRAMINE HYDROCHLORIDE 25 MG: 50 INJECTION INTRAMUSCULAR; INTRAVENOUS at 01:05

## 2024-05-08 RX ADMIN — METHYLPREDNISOLONE SODIUM SUCCINATE 125 MG: 125 INJECTION, POWDER, FOR SOLUTION INTRAMUSCULAR; INTRAVENOUS at 02:05

## 2024-05-08 RX ADMIN — CIPROFLOXACIN 400 MG: 2 INJECTION, SOLUTION INTRAVENOUS at 01:05

## 2024-05-08 RX ADMIN — Medication 125 MG: at 02:05

## 2024-05-08 RX ADMIN — FAMOTIDINE 20 MG: 10 INJECTION, SOLUTION INTRAVENOUS at 02:05

## 2024-05-08 RX ADMIN — ACETAMINOPHEN 1000 MG: 500 TABLET ORAL at 01:05

## 2024-05-08 NOTE — ED PROVIDER NOTES
Encounter Date: 2024       History     Chief Complaint   Patient presents with    Dizziness    Palpitations     Patient c/o dizziness and palpitations that began x 1 hour ago.     34-year-old female presented emergency department with dizziness and palpitations.  Patient had these symptoms for about 2 weeks.  Denies fever or chills or nausea vomiting or chest pain or shortness of breath or abdominal pain or weakness or numbness      Review of patient's allergies indicates:   Allergen Reactions    Ciprofloxacin Itching and Rash    Keflex [cephalexin]      rash    Latex, natural rubber Hives     Past Medical History:   Diagnosis Date    Diabetes mellitus     Renal disorder     kidney stone     Past Surgical History:   Procedure Laterality Date     SECTION      x 2    VULVECTOMY Left 2022    Procedure: VULVECTOMY, EXCISION OF VULVAR MASS AND OTHER INDICATED PROCEDURES;  Surgeon: Miguel Ángel Arrington MD;  Location: [facility] OR;  Service: OB/GYN;  Laterality: Left;    WRIST ARTHROSCOPY W/ TRIANGULAR FIBROCARTILAGE REPAIR       Family History   Problem Relation Name Age of Onset    Cancer Maternal Grandmother      Heart disease Maternal Grandmother      Leukemia Maternal Grandmother      Hyperlipidemia Father      Hypertension Father      Diabetes Father      Breast cancer Maternal Aunt      Ovarian cancer Maternal Aunt       Social History     Tobacco Use    Smoking status: Never    Smokeless tobacco: Never   Substance Use Topics    Alcohol use: Yes     Comment: occasionally    Drug use: No     Review of Systems   Constitutional: Negative.    HENT: Negative.     Eyes: Negative.    Respiratory: Negative.     Cardiovascular: Negative.  Negative for chest pain.   Gastrointestinal:  Positive for nausea.   Endocrine: Negative.    Genitourinary: Negative.    Musculoskeletal: Negative.    Skin: Negative.    Allergic/Immunologic: Negative.    Neurological:  Positive for dizziness.   Hematological: Negative.     Psychiatric/Behavioral: Negative.     All other systems reviewed and are negative.      Physical Exam     Initial Vitals [05/07/24 2051]   BP Pulse Resp Temp SpO2   (!) 161/102 91 18 98.2 °F (36.8 °C) 97 %      MAP       --         Physical Exam    Nursing note and vitals reviewed.  Constitutional: She appears well-developed and well-nourished.   HENT:   Head: Normocephalic and atraumatic.   Nose: Nose normal.   Mouth/Throat: Oropharynx is clear and moist.   Eyes: Conjunctivae and EOM are normal. Pupils are equal, round, and reactive to light.   Neck: Neck supple. No thyromegaly present. No tracheal deviation present. No JVD present.   Normal range of motion.  Cardiovascular:  Normal rate, regular rhythm, normal heart sounds and intact distal pulses.           No murmur heard.  Pulmonary/Chest: Breath sounds normal. No stridor. No respiratory distress. She has no wheezes. She has no rales.   Abdominal: Abdomen is soft. Bowel sounds are normal. She exhibits no distension. There is no abdominal tenderness.   Musculoskeletal:         General: No edema. Normal range of motion.      Cervical back: Normal range of motion and neck supple.     Neurological: She is alert and oriented to person, place, and time. She has normal strength. GCS score is 15. GCS eye subscore is 4. GCS verbal subscore is 5. GCS motor subscore is 6.   Skin: Skin is warm. Capillary refill takes less than 2 seconds.   Psychiatric: She has a normal mood and affect. Thought content normal.         ED Course   Procedures  Labs Reviewed   CBC W/ AUTO DIFFERENTIAL - Abnormal; Notable for the following components:       Result Value    MCV 79 (*)     MCH 26.7 (*)     All other components within normal limits   COMPREHENSIVE METABOLIC PANEL - Abnormal; Notable for the following components:    Glucose 145 (*)     All other components within normal limits   URINALYSIS, REFLEX TO URINE CULTURE - Abnormal; Notable for the following components:    Appearance,  UA Hazy (*)     Specific Gravity, UA >1.030 (*)     Protein, UA 1+ (*)     Occult Blood UA 3+ (*)     All other components within normal limits    Narrative:     Specimen Source->Urine   URINALYSIS MICROSCOPIC - Abnormal; Notable for the following components:    RBC, UA 19 (*)     WBC, UA 18 (*)     All other components within normal limits    Narrative:     Specimen Source->Urine   CULTURE, URINE   POCT URINE PREGNANCY     EKG Readings: (Independently Interpreted)   Initial Reading: No STEMI. Rhythm: Normal Sinus Rhythm. Ectopy: No Ectopy. Conduction: Normal.       Imaging Results              CT Head Without Contrast (Final result)  Result time 05/08/24 02:09:19      Final result by Celestina Haywood MD (05/08/24 02:09:19)                   Impression:      No acute abnormality.      Electronically signed by: Celestina Haywood  Date:    05/08/2024  Time:    02:09               Narrative:    EXAMINATION:  CT HEAD WITHOUT CONTRAST    CLINICAL HISTORY:  Dizziness, persistent/recurrent, cardiac or vascular cause suspected;    TECHNIQUE:  Low dose axial CT images obtained throughout the head without intravenous contrast. Sagittal and coronal reconstructions were performed.    COMPARISON:  None.    FINDINGS:  Intracranial compartment:    Ventricles and sulci are normal in size for age without evidence of hydrocephalus. No extra-axial blood or fluid collections.    The brain parenchyma appears normal. No parenchymal mass, hemorrhage, edema or major vascular distribution infarct.    Skull/extracranial contents (limited evaluation): No fracture. Mastoid air cells and paranasal sinuses are essentially clear.                                    X-Rays:   Independently Interpreted Readings:   Other Readings:  CT head unremarkable    Medications   diphenhydrAMINE (BENADRYL) 50 mg/mL injection (has no administration in time range)   meclizine tablet 25 mg (25 mg Oral Given 5/7/24 3046)   ciprofloxacin (CIPRO)400mg/200ml  D5W IVPB 400 mg (0 mg Intravenous Stopped 5/8/24 0153)   acetaminophen tablet 1,000 mg (1,000 mg Oral Given 5/8/24 0127)   diphenhydrAMINE injection 25 mg (25 mg Intravenous Given 5/8/24 0159)   famotidine (PF) injection 20 mg (20 mg Intravenous Given 5/8/24 0204)   methylPREDNISolone sodium succinate injection 125 mg (125 mg Intravenous Given 5/8/24 0201)     Medical Decision Making  34-year-old female with intractable dizziness and occasional nausea and treated for vertigo and patient did have improvement of symptoms.  However symptoms ongoing for several weeks so CT of the head done which is unremarkable and patient felt better.  Patient did have slight allergic reaction to ciprofloxacin given for urinary tract infection and treated with Benadryl and patient had resolution of symptoms and as feeling well and as tolerating oral fluids and symptoms almost resolved, discharged with return precautions and instructions and follow-up    Amount and/or Complexity of Data Reviewed  Labs: ordered. Decision-making details documented in ED Course.  Radiology: ordered. Decision-making details documented in ED Course.  ECG/medicine tests: ordered. Decision-making details documented in ED Course.    Risk  OTC drugs.  Prescription drug management.                                      Clinical Impression:  Final diagnoses:  [R00.2] Palpitations  [R42] Vertigo (Primary)  [R42] Dizziness  [N39.0] Urinary tract infection without hematuria, site unspecified          ED Disposition Condition    Discharge Stable          ED Prescriptions       Medication Sig Dispense Start Date End Date Auth. Provider    meclizine (ANTIVERT) 25 mg tablet Take 1 tablet (25 mg total) by mouth 3 (three) times daily as needed. 20 tablet 5/8/2024 -- Moises De La Torre MD    nitrofurantoin, macrocrystal-monohydrate, (MACROBID) 100 MG capsule Take 1 capsule (100 mg total) by mouth 2 (two) times daily. for 5 days 10 capsule 5/8/2024 5/13/2024 Moises De La Torre MD     ondansetron (ZOFRAN) 4 MG tablet Take 1 tablet (4 mg total) by mouth every 6 (six) hours as needed. 20 tablet 5/8/2024 5/8/2025 Moises De La Torre MD          Follow-up Information       Follow up With Specialties Details Why Contact Info    [facility]  In 2 days  [address] [facility] [address]  [phone]               Moises De La Torre MD  05/08/24 0314

## 2024-05-08 NOTE — FIRST PROVIDER EVALUATION
Emergency Department TeleTriage Encounter Note      CHIEF COMPLAINT    Chief Complaint   Patient presents with    Dizziness    Palpitations     Patient c/o dizziness and palpitations that began x 1 hour ago.       VITAL SIGNS   Initial Vitals [05/07/24 2051]   BP Pulse Resp Temp SpO2   (!) 161/102 91 18 98.2 °F (36.8 °C) 97 %      MAP       --            ALLERGIES    Review of patient's allergies indicates:   Allergen Reactions    Keflex [cephalexin]      rash    Latex, natural rubber Hives       PROVIDER TRIAGE NOTE  TeleTriage Note: Patricia James, a nontoxic/well appearing, 34 y.o. female, presented to the ED with c/o dizziness that began around noon today and she hears a swooshing sound in her right ear. She then began to have palpitations about an hour ago. Denies chest pain or SOB. No other complaints.     All ED beds are full at present; patient notified of this status.  Patient seen and medically screened by Nurse Practitioner via teletriage. Orders initiated at triage to expedite care.  Patient is stable to return to the waiting room and will be placed in an ED bed when available.  Care will be transferred to an alternate provider when patient has been placed in an Exam Room from the [facility] for physical exam, additional orders, and disposition.  9:23 PM Maria uEgenia Perea, LUNA, FNP-C        ORDERS  Labs Reviewed   CBC W/ AUTO DIFFERENTIAL   COMPREHENSIVE METABOLIC PANEL   URINALYSIS, REFLEX TO URINE CULTURE   POCT URINE PREGNANCY       ED Orders (720h ago, onward)      Start Ordered     Status Ordering Provider    05/07/24 2130 05/07/24 2124  meclizine tablet 25 mg  ED 1 Time         Ordered MARIA EUGENIA PEREA    05/07/24 2123 05/07/24 2122  EKG 12-lead  Once         Ordered MARIA EUGENIA PEREA    05/07/24 2123 05/07/24 2122  CBC auto differential  STAT         Ordered MARIA EUGENIA PEREA    05/07/24 2123 05/07/24 2122  Comprehensive metabolic panel  STAT         Ordered MARIA EUGENIA PEREA    05/07/24  2123 05/07/24 2122  Insert peripheral IV  Continuous         Ordered BRIT GYPSY MCGILLRosalina    05/07/24 2123 05/07/24 2122  Urinalysis, Reflex to Urine Culture Urine, Clean Catch  STAT         Ordered BRITGYPSY    05/07/24 2123 05/07/24 2122  POCT urine pregnancy  Once         Ordered BRITGYPSY              Virtual Visit Note: The provider triage portion of this emergency department evaluation and documentation was performed via [employer], a HIPAA-compliant telemedicine application, in concert with a tele-presenter in the room. A face to face patient evaluation with one of my colleagues will occur once the patient is placed in an emergency department room.      DISCLAIMER: This note was prepared with [employer] voice recognition transcription software. Garbled syntax, mangled pronouns, and other bizarre constructions may be attributed to that software system.

## 2024-05-08 NOTE — DISCHARGE INSTRUCTIONS
Drink lots of fluids.  Rest.  Return to emergency department for worsening symptoms or any problems.  Take Benadryl for itching as needed.  Return to emergency department for worsening symptoms.  Do not drive if you have dizziness or vertigo.  Follow-up with primary care provider further evaluation and treatment

## 2024-05-08 NOTE — ED NOTES
At 0153, Ct informed me of pt stating she was starting to itch. Upon assessing the pt, she had red welts on her back and chest. Pt also was complaining of itching on her right arm. Cipro was stopped and MD was notified of the allergic reaction to the abx. Meds were administered via verbal order and pt states the itching decreased.

## 2024-05-09 LAB
BACTERIA UR CULT: NORMAL
BACTERIA UR CULT: NORMAL

## 2024-06-04 LAB
OHS QRS DURATION: 80 MS
OHS QTC CALCULATION: 440 MS

## 2024-06-18 ENCOUNTER — HOSPITAL ENCOUNTER (EMERGENCY)
Facility: HOSPITAL | Age: 34
Discharge: HOME | End: 2024-06-18
Attending: EMERGENCY MEDICINE
Payer: MEDICAID

## 2024-06-18 VITALS
TEMPERATURE: 98 F | WEIGHT: 220 LBS | BODY MASS INDEX: 40.48 KG/M2 | HEIGHT: 62 IN | OXYGEN SATURATION: 98 % | DIASTOLIC BLOOD PRESSURE: 81 MMHG | SYSTOLIC BLOOD PRESSURE: 128 MMHG | RESPIRATION RATE: 18 BRPM | HEART RATE: 84 BPM

## 2024-06-18 DIAGNOSIS — S62.101A AVULSION FRACTURE OF RIGHT WRIST: Primary | ICD-10-CM

## 2024-06-18 PROCEDURE — 29125 APPL SHORT ARM SPLINT STATIC: CPT | Mod: RT

## 2024-06-18 PROCEDURE — 99284 EMERGENCY DEPT VISIT MOD MDM: CPT | Mod: 25

## 2024-06-18 NOTE — ED PROVIDER NOTES
"Source of History:  Patient, chart    Chief complaint:  Wrist Injury (Right wrist)      HPI:  Patricia James is a 34 y.o. female with medical history of diabetes presenting with  right wrist pain.  Patient states that she has had multiple surgeries on her right forearm and wrist.  Patient states that she was pulling on her wrist and felt a pop and noticed something protruding from under her skin.  Patient denies any hardware in her right wrist.  Patient states she has pain with palpation and shoots pain up in her right forearm.  Patient denies any numbness or tingling.  No decreased range of motion.      This is the extent to the patients complaints today here in the emergency department.    ROS: As per HPI and below:  Constitutional: No fever.  No chills.  Eyes: No visual changes.   ENT: No sore throat. No ear pain.  Urinary: No abnormal urination.  MSK:  Positive for right wrist pain  Integument: No rashes or lesions.    Review of patient's allergies indicates:   Allergen Reactions    Ciprofloxacin Itching and Rash    Keflex [cephalexin]      rash    Latex, natural rubber Hives       PMH:  As per HPI and below:  Past Medical History:   Diagnosis Date    Diabetes mellitus     Renal disorder     kidney stone     Past Surgical History:   Procedure Laterality Date     SECTION      x 2    VULVECTOMY Left 2022    Procedure: VULVECTOMY, EXCISION OF VULVAR MASS AND OTHER INDICATED PROCEDURES;  Surgeon: Miguel Ángel Arrington MD;  Location: [facility];  Service: OB/GYN;  Laterality: Left;    WRIST ARTHROSCOPY W/ TRIANGULAR FIBROCARTILAGE REPAIR         Social History     Tobacco Use    Smoking status: Never    Smokeless tobacco: Never   Substance Use Topics    Alcohol use: Yes     Comment: occasionally    Drug use: No       Physical Exam:    /87 (BP Location: Right arm, Patient Position: Sitting)   Pulse 93   Temp 98.1 °F (36.7 °C)   Resp 18   Ht 5' 2" (1.575 m)   Wt 99.8 kg (220 lb)   LMP " 06/11/2024   SpO2 95%   Breastfeeding No   BMI 40.24 kg/m²   Nursing note and vital signs reviewed.  Constitutional: No acute distress.  Nontoxic  Head:  Normocephalic atraumatic  Eyes: No conjunctival injection.  Extraocular muscles are intact.  ENT: Normal phonation.  Musculoskeletal:  Point tenderness to palpation over right ulnar styloid.  Extension flexion of right wrist within normal limits.  Strength 5/5 in right hand.  Plus two radial pulse noted.  Skin: No rashes seen.  Good turgor.  No abrasions.  No ecchymoses.  Psych: Appropriate, conversant.    MDM    Emergent evaluation of a 35 yo female presenting for right wrist injury.  Patient states pain started after pulling on her right wrist.  Patient states she felt a pop and noticed a protrusion over her wrist.  Patient has histories of multiple surgeries to right wrist and forearm and is concerned for complication.  Patient denies any numbness or tingling.  No decreased strength or decreased range of motion.  On exam pt is A&Ox3. VSS. Nonfebrile and nontoxic appearing.  Mucous membranes pink and moist. Pt speaking in full sentences.  Point tenderness to palpation over right ulnar styloid.  Extension flexion of right wrist within normal limits.  Strength 5/5 in right hand.  Plus two radial pulse noted.  Steady gait appreciated. Cap refill < 3 seconds.      History Acquisition   Additional history was acquired from other historians.  Chart  The patient's list of active medical problems, social history, medications, and allergies as documented per RN staff has been reviewed.     Differential Diagnoses   Based on available information and the initial assessment, the working differential diagnoses considered during this evaluation include but are not limited to sprain, strain, contusion, abrasion, fracture, dislocation, ligament injury, tendon injury, postop complication, others.    I will get imaging and reassess.      LABS   Labs Reviewed - No data to  display      Imaging     Imaging Results              CT Wrist Without Contrast Right (Final result)  Result time 06/18/24 15:09:13      Final result by Sawyer Rea Jr., MD (06/18/24 15:09:13)                   Impression:      1-2 mm soft tissue calcification is seen adjacent to the ulnar styloid.  If this is the site of pain a avulsion fragment is suspected.  Otherwise this is a soft tissue calcification without a fracture identified of the ulnar styloid, ulna, radius, carpals or metacarpals.  A sclerotic bone density in the radial styloid is consistent with a benign bone island.      Electronically signed by: Sawyer Rea MD  Date:    06/18/2024  Time:    15:09               Narrative:    EXAMINATION:  CT WRIST WITHOUT CONTRAST RIGHT    CLINICAL HISTORY:  Wrist pain, occult fracture suspected, nondiagnostic xray;    TECHNIQUE:  Fine detail axial images are obtained through the right wrist and displayed at soft tissue and bone windows.  Sagittal and coronal reconstructions are provided.    COMPARISON:  Plain x-rays of June 18, 2024    FINDINGS:  A fracture of the radius, ulna, carpals or metacarpals is not seen.  Subluxation or dislocation of the carpals is not demonstrated.  A 3 mm sclerotic density in the radial styloid is a probable benign bone island.  Other sclerotic densities are not identified.  A joint effusion is not identified.  Soft tissue swelling is not seen.    In the soft tissues adjacent to the ulnar styloid is a tiny 1-2 mm calcific density.  This may represent a soft tissue calcification.  Clinical correlation is recommended to determine if this is the site of pain in which case a small avulsion fragment may be present                                       X-Ray Wrist Complete Right (Final result)  Result time 06/18/24 13:42:01      Final result by Sawyer Rea Jr., MD (06/18/24 13:42:01)                   Impression:      Negative plain x-rays of the right  wrist.      Electronically signed by: Sawyer Rea MD  Date:    06/18/2024  Time:    13:42               Narrative:    EXAMINATION:  XR WRIST COMPLETE 3 VIEWS RIGHT    CLINICAL HISTORY:  right wrist injury;    TECHNIQUE:  PA, lateral, and oblique views of the right wrist were performed.    COMPARISON:  Right wrist x-ray of March 17, 2024    FINDINGS:  A fracture of the radius, ulna, carpals or metacarpals is not seen.  Subluxation or dislocation of the carpals is not seen.  Soft tissue swelling is not noted.                                    A radiology report was available for my review at the time of the encounter.    Additional Consideration   All available testing was considered during the course of this workup.  Comorbidities taken into consideration during the patient's evaluation and treatment include weight, age.    Social determinants of health were taken into consideration during development of our treatment plan.    Medications - No data to display   ED Course as of 06/18/24 1542   Tue Jun 18, 2024   1400 X-ray independently reviewed by myself and Radiology.  Negative for any acute abnormalities.  Due to symptoms will get CT to rule out any acute abnormalities.  Awaiting results. [RZ]   1517 CT independently reviewed by myself and radiology.  CT shows 1-2 mm soft tissue calcification is seen adjacent to the ulnar styloid. Concern for avulsion fragment.  Will place in a Velcro splint and have patient follow up with her orthopedic specialist. [RZ]   1533 Patient is hemodynamically stable, vital signs are normal. Discharge instructions given. Return to ED precautions discussed. Follow up as directed. Pt verbalized understanding of this plan.  Pt is stable for discharge.  [RZ]      ED Course User Index  [RZ] Meredith Bell NP            Attending Attestation:     Physician Attestation Statement for NP/PA:       Other NP/PA Attestation Additions:    History of Present Illness: 34-year-old female  presented for evaluation of wrist pain.    Medical Decision Making: Initial differential diagnosis included but not limited to fracture, dislocation, and tendinitis.  I am in agreement with the nurse practitioner's assessment, treatment, and plan of care.             CLINICAL IMPRESSION  1. Avulsion fracture of right wrist         ED Disposition Condition    Discharge Stable            Instruction:  I see no indication of an emergent process beyond that addressed during our encounter but have duly counseled the patient/family regarding the need for prompt follow-up as well as the indications that should prompt immediate return to the emergency room should new or worrisome developments occur.  The patient/family has been provided with verbal and printed direction regarding our final diagnosis(es) as well as instructions regarding use of OTC and/or Rx medications intended to manage the patient's aforementioned conditions including:  ED Prescriptions    None       Patient has been advised of following recommended follow-up instructions:  Follow-up Information       Follow up With Specialties Details Why Contact Info    Josee Gordon MD  Schedule an appointment as soon as possible for a visit  for ED follow up [address] [place] [id number]  [phone]            The patient/family communicates understanding of all this information and all remaining questions and concerns were addressed at this time.      The patient's condition did not warrant review of the  and prescription of controlled substances.      This note was created using dictation software.  This program may occasionally mistype words and phrases.         Meredith Bell NP  06/18/24 1530       Ramiro Snyder MD  06/18/24 0716

## 2024-06-18 NOTE — DISCHARGE INSTRUCTIONS
You were seen and evaluated in the ER today.  Your CT is concerning for an avulsion fracture.  We have placed you in a Velcro splint and recommend follow-up with your orthopedic specialist.  We have placed a referral for orthopedic follow-up at [facility].  Please follow-up with your PCP as needed.  Please return to the ED for any worsening symptoms such as chest pain, shortness of breath, fever not controlled with Tylenol or ibuprofen or uncontrolled pain.      Our goal in the emergency department is to always give you outstanding care and exceptional service. You may receive a survey by mail or e-mail in the next week regarding your experience in our ED. We would greatly appreciate your completing and returning the survey. Your feedback provides us with a way to recognize our staff who give very good care and it helps us learn how to improve when your experience was below our aspiration of excellence.       If you would like to follow up with the [facility] for further care of your avulsion fracture, please call the [facility] Scheduling Department at [phone] during business hours.  Please let the  know you need an ED follow-up appointment with Orthopedics, and you will be scheduled in the Orthopedic Clinic.  Please bring your original Emergency Department discharge papers with  you to the clinic appointment.

## 2024-06-18 NOTE — ED NOTES
Pt is a 34 y.o female presenting in the er for pain and localized swelling to R wrist. Pt reports stretching arm out and feeling a pop.

## 2024-07-03 ENCOUNTER — HOSPITAL ENCOUNTER (EMERGENCY)
Facility: HOSPITAL | Age: 34
Discharge: HOME | End: 2024-07-03
Attending: EMERGENCY MEDICINE
Payer: MEDICAID

## 2024-07-03 VITALS
TEMPERATURE: 98 F | WEIGHT: 220 LBS | RESPIRATION RATE: 17 BRPM | BODY MASS INDEX: 40.48 KG/M2 | HEART RATE: 85 BPM | HEIGHT: 62 IN | OXYGEN SATURATION: 96 % | DIASTOLIC BLOOD PRESSURE: 91 MMHG | SYSTOLIC BLOOD PRESSURE: 135 MMHG

## 2024-07-03 DIAGNOSIS — R10.30 LOWER ABDOMINAL PAIN: Primary | ICD-10-CM

## 2024-07-03 LAB
ANION GAP SERPL CALC-SCNC: 9 MMOL/L (ref 8–16)
B-HCG UR QL: NEGATIVE
BACTERIA GENITAL QL WET PREP: ABNORMAL
BASOPHILS # BLD AUTO: 0.07 K/UL (ref 0–0.2)
BASOPHILS NFR BLD: 0.8 % (ref 0–1.9)
BILIRUB UR QL STRIP: NEGATIVE
BUN SERPL-MCNC: 9 MG/DL (ref 6–20)
CALCIUM SERPL-MCNC: 9.4 MG/DL (ref 8.7–10.5)
CHLORIDE SERPL-SCNC: 106 MMOL/L (ref 95–110)
CLARITY UR: CLEAR
CLUE CELLS VAG QL WET PREP: ABNORMAL
CO2 SERPL-SCNC: 22 MMOL/L (ref 23–29)
COLOR UR: YELLOW
CREAT SERPL-MCNC: 0.6 MG/DL (ref 0.5–1.4)
CTP QC/QA: YES
DIFFERENTIAL METHOD BLD: ABNORMAL
EOSINOPHIL # BLD AUTO: 0.2 K/UL (ref 0–0.5)
EOSINOPHIL NFR BLD: 2.7 % (ref 0–8)
ERYTHROCYTE [DISTWIDTH] IN BLOOD BY AUTOMATED COUNT: 14.6 % (ref 11.5–14.5)
EST. GFR  (NO RACE VARIABLE): >60 ML/MIN/1.73 M^2
FILAMENT FUNGI VAG WET PREP-#/AREA: ABNORMAL
GLUCOSE SERPL-MCNC: 178 MG/DL (ref 70–110)
GLUCOSE UR QL STRIP: NEGATIVE
HCT VFR BLD AUTO: 41.3 % (ref 37–48.5)
HGB BLD-MCNC: 13.6 G/DL (ref 12–16)
HGB UR QL STRIP: NEGATIVE
IMM GRANULOCYTES # BLD AUTO: 0.11 K/UL (ref 0–0.04)
IMM GRANULOCYTES NFR BLD AUTO: 1.3 % (ref 0–0.5)
KETONES UR QL STRIP: NEGATIVE
LEUKOCYTE ESTERASE UR QL STRIP: NEGATIVE
LYMPHOCYTES # BLD AUTO: 2.3 K/UL (ref 1–4.8)
LYMPHOCYTES NFR BLD: 27.1 % (ref 18–48)
MCH RBC QN AUTO: 26.7 PG (ref 27–31)
MCHC RBC AUTO-ENTMCNC: 32.9 G/DL (ref 32–36)
MCV RBC AUTO: 81 FL (ref 82–98)
MONOCYTES # BLD AUTO: 0.6 K/UL (ref 0.3–1)
MONOCYTES NFR BLD: 7.1 % (ref 4–15)
NEUTROPHILS # BLD AUTO: 5.1 K/UL (ref 1.8–7.7)
NEUTROPHILS NFR BLD: 61 % (ref 38–73)
NITRITE UR QL STRIP: NEGATIVE
NRBC BLD-RTO: 0 /100 WBC
PH UR STRIP: 6 [PH] (ref 5–8)
PLATELET # BLD AUTO: 345 K/UL (ref 150–450)
PMV BLD AUTO: 10.3 FL (ref 9.2–12.9)
POTASSIUM SERPL-SCNC: 3.8 MMOL/L (ref 3.5–5.1)
PROT UR QL STRIP: ABNORMAL
RBC # BLD AUTO: 5.1 M/UL (ref 4–5.4)
SODIUM SERPL-SCNC: 137 MMOL/L (ref 136–145)
SP GR UR STRIP: 1.02 (ref 1–1.03)
SPECIMEN SOURCE: ABNORMAL
T VAGINALIS GENITAL QL WET PREP: ABNORMAL
URN SPEC COLLECT METH UR: ABNORMAL
UROBILINOGEN UR STRIP-ACNC: NEGATIVE EU/DL
WBC # BLD AUTO: 8.41 K/UL (ref 3.9–12.7)
WBC #/AREA VAG WET PREP: ABNORMAL
YEAST GENITAL QL WET PREP: ABNORMAL

## 2024-07-03 PROCEDURE — 63600175 PHARM REV CODE 636 W HCPCS: Performed by: EMERGENCY MEDICINE

## 2024-07-03 PROCEDURE — 81025 URINE PREGNANCY TEST: CPT | Performed by: EMERGENCY MEDICINE

## 2024-07-03 PROCEDURE — 87210 SMEAR WET MOUNT SALINE/INK: CPT | Performed by: EMERGENCY MEDICINE

## 2024-07-03 PROCEDURE — 80048 BASIC METABOLIC PNL TOTAL CA: CPT | Performed by: EMERGENCY MEDICINE

## 2024-07-03 PROCEDURE — 81003 URINALYSIS AUTO W/O SCOPE: CPT | Performed by: EMERGENCY MEDICINE

## 2024-07-03 PROCEDURE — 85025 COMPLETE CBC W/AUTO DIFF WBC: CPT | Performed by: EMERGENCY MEDICINE

## 2024-07-03 PROCEDURE — 87591 N.GONORRHOEAE DNA AMP PROB: CPT | Performed by: EMERGENCY MEDICINE

## 2024-07-03 PROCEDURE — 87205 SMEAR GRAM STAIN: CPT | Performed by: EMERGENCY MEDICINE

## 2024-07-03 PROCEDURE — 96374 THER/PROPH/DIAG INJ IV PUSH: CPT

## 2024-07-03 PROCEDURE — 99285 EMERGENCY DEPT VISIT HI MDM: CPT | Mod: 25

## 2024-07-03 PROCEDURE — 87491 CHLMYD TRACH DNA AMP PROBE: CPT | Performed by: EMERGENCY MEDICINE

## 2024-07-03 PROCEDURE — 87081 CULTURE SCREEN ONLY: CPT | Performed by: EMERGENCY MEDICINE

## 2024-07-03 RX ORDER — KETOROLAC TROMETHAMINE 30 MG/ML
10 INJECTION, SOLUTION INTRAMUSCULAR; INTRAVENOUS
Status: COMPLETED | OUTPATIENT
Start: 2024-07-03 | End: 2024-07-03

## 2024-07-03 RX ADMIN — KETOROLAC TROMETHAMINE 10 MG: 30 INJECTION, SOLUTION INTRAMUSCULAR; INTRAVENOUS at 11:07

## 2024-07-03 NOTE — DISCHARGE INSTRUCTIONS
Incidental enlarged spleen noted on imaging that may be discussed with your PCP for long-term follow-up for monitoring if necessary.

## 2024-07-03 NOTE — ED PROVIDER NOTES
Chief complaint:  Abdominal Pain (X few weeks. Denies n/v/d)      HPI:  Patricia James is a 34 y.o. female with hx nephrolithiasis, DM presenting with 2 weeks of constant, unchanging suprapubic abdominal pain with occasional radiation to the left lower back, worse with certain movements.  She has an upcoming appointment scheduled with her gynecologist because she suspects this is uterine in nature.  Last menstrual period was 2 weeks ago and was normal.  She denies any concurrent vaginal bleeding or discharge.  No pelvic pain.  No rash.  No urinary complaints such as urinary frequency, urgency, dysuria, flank pain.  No associated vomiting or diarrhea.  No change in bowel movements or blood in the stools.  No associated systemic symptoms such as fever.  She denies similar history of the same pain.    ROS: As per HPI and below:  No chest pain, dyspnea, leg pain.    Review of patient's allergies indicates:   Allergen Reactions    Ciprofloxacin Itching and Rash    Keflex [cephalexin]      rash    Latex, natural rubber Hives       Discharge Medication List as of 7/3/2024  2:28 PM        CONTINUE these medications which have NOT CHANGED    Details   albuterol (PROVENTIL HFA) 90 mcg/actuation inhaler Inhale 2 puffs into the lungs every 6 (six) hours as needed for Wheezing. Rescue, Starting Mon 2/5/2024, Until Wed 3/6/2024 at 2359, Normal      azithromycin (Z-CHAVO) 250 MG tablet Take 2 tablets by mouth on day 1; Take 1 tablet by mouth on days 2-5, Normal      cetirizine (ZYRTEC) 10 MG tablet Take 1 tablet (10 mg total) by mouth once daily., Starting Tue 7/25/2023, Normal      diclofenac (VOLTAREN) 75 MG EC tablet Take 1 tablet (75 mg total) by mouth 2 (two) times daily., Starting Sun 3/17/2024, Print      meclizine (ANTIVERT) 25 mg tablet Take 1 tablet (25 mg total) by mouth 3 (three) times daily as needed., Starting Wed 5/8/2024, Print      metFORMIN (GLUCOPHAGE-XR) 500 MG ER 24hr tablet Take 1 tablet (500 mg total)  by mouth daily with breakfast., Starting 2022, Until 2023, Normal      ondansetron (ZOFRAN) 4 MG tablet Take 1 tablet (4 mg total) by mouth every 6 (six) hours as needed., Starting 2024, Until 2025 at 2359, Print             PMH:  As per HPI and below:  Past Medical History:   Diagnosis Date    Diabetes mellitus     Renal disorder     kidney stone     Past Surgical History:   Procedure Laterality Date     SECTION      x 2    VULVECTOMY Left 2022    Procedure: VULVECTOMY, EXCISION OF VULVAR MASS AND OTHER INDICATED PROCEDURES;  Surgeon: Miguel Ángel Arrington MD;  Location: [facility] OR;  Service: OB/GYN;  Laterality: Left;    WRIST ARTHROSCOPY W/ TRIANGULAR FIBROCARTILAGE REPAIR         Social History     Socioeconomic History    Marital status:    Tobacco Use    Smoking status: Never    Smokeless tobacco: Never   Substance and Sexual Activity    Alcohol use: Yes     Comment: occasionally    Drug use: No    Sexual activity: Not Currently     Partners: Male     Birth control/protection: Partner-Vasectomy     Social Determinants of Health     Financial Resource Strain: Low Risk  (2023)    Overall Financial Resource Strain (CARDIA)     Difficulty of Paying Living Expenses: Not very hard   Food Insecurity: No Food Insecurity (2023)    Hunger Vital Sign     Worried About Running Out of Food in the Last Year: Never true     Ran Out of Food in the Last Year: Never true   Transportation Needs: No Transportation Needs (2023)    PRAPARE - Transportation     Lack of Transportation (Medical): No     Lack of Transportation (Non-Medical): No   Stress: No Stress Concern Present (2023)    [employer] [place] [employer] - Occupational Stress Questionnaire     Feeling of Stress : Not at all   Housing Stability: Unknown (2023)    Housing Stability Vital Sign     Unable to Pay for Housing in the Last Year: No     Unstable Housing in the Last Year: No        Family History   Problem Relation Name Age of Onset    Cancer Maternal Grandmother      Heart disease Maternal Grandmother      Leukemia Maternal Grandmother      Hyperlipidemia Father      Hypertension Father      Diabetes Father      Breast cancer Maternal Aunt      Ovarian cancer Maternal Aunt         Physical Exam:    Vitals:    07/03/24 1442   BP: (!) 135/91   Pulse: 85   Resp: 17   Temp:      GENERAL:  No apparent distress.  Alert.    HEENT:  Moist mucous membranes.  Normocephalic and atraumatic.    NECK:  No swelling.  Midline trachea.   CARDIOVASCULAR:  Regular rate and rhythm.  2+ radial pulses.  No murmur.  PULMONARY:  Lungs clear to auscultation bilaterally.  No wheezes, rales, or rhonci.    ABDOMEN:  Mild suprapubic abdominal tenderness with no voluntary or involuntary guarding.  No distention, masses, palpable hernias.  No tenderness at McBurney's point.  No organomegaly.  EXTREMITIES:  Warm and well perfused.  Brisk capillary refill.  No peripheral edema.  NEUROLOGICAL:  Normal mental status.  Appropriate and conversant.    SKIN:  No rashes or ecchymoses.    BACK:  Mild left CVA tenderness present.  No midline or right CVA tenderness.      Labs Reviewed   VAGINAL SCREEN - Abnormal; Notable for the following components:       Result Value    WBC - Vaginal Screen Few (*)     Bacteria - Vaginal Screen Few (*)     All other components within normal limits    Narrative:     Release to patient->Immediate   CBC W/ AUTO DIFFERENTIAL - Abnormal; Notable for the following components:    MCV 81 (*)     MCH 26.7 (*)     RDW 14.6 (*)     Immature Granulocytes 1.3 (*)     Immature Grans (Abs) 0.11 (*)     All other components within normal limits   BASIC METABOLIC PANEL - Abnormal; Notable for the following components:    CO2 22 (*)     Glucose 178 (*)     All other components within normal limits   URINALYSIS, REFLEX TO URINE CULTURE - Abnormal; Notable for the following components:    Protein, UA Trace (*)      All other components within normal limits    Narrative:     Specimen Source->Urine   CULTURE, GONOCOCCUS   C. TRACHOMATIS/N. GONORRHOEAE BY AMP DNA   POCT URINE PREGNANCY       Discharge Medication List as of 7/3/2024  2:28 PM        CONTINUE these medications which have NOT CHANGED    Details   albuterol (PROVENTIL HFA) 90 mcg/actuation inhaler Inhale 2 puffs into the lungs every 6 (six) hours as needed for Wheezing. Rescue, Starting Mon 2/5/2024, Until Wed 3/6/2024 at 2359, Normal      azithromycin (Z-CHAVO) 250 MG tablet Take 2 tablets by mouth on day 1; Take 1 tablet by mouth on days 2-5, Normal      cetirizine (ZYRTEC) 10 MG tablet Take 1 tablet (10 mg total) by mouth once daily., Starting Tue 7/25/2023, Normal      diclofenac (VOLTAREN) 75 MG EC tablet Take 1 tablet (75 mg total) by mouth 2 (two) times daily., Starting Sun 3/17/2024, Print      meclizine (ANTIVERT) 25 mg tablet Take 1 tablet (25 mg total) by mouth 3 (three) times daily as needed., Starting Wed 5/8/2024, Print      metFORMIN (GLUCOPHAGE-XR) 500 MG ER 24hr tablet Take 1 tablet (500 mg total) by mouth daily with breakfast., Starting Wed 11/30/2022, Until Thu 11/30/2023, Normal      ondansetron (ZOFRAN) 4 MG tablet Take 1 tablet (4 mg total) by mouth every 6 (six) hours as needed., Starting Wed 5/8/2024, Until Thu 5/8/2025 at 2359, Print             Orders Placed This Encounter   Procedures    Vaginal Screen    Gonococcus culture    CT Abdomen Pelvis  Without Contrast    C. trachomatis/N. gonorrhoeae by AMP DNA [facility]; Vagina    CBC auto differential    Basic metabolic panel (BMP)    Urinalysis, Reflex to Urine Culture Urine, Clean Catch    Setup Pelvic Tray    POCT urine pregnancy    Insert Saline lock IV       Imaging Results              CT Abdomen Pelvis  Without Contrast (Final result)  Result time 07/03/24 13:46:34      Final result by Hyun Obregon MD (07/03/24 13:46:34)                   Impression:      Stable mild  splenomegaly    No evidence of renal stones or hydronephrosis    No acute abdominal or pelvic process      Electronically signed by: Hyun Obregon  Date:    07/03/2024  Time:    13:46               Narrative:      CMS MANDATED QUALITY DATA - CT RADIATION - 436    All CT scans at this facility utilize dose modulation, iterative reconstruction, and/or weight based dosing when appropriate to reduce radiation dose to as low as reasonably achievable.    CLINICAL HISTORY:  ([id number])33 y/o  (1990) F    Left flank pain    TECHNIQUE:  (A#[id number], exam time 7/3/2024 13:42)    CT ABDOMEN PELVIS WITHOUT CONTRAST [id number]    Axial CT images of the abdomen and pelvis were obtained from the dome of the diaphragm to the proximal thighs.    COMPARISON:  09/21/2022    FINDINGS:  Lower Thorax:    The lung bases are clear. The heart is normal in size.    CT Abdomen:    Liver: The liver is normal in size and imaging appearance.    Gallbladder: The gallbladder is within normal limits.    Biliary Tree: No intra or extrahepatic ductal dilation.    Spleen: The spleen is enlarged measuring 16 cm.  There is no splenic mass.    Pancreas: The pancreas is normal.    Adrenal Glands: Normal    Kidneys: The kidneys are normal in imaging appearance without hydronephrosis or hydroureter.    Vasculature: The aorta is normal in course and caliber.    Lymph nodes: No abdominal lymphadenopathy is seen.    Intraperitoneal structures: There is no ascites.    Bowel: The stomach is normal.  The small bowel is unremarkable.  The appendix is normal.  The colon is normal.    Abdominal wall: The abdominal wall and musculature are normal.    Musculoskeletal: Normal.    CT Pelvis:    Bladder: Normal.    Reproductive Organs: Uterus and ovaries are normal.    Pelvic Lymph nodes: No pelvic lymphadenopathy or pelvic mass is identified.                                  (Rad read)    ED Course as of 07/03/24 1815   Wed Jul 03, 2024   1145 GYNE:  Unremarkable  external genitalia.  Speculum examination only partial visualization of cervix secondary to body habitus but normal as inspected.  Nonspecific scant whitish discharge in vaginal vault.  Bimanual examination without cervical motion tenderness, adexal tenderness, or adnexal masses palpated.  Female staff chaperone was present for entirety of exam.   [MR]      ED Course User Index  [MR] Juan Gama MD       MDM:    34 y.o. female with 2 weeks of suprapubic abdominal pain accompanied by nausea, absent other complaints.  She denies prior history of the same.  Differential discussed with patient including obstructive uropathy.  Pelvic exam performed to exclude PID with low suspicion.  Urine pregnancy test sent to exclude pregnancy.  Less likely appendicitis, abscess, diverticulitis with CT ordered to further exclude.  Some light flank tenderness despite no urinary symptoms with urinalysis sent to exclude UTI.  Toradol given here for pain.  She has taken acetaminophen at home.  Workup reviewed including CT without acute process evident.  Exam is not consistent with PID.  She is appropriate for outpatient follow-up with Gynecology and PCP.  Detailed return precautions reviewed.    Diagnoses:    1. Suprapubic abdominal pain       Juan Gama MD  07/03/24 8582

## 2024-07-05 ENCOUNTER — HOSPITAL ENCOUNTER (EMERGENCY)
Facility: HOSPITAL | Age: 34
Discharge: HOME | End: 2024-07-05
Attending: EMERGENCY MEDICINE
Payer: MEDICAID

## 2024-07-05 VITALS
TEMPERATURE: 98 F | RESPIRATION RATE: 89 BRPM | WEIGHT: 220 LBS | BODY MASS INDEX: 40.48 KG/M2 | SYSTOLIC BLOOD PRESSURE: 123 MMHG | DIASTOLIC BLOOD PRESSURE: 82 MMHG | OXYGEN SATURATION: 97 % | HEIGHT: 62 IN | HEART RATE: 89 BPM

## 2024-07-05 DIAGNOSIS — M54.6 ACUTE LEFT-SIDED THORACIC BACK PAIN: Primary | ICD-10-CM

## 2024-07-05 LAB
ALBUMIN SERPL BCP-MCNC: 4.8 G/DL (ref 3.5–5.2)
ALP SERPL-CCNC: 64 U/L (ref 55–135)
ALT SERPL W/O P-5'-P-CCNC: 34 U/L (ref 10–44)
ANION GAP SERPL CALC-SCNC: 9 MMOL/L (ref 8–16)
AST SERPL-CCNC: 20 U/L (ref 10–40)
BASOPHILS # BLD AUTO: 0.06 K/UL (ref 0–0.2)
BASOPHILS NFR BLD: 0.7 % (ref 0–1.9)
BILIRUB SERPL-MCNC: 0.5 MG/DL (ref 0.1–1)
BILIRUB UR QL STRIP: NEGATIVE
BUN SERPL-MCNC: 11 MG/DL (ref 6–20)
CALCIUM SERPL-MCNC: 9.3 MG/DL (ref 8.7–10.5)
CHLAMYDIA, AMPLIFIED DNA: NEGATIVE
CHLORIDE SERPL-SCNC: 107 MMOL/L (ref 95–110)
CLARITY UR: ABNORMAL
CO2 SERPL-SCNC: 22 MMOL/L (ref 23–29)
COLOR UR: YELLOW
CREAT SERPL-MCNC: 0.6 MG/DL (ref 0.5–1.4)
DIFFERENTIAL METHOD BLD: ABNORMAL
EOSINOPHIL # BLD AUTO: 0.2 K/UL (ref 0–0.5)
EOSINOPHIL NFR BLD: 2.1 % (ref 0–8)
ERYTHROCYTE [DISTWIDTH] IN BLOOD BY AUTOMATED COUNT: 14.7 % (ref 11.5–14.5)
EST. GFR  (NO RACE VARIABLE): >60 ML/MIN/1.73 M^2
GLUCOSE SERPL-MCNC: 131 MG/DL (ref 70–110)
GLUCOSE UR QL STRIP: NEGATIVE
HCT VFR BLD AUTO: 39.4 % (ref 37–48.5)
HGB BLD-MCNC: 12.8 G/DL (ref 12–16)
HGB UR QL STRIP: NEGATIVE
IMM GRANULOCYTES # BLD AUTO: 0.07 K/UL (ref 0–0.04)
IMM GRANULOCYTES NFR BLD AUTO: 0.8 % (ref 0–0.5)
KETONES UR QL STRIP: NEGATIVE
LEUKOCYTE ESTERASE UR QL STRIP: NEGATIVE
LYMPHOCYTES # BLD AUTO: 1.9 K/UL (ref 1–4.8)
LYMPHOCYTES NFR BLD: 22.9 % (ref 18–48)
MCH RBC QN AUTO: 26.1 PG (ref 27–31)
MCHC RBC AUTO-ENTMCNC: 32.5 G/DL (ref 32–36)
MCV RBC AUTO: 80 FL (ref 82–98)
MONOCYTES # BLD AUTO: 0.5 K/UL (ref 0.3–1)
MONOCYTES NFR BLD: 6.3 % (ref 4–15)
N GONORRHOEAE, AMPLIFIED DNA: NEGATIVE
NEUTROPHILS # BLD AUTO: 5.6 K/UL (ref 1.8–7.7)
NEUTROPHILS NFR BLD: 67.2 % (ref 38–73)
NITRITE UR QL STRIP: NEGATIVE
NRBC BLD-RTO: 0 /100 WBC
PH UR STRIP: 6 [PH] (ref 5–8)
PLATELET # BLD AUTO: 307 K/UL (ref 150–450)
PMV BLD AUTO: 9.7 FL (ref 9.2–12.9)
POTASSIUM SERPL-SCNC: 4.2 MMOL/L (ref 3.5–5.1)
PROT SERPL-MCNC: 7.3 G/DL (ref 6–8.4)
PROT UR QL STRIP: NEGATIVE
RBC # BLD AUTO: 4.9 M/UL (ref 4–5.4)
SODIUM SERPL-SCNC: 138 MMOL/L (ref 136–145)
SP GR UR STRIP: 1.02 (ref 1–1.03)
URN SPEC COLLECT METH UR: ABNORMAL
UROBILINOGEN UR STRIP-ACNC: NEGATIVE EU/DL
WBC # BLD AUTO: 8.41 K/UL (ref 3.9–12.7)

## 2024-07-05 PROCEDURE — 96374 THER/PROPH/DIAG INJ IV PUSH: CPT

## 2024-07-05 PROCEDURE — 25000003 PHARM REV CODE 250: Performed by: NURSE PRACTITIONER

## 2024-07-05 PROCEDURE — 99284 EMERGENCY DEPT VISIT MOD MDM: CPT | Mod: 25

## 2024-07-05 PROCEDURE — 85025 COMPLETE CBC W/AUTO DIFF WBC: CPT | Performed by: NURSE PRACTITIONER

## 2024-07-05 PROCEDURE — 63600175 PHARM REV CODE 636 W HCPCS: Performed by: NURSE PRACTITIONER

## 2024-07-05 PROCEDURE — 80053 COMPREHEN METABOLIC PANEL: CPT | Performed by: NURSE PRACTITIONER

## 2024-07-05 PROCEDURE — 81003 URINALYSIS AUTO W/O SCOPE: CPT | Performed by: NURSE PRACTITIONER

## 2024-07-05 RX ORDER — LIDOCAINE 50 MG/G
1 PATCH TOPICAL
Status: DISCONTINUED | OUTPATIENT
Start: 2024-07-05 | End: 2024-07-05 | Stop reason: HOSPADM

## 2024-07-05 RX ORDER — TIZANIDINE 4 MG/1
4 TABLET ORAL EVERY 8 HOURS PRN
Qty: 20 TABLET | Refills: 0 | Status: SHIPPED | OUTPATIENT
Start: 2024-07-05 | End: 2024-07-15

## 2024-07-05 RX ORDER — IBUPROFEN 800 MG/1
800 TABLET ORAL EVERY 8 HOURS PRN
Qty: 20 TABLET | Refills: 0 | Status: SHIPPED | OUTPATIENT
Start: 2024-07-05

## 2024-07-05 RX ORDER — KETOROLAC TROMETHAMINE 30 MG/ML
10 INJECTION, SOLUTION INTRAMUSCULAR; INTRAVENOUS
Status: COMPLETED | OUTPATIENT
Start: 2024-07-05 | End: 2024-07-05

## 2024-07-05 RX ORDER — LIDOCAINE 50 MG/G
1 PATCH TOPICAL DAILY
Qty: 15 PATCH | Refills: 0 | Status: SHIPPED | OUTPATIENT
Start: 2024-07-05

## 2024-07-05 RX ADMIN — KETOROLAC TROMETHAMINE 10 MG: 30 INJECTION, SOLUTION INTRAMUSCULAR at 02:07

## 2024-07-05 RX ADMIN — LIDOCAINE 1 PATCH: 50 PATCH CUTANEOUS at 04:07

## 2024-07-05 NOTE — ED NOTES
Pt resting in bed in lowest and locked with rails x2. Call light within reach. Pt denied needs at this time. Pt continue to complain of pain, NP notified.

## 2024-07-05 NOTE — FIRST PROVIDER EVALUATION
Emergency Department TeleTriage Encounter Note      CHIEF COMPLAINT    Chief Complaint   Patient presents with    Flank Pain     LEFT, X FEW DAYS       VITAL SIGNS   Initial Vitals [07/05/24 1050]   BP Pulse Resp Temp SpO2   (!) 164/90 90 18 98.1 °F (36.7 °C) 97 %      MAP       --            ALLERGIES    Review of patient's allergies indicates:   Allergen Reactions    Ciprofloxacin Itching and Rash    Keflex [cephalexin]      rash    Latex, natural rubber Hives       PROVIDER TRIAGE NOTE  This is a teletriage evaluation of a 34 y.o. female presenting to the ED with c/o left flank pain for the past few days. Pt was seen on 7/3 and had a negative w/u.  Pt states pain has continued since that time.       PE: Distressed due to pain. VSS.      Plan: labs, monitor    Limited physical exam via telehealth: The patient is awake, alert, answering questions appropriately and is not in respiratory distress. All ED beds are full at present; patient notified of this status.  Patient seen and medically screened by Nurse Practitioner via teletriage.      Initial orders will be placed and care will be transferred to an alternate provider when patient is roomed for a full evaluation. Any additional orders and the final disposition will be determined by that provider.         ORDERS  Labs Reviewed   CBC W/ AUTO DIFFERENTIAL   COMPREHENSIVE METABOLIC PANEL   URINALYSIS, REFLEX TO URINE CULTURE       ED Orders (720h ago, onward)      Start Ordered     Status Ordering Provider    07/05/24 1132 07/05/24 1131  Saline lock IV  Once         Ordered KAREN ARIAS    07/05/24 1132 07/05/24 1131  CBC auto differential  STAT         Ordered KAREN ARIAS    07/05/24 1132 07/05/24 1131  Comprehensive metabolic panel  STAT         Ordered KAREN ARIAS    07/05/24 1132 07/05/24 1131  Urinalysis, Reflex to Urine Culture Urine, Clean Catch  STAT         Ordered KAREN ARIAS              Virtual Visit Note: The provider triage portion  of this emergency department evaluation and documentation was performed via [employer], a HIPAA-compliant telemedicine application, in concert with a tele-presenter in the room. A face to face patient evaluation with one of my colleagues will occur once the patient is placed in an emergency department room.      DISCLAIMER: This note was prepared with [employer] voice recognition transcription software. Garbled syntax, mangled pronouns, and other bizarre constructions may be attributed to that software system.

## 2024-07-06 LAB
BACTERIA GENITAL AEROBE CULT: NORMAL
GRAM STN SPEC: NORMAL

## 2024-07-06 NOTE — ED PROVIDER NOTES
Encounter Date: 2024       History     Chief Complaint   Patient presents with    Flank Pain     LEFT, X FEW DAYS     Patricia James is a 34 year old female wit pmh DM presenting to the ED with c/o left flank pain. She was seen in the ED two days ago and had a negative workup at that time including no acute findings on CT, unremarkable labs and urine. She states pain began approximately 2 weeks ago in the lower abdomen and has been radiating up to the back. She was seen by her PCP today who advised her to come to the ED. She has had no upper respiratory symptoms. Pain is not pleuritic and she has had no fever or urinary symptoms. She has taken no medication for her pain today.       Review of patient's allergies indicates:   Allergen Reactions    Ciprofloxacin Itching and Rash    Keflex [cephalexin]      rash    Latex, natural rubber Hives     Past Medical History:   Diagnosis Date    Diabetes mellitus     Renal disorder     kidney stone     Past Surgical History:   Procedure Laterality Date     SECTION      x 2    VULVECTOMY Left 2022    Procedure: VULVECTOMY, EXCISION OF VULVAR MASS AND OTHER INDICATED PROCEDURES;  Surgeon: Miguel Ángel Arrington MD;  Location: [facility];  Service: OB/GYN;  Laterality: Left;    WRIST ARTHROSCOPY W/ TRIANGULAR FIBROCARTILAGE REPAIR       Family History   Problem Relation Name Age of Onset    Cancer Maternal Grandmother      Heart disease Maternal Grandmother      Leukemia Maternal Grandmother      Hyperlipidemia Father      Hypertension Father      Diabetes Father      Breast cancer Maternal Aunt      Ovarian cancer Maternal Aunt       Social History     Tobacco Use    Smoking status: Never    Smokeless tobacco: Never   Substance Use Topics    Alcohol use: Yes     Comment: occasionally    Drug use: No     Review of Systems   Constitutional:  Negative for fever.   HENT:  Negative for sore throat.    Respiratory:  Negative for shortness of breath.    Cardiovascular:   Negative for chest pain.   Gastrointestinal:  Negative for nausea.   Genitourinary:  Positive for flank pain. Negative for dysuria.   Musculoskeletal:  Negative for back pain.   Skin:  Negative for rash.   Neurological:  Negative for weakness.   Hematological:  Does not bruise/bleed easily.       Physical Exam     Initial Vitals [07/05/24 1050]   BP Pulse Resp Temp SpO2   (!) 164/90 90 18 98.1 °F (36.7 °C) 97 %      MAP       --         Physical Exam    Nursing note and vitals reviewed.  Constitutional: She appears well-developed and well-nourished. She is not diaphoretic. No distress.   HENT:   Head: Normocephalic and atraumatic.   Mouth/Throat: Oropharynx is clear and moist.   Eyes: Conjunctivae are normal.   Neck: Neck supple.   Cardiovascular:  Normal rate, regular rhythm, normal heart sounds and intact distal pulses.     Exam reveals no gallop and no friction rub.       No murmur heard.  Pulmonary/Chest: Breath sounds normal. No respiratory distress. She has no wheezes. She has no rhonchi. She has no rales.   Abdominal: Abdomen is soft. She exhibits no distension. There is no abdominal tenderness.   Musculoskeletal:         General: Normal range of motion.        Arms:       Cervical back: Neck supple.      Comments: Left flank tenderness to palpation. No tenderness to percussion. No overlying skin lesions or ecchymosis     Neurological: She is alert and oriented to person, place, and time.   Skin: No rash noted. No erythema.   Psychiatric: Her speech is normal.         ED Course   Procedures  Labs Reviewed   CBC W/ AUTO DIFFERENTIAL - Abnormal; Notable for the following components:       Result Value    MCV 80 (*)     MCH 26.1 (*)     RDW 14.7 (*)     Immature Granulocytes 0.8 (*)     Immature Grans (Abs) 0.07 (*)     All other components within normal limits   COMPREHENSIVE METABOLIC PANEL - Abnormal; Notable for the following components:    CO2 22 (*)     Glucose 131 (*)     All other components within  normal limits   URINALYSIS, REFLEX TO URINE CULTURE - Abnormal; Notable for the following components:    Appearance, UA Hazy (*)     All other components within normal limits    Narrative:     Specimen Source->Urine          Imaging Results    None          Medications   ketorolac injection 9.999 mg (9.999 mg Intravenous Given 7/5/24 1415)     Medical Decision Making  This is an urgent evaluation of a 34 year old female presenting to the ED with left flank pain. Pain is reproducible to palpation of one specific area. She has no tenderness to percussion. There are no overlying skin lesions or areas of ecchymosis. She had a full workup in the ED two days ago and had no acute findings on CT. Labs were unremarkable two days ago and today with no evidence of UTI, electrolyte derangement, CHAR. No skin lesions concerning for shingles. No ecchymosis concerning for hemorrhage. She was given Toradol IV and lidoderm patch. She did not have a ride home so she was also given a prescription for muscle relaxant at discharge. Symptoms most consistent with musculoskeletal pain. Strict ED return precautions discussed and she verbalized understanding. Based on my clinical evaluation, I do not appreciate any immediate, emergent, or life threatening condition or etiology that warrants additional workup today and feel that the patient can be discharged with close follow up care.       Amount and/or Complexity of Data Reviewed  Labs: ordered.    Risk  Prescription drug management.                                      Clinical Impression:  Final diagnoses:  [M54.6] Acute left-sided thoracic back pain (Primary)          ED Disposition Condition    Discharge           ED Prescriptions       Medication Sig Dispense Start Date End Date Auth. Provider    tiZANidine (ZANAFLEX) 4 MG tablet Take 1 tablet (4 mg total) by mouth every 8 (eight) hours as needed (muscle spasm). 20 tablet 7/5/2024 7/15/2024 Tasha Tadeo NP    ibuprofen  (ADVIL,MOTRIN) 800 MG tablet Take 1 tablet (800 mg total) by mouth every 8 (eight) hours as needed for Pain. 20 tablet 7/5/2024 -- Tasha Tadeo NP    LIDOcaine (LIDODERM) 5 % Place 1 patch onto the skin once daily. Remove & Discard patch within 12 hours. Leave off for 12 hours prior to reapplying. 15 patch 7/5/2024 -- Tasha Tadeo NP          Follow-up Information       Follow up With Specialties Details Why Contact Info Additional Information    [facility] - Emergency Dept Emergency Medicine  As needed, If symptoms worsen [address] [facility] [id number] [phone] 1st floor    Juliette Christianson NP Family Medicine Schedule an appointment as soon as possible for a visit   [facility] [place] [id number]  [phone]                Tasha Tadeo NP  07/06/24 0818

## 2024-07-07 LAB
BACTERIA GENITAL AEROBE CULT: NORMAL
GRAM STN SPEC: NORMAL

## 2024-07-11 ENCOUNTER — OFFICE VISIT (OUTPATIENT)
Dept: OBSTETRICS AND GYNECOLOGY | Facility: CLINIC | Age: 34
End: 2024-07-11
Payer: MEDICAID

## 2024-07-11 VITALS
SYSTOLIC BLOOD PRESSURE: 136 MMHG | WEIGHT: 222.44 LBS | DIASTOLIC BLOOD PRESSURE: 74 MMHG | HEIGHT: 62 IN | BODY MASS INDEX: 40.93 KG/M2

## 2024-07-11 DIAGNOSIS — R10.2 PELVIC PAIN: Primary | ICD-10-CM

## 2024-07-11 DIAGNOSIS — N93.9 ABNORMAL UTERINE BLEEDING (AUB): ICD-10-CM

## 2024-07-11 PROCEDURE — 99213 OFFICE O/P EST LOW 20 MIN: CPT | Mod: PBBFAC,PO | Performed by: OBSTETRICS & GYNECOLOGY

## 2024-07-11 PROCEDURE — 99214 OFFICE O/P EST MOD 30 MIN: CPT | Mod: S$PBB,,, | Performed by: OBSTETRICS & GYNECOLOGY

## 2024-07-11 PROCEDURE — 3044F HG A1C LEVEL LT 7.0%: CPT | Mod: CPTII,,, | Performed by: OBSTETRICS & GYNECOLOGY

## 2024-07-11 PROCEDURE — 99999 PR PBB SHADOW E&M-EST. PATIENT-LVL III: CPT | Mod: PBBFAC,,, | Performed by: OBSTETRICS & GYNECOLOGY

## 2024-07-11 PROCEDURE — 3075F SYST BP GE 130 - 139MM HG: CPT | Mod: CPTII,,, | Performed by: OBSTETRICS & GYNECOLOGY

## 2024-07-11 PROCEDURE — 3008F BODY MASS INDEX DOCD: CPT | Mod: CPTII,,, | Performed by: OBSTETRICS & GYNECOLOGY

## 2024-07-11 PROCEDURE — 1159F MED LIST DOCD IN RCRD: CPT | Mod: CPTII,,, | Performed by: OBSTETRICS & GYNECOLOGY

## 2024-07-11 PROCEDURE — 3078F DIAST BP <80 MM HG: CPT | Mod: CPTII,,, | Performed by: OBSTETRICS & GYNECOLOGY

## 2024-07-11 RX ORDER — MEDROXYPROGESTERONE ACETATE 5 MG/1
5 TABLET ORAL 2 TIMES DAILY
Qty: 20 TABLET | Refills: 0 | Status: SHIPPED | OUTPATIENT
Start: 2024-07-11 | End: 2024-07-21

## 2024-07-11 NOTE — PROGRESS NOTES
Subjective:   Chief Complaint:  Gynecologic Exam (Uterine pain and abnormal bledding)      Date: 2024     Patient ID: Patricia James is a  34 y.o. female.    Contraception: Vasectomy    Patient's last menstrual period was 2024 (exact date).    The patient presents today due to the following:    Prior to one month ago the patient had been doing well.  Over the last month she reports episodes of irregular abnormal uterine bleeding ranging from spotting to heavy bleeding.  This has been associated with pelvic pain.  The bleeding and pain tends to come and go with no GI urologic or other clear association.    GYN & OB History  LMP: Patient's last menstrual period was 2024 (exact date).     Age of Menarche:13  Age at first pregnancy:19   Age at first live birth:19  Number of months breastfeeding:    Age at Menopause:    Comments:     OB History          2    Para   2    Term   2            AB        Living   2         SAB        IAB        Ectopic        Multiple        Live Births               Obstetric Comments    x 2               Allergies:   Review of patient's allergies indicates:   Allergen Reactions    Ciprofloxacin Itching and Rash    Keflex [cephalexin]      rash    Latex, natural rubber Hives       Past Medical History:   Diagnosis Date    Diabetes mellitus     Renal disorder     kidney stone       Past Surgical History:   Procedure Laterality Date     SECTION      x 2    VULVECTOMY Left 2022    Procedure: VULVECTOMY, EXCISION OF VULVAR MASS AND OTHER INDICATED PROCEDURES;  Surgeon: Miguel Ángel Arrington MD;  Location: [facility];  Service: OB/GYN;  Laterality: Left;    WRIST ARTHROSCOPY W/ TRIANGULAR FIBROCARTILAGE REPAIR         Medications    Current Outpatient Medications:     albuterol (PROVENTIL HFA) 90 mcg/actuation inhaler, Inhale 2 puffs into the lungs every 6 (six) hours as needed for Wheezing. Rescue, Disp: 6.7 g, Rfl: 0     azithromycin (Z-CHAVO) 250 MG tablet, Take 2 tablets by mouth on day 1; Take 1 tablet by mouth on days 2-5, Disp: 6 tablet, Rfl: 0    cetirizine (ZYRTEC) 10 MG tablet, Take 1 tablet (10 mg total) by mouth once daily. (Patient not taking: Reported on 2/5/2024), Disp: 30 tablet, Rfl: 0    diclofenac (VOLTAREN) 75 MG EC tablet, Take 1 tablet (75 mg total) by mouth 2 (two) times daily., Disp: 60 tablet, Rfl: 0    ibuprofen (ADVIL,MOTRIN) 800 MG tablet, Take 1 tablet (800 mg total) by mouth every 8 (eight) hours as needed for Pain. (Patient not taking: Reported on 7/11/2024), Disp: 20 tablet, Rfl: 0    LIDOcaine (LIDODERM) 5 %, Place 1 patch onto the skin once daily. Remove & Discard patch within 12 hours. Leave off for 12 hours prior to reapplying. (Patient not taking: Reported on 7/11/2024), Disp: 15 patch, Rfl: 0    meclizine (ANTIVERT) 25 mg tablet, Take 1 tablet (25 mg total) by mouth 3 (three) times daily as needed., Disp: 20 tablet, Rfl: 0    medroxyPROGESTERone (PROVERA) 5 MG tablet, Take 1 tablet (5 mg total) by mouth 2 (two) times a day. for 10 days, Disp: 20 tablet, Rfl: 0    metFORMIN (GLUCOPHAGE-XR) 500 MG ER 24hr tablet, Take 1 tablet (500 mg total) by mouth daily with breakfast., Disp: 90 tablet, Rfl: 3    ondansetron (ZOFRAN) 4 MG tablet, Take 1 tablet (4 mg total) by mouth every 6 (six) hours as needed., Disp: 20 tablet, Rfl: 1    tiZANidine (ZANAFLEX) 4 MG tablet, Take 1 tablet (4 mg total) by mouth every 8 (eight) hours as needed (muscle spasm). (Patient not taking: Reported on 7/11/2024), Disp: 20 tablet, Rfl: 0    Current Facility-Administered Medications:     LIDOcaine-prilocaine cream, , Topical (Top), 1 time in Clinic/HOD, Miguel Ángel Arrington MD     Social History     Tobacco Use    Smoking status: Never    Smokeless tobacco: Never   Substance Use Topics    Alcohol use: Yes     Comment: occasionally    Drug use: No       Family History   Problem Relation Name Age of Onset    Cancer Maternal Grandmother       Heart disease Maternal Grandmother      Leukemia Maternal Grandmother      Hyperlipidemia Father      Hypertension Father      Diabetes Father      Breast cancer Maternal Aunt      Ovarian cancer Maternal Aunt         Review of Systems (at today's evaluation)  Review of Systems   Constitutional:  Negative for fever and unexpected weight change.   Respiratory: Negative.     Cardiovascular:  Negative for chest pain and palpitations.   Gastrointestinal:  Negative for nausea and vomiting.   Genitourinary:  Negative for dysuria, hematuria and urgency.        Gyn as per HPI   Neurological:  Negative for headaches.        Objective:      Vitals:    07/11/24 1036   BP: 136/74     Physical Exam:   Constitutional: She appears well-developed and well-nourished.    HENT:   Head: Normocephalic.     Neck: No thyroid mass present.    Cardiovascular:  Normal rate.             Pulmonary/Chest: Effort normal. No respiratory distress.        Abdominal: Soft. There is no abdominal tenderness.     Genitourinary:    Inguinal canal, vagina, uterus, right adnexa and left adnexa normal.      Pelvic exam was performed with patient supine.   The external female genitalia was normal.   No external genitalia lesions identified,Cervix is normal. Right adnexum displays no mass and no tenderness. Left adnexum displays no mass and no tenderness. No tenderness or bleeding in the vagina. Uterus is not tender. Normal urethral meatus.Urethra findings: no tendernessBladder findings: no bladder tenderness   Genitourinary Comments: A chaperone (female medical assistant) was present throughout the pelvic exam.             Musculoskeletal: Normal range of motion.      Right lower leg: No edema.      Left lower leg: No edema.      Lymphadenopathy:     She has no cervical adenopathy. No inguinal adenopathy noted on the right or left side.    Neurological: She is alert.    Skin: Skin is warm and dry.    Psychiatric: Mood normal.         Assessment:         1. Pelvic pain    2. Abnormal uterine bleeding (AUB)        Plan:      Pelvic pain  -     US Pelvis Comp with Transvag NON-OB (xpd; Future; Expected date: 07/11/2024    Abnormal uterine bleeding (AUB)  -     US Pelvis Comp with Transvag NON-OB (xpd; Future; Expected date: 07/11/2024  -     medroxyPROGESTERone (PROVERA) 5 MG tablet; Take 1 tablet (5 mg total) by mouth 2 (two) times a day. for 10 days  Dispense: 20 tablet; Refill: 0       Follow up for ASAP after recommended testing obtained, Follow-up on today's evaluation.     The above was reviewed discussed with the patient and her family.  We discussed the various potential causes for abnormal uterine bleeding with associated pelvic pain in a 34-year-old.      At this time will proceed as follows:   A pelvic ultrasound has been ordered  The patient will be treated with a short course of oral progesterone.    We will base further evaluation treatment results of the above testing and the patient's response to medical intervention and time.    The patient's questions were answered, and she is in agreement with the current plan.     Miguel Ángel Arrington MD  Department OBGYN [facility]

## 2024-07-17 PROBLEM — M25.531 RIGHT WRIST PAIN: Status: ACTIVE | Noted: 2024-07-17

## 2024-07-19 ENCOUNTER — HOSPITAL ENCOUNTER (OUTPATIENT)
Dept: RADIOLOGY | Facility: HOSPITAL | Age: 34
Discharge: HOME | End: 2024-07-19
Attending: OBSTETRICS & GYNECOLOGY
Payer: MEDICAID

## 2024-07-19 DIAGNOSIS — N93.9 ABNORMAL UTERINE BLEEDING (AUB): ICD-10-CM

## 2024-07-19 DIAGNOSIS — R10.2 PELVIC PAIN: ICD-10-CM

## 2024-07-19 PROCEDURE — 76856 US EXAM PELVIC COMPLETE: CPT | Mod: 26,,, | Performed by: RADIOLOGY

## 2024-07-19 PROCEDURE — 76856 US EXAM PELVIC COMPLETE: CPT | Mod: TC,PO

## 2024-07-19 PROCEDURE — 76830 TRANSVAGINAL US NON-OB: CPT | Mod: 26,,, | Performed by: RADIOLOGY

## 2024-08-05 ENCOUNTER — OFFICE VISIT (OUTPATIENT)
Dept: OBSTETRICS AND GYNECOLOGY | Facility: CLINIC | Age: 34
End: 2024-08-05
Payer: MEDICAID

## 2024-08-05 VITALS
HEIGHT: 62 IN | WEIGHT: 222.69 LBS | BODY MASS INDEX: 40.98 KG/M2 | DIASTOLIC BLOOD PRESSURE: 88 MMHG | SYSTOLIC BLOOD PRESSURE: 138 MMHG

## 2024-08-05 DIAGNOSIS — R10.2 PELVIC PAIN: ICD-10-CM

## 2024-08-05 DIAGNOSIS — N93.9 ABNORMAL UTERINE BLEEDING (AUB): Primary | ICD-10-CM

## 2024-08-05 PROCEDURE — 99213 OFFICE O/P EST LOW 20 MIN: CPT | Mod: PBBFAC,PO | Performed by: OBSTETRICS & GYNECOLOGY

## 2024-08-05 PROCEDURE — 1159F MED LIST DOCD IN RCRD: CPT | Mod: CPTII,,, | Performed by: OBSTETRICS & GYNECOLOGY

## 2024-08-05 PROCEDURE — 3075F SYST BP GE 130 - 139MM HG: CPT | Mod: CPTII,,, | Performed by: OBSTETRICS & GYNECOLOGY

## 2024-08-05 PROCEDURE — 99214 OFFICE O/P EST MOD 30 MIN: CPT | Mod: S$PBB,,, | Performed by: OBSTETRICS & GYNECOLOGY

## 2024-08-05 PROCEDURE — 3008F BODY MASS INDEX DOCD: CPT | Mod: CPTII,,, | Performed by: OBSTETRICS & GYNECOLOGY

## 2024-08-05 PROCEDURE — 3044F HG A1C LEVEL LT 7.0%: CPT | Mod: CPTII,,, | Performed by: OBSTETRICS & GYNECOLOGY

## 2024-08-05 PROCEDURE — 3079F DIAST BP 80-89 MM HG: CPT | Mod: CPTII,,, | Performed by: OBSTETRICS & GYNECOLOGY

## 2024-08-05 PROCEDURE — 99999 PR PBB SHADOW E&M-EST. PATIENT-LVL III: CPT | Mod: PBBFAC,,, | Performed by: OBSTETRICS & GYNECOLOGY

## 2024-08-05 NOTE — PROGRESS NOTES
Subjective:   Chief Complaint:  Follow-up (U/S )      Date: 2024     Patient ID: Patricia James is a  34 y.o. female.    Contraception: Vasectomy    Patient's last menstrual period was 2024 (exact date).    The patient presents today due to the following:    Prior to one month ago the patient had been doing well.  Over the last month she reports episodes of irregular abnormal uterine bleeding ranging from spotting to heavy bleeding.  This has been associated with pelvic pain.  The bleeding and pain tends to come and go with no GI urologic or other clear association.    Date: 2024    The patient presents today for follow-up.  She was last seen as above.  In light of her persistent issues with abnormal uterine bleeding and associated pelvic pain a pelvic ultrasound was ordered.    The patient presents today to discuss the results of the above as well as further evaluation treatment options.      GYN & OB History  LMP: Patient's last menstrual period was 2024 (exact date).     Age of Menarche:13  Age at first pregnancy:19   Age at first live birth:19  Number of months breastfeeding:    Age at Menopause:    Comments:     OB History          2    Para   2    Term   2            AB        Living   2         SAB        IAB        Ectopic        Multiple        Live Births               Obstetric Comments    x 2               Allergies:   Review of patient's allergies indicates:   Allergen Reactions    Ciprofloxacin Itching and Rash    Keflex [cephalexin]      rash    Latex, natural rubber Hives       Past Medical History:   Diagnosis Date    Diabetes mellitus     Renal disorder     kidney stone       Past Surgical History:   Procedure Laterality Date     SECTION      x 2    VULVECTOMY Left 2022    Procedure: VULVECTOMY, EXCISION OF VULVAR MASS AND OTHER INDICATED PROCEDURES;  Surgeon: Miguel Ángel Arrington MD;  Location: [facility];  Service: OB/GYN;   Laterality: Left;    WRIST ARTHROSCOPY W/ TRIANGULAR FIBROCARTILAGE REPAIR         Medications    Current Outpatient Medications:     norgestrel-ethinyl estradioL (LO/OVRAL) 0.3-30 mg-mcg per tablet, Take 1 tablet by mouth once daily., Disp: 90 tablet, Rfl: 3     Social History     Tobacco Use    Smoking status: Never    Smokeless tobacco: Never   Substance Use Topics    Alcohol use: Yes     Comment: occasionally    Drug use: No       Family History   Problem Relation Name Age of Onset    Cancer Maternal Grandmother      Heart disease Maternal Grandmother      Leukemia Maternal Grandmother      Hyperlipidemia Father      Hypertension Father      Diabetes Father      Breast cancer Maternal Aunt      Ovarian cancer Maternal Aunt         Review of Systems (at today's evaluation)  Review of Systems   Constitutional:  Negative for fever and unexpected weight change.   Respiratory: Negative.     Cardiovascular:  Negative for chest pain and palpitations.   Gastrointestinal:  Negative for nausea and vomiting.   Genitourinary:  Negative for dysuria, hematuria and urgency.        Gyn as per HPI   Neurological:  Negative for headaches.        Objective:      Vitals:    08/05/24 1416   BP: 138/88     Physical Exam:   Constitutional: She appears well-developed and well-nourished.    HENT:   Head: Normocephalic.     Neck: No thyroid mass present.    Cardiovascular:  Normal rate.             Pulmonary/Chest: Effort normal. No respiratory distress.        Abdominal: Soft. There is no abdominal tenderness.             Musculoskeletal: Normal range of motion.      Right lower leg: No edema.      Left lower leg: No edema.       Neurological: She is alert.   No gross lesions noted.    Skin: Skin is warm and dry.    Psychiatric: She has a normal mood and affect. Her speech is normal and behavior is normal. Mood normal.         Recent Radiology Results:    7/19/2024      Narrative & Impression  EXAMINATION:  US PELVIS COMP WITH TRANSVAG  NON-OB (XPD)     CLINICAL HISTORY:  Pelvic and perineal pain     TECHNIQUE:  Sonographic evaluation of the pelvis was performed transabdominally and transvaginally including color and spectral Doppler evaluation of the ovaries.     COMPARISON:  04/11/2008     FINDINGS:  The uterus measures 12.9 x 5.9 x 6.9 cm with 5 mm endometrial stripe.  No uterine mass demonstrated.  Both ovaries are normal in size and appearance with normal blood flow present.  There is no adnexal mass.  No significant pelvic free fluid.     Impression:     Unremarkable exam.       Assessment:        1. Abnormal uterine bleeding (AUB)    2. Pelvic pain      Plan:      Abnormal uterine bleeding (AUB)  -     norgestrel-ethinyl estradioL (LO/OVRAL) 0.3-30 mg-mcg per tablet; Take 1 tablet by mouth once daily.  Dispense: 90 tablet; Refill: 3    Pelvic pain  -     norgestrel-ethinyl estradioL (LO/OVRAL) 0.3-30 mg-mcg per tablet; Take 1 tablet by mouth once daily.  Dispense: 90 tablet; Refill: 3         Follow up in about 3 months (around 11/5/2024) for Follow-up on today's evaluation, as needed / for any GYN related issues.     The above was reviewed discussed with the patient and her family.  We once again discussed the various potential causes for abnormal uterine bleeding with associated pelvic pain in a 34-year-old.    Conservative, medical (oral contraceptives, the transdermal patch, the contraceptive ring, progesterone based birth control such as Depo-Provera the Nexplanon and IUDs) and surgical intervention (dilation curettage, endometrial ablation and less likely hysterectomy) were reviewed and discussed.    The patient's questions regarding the above were answered and at this time she would like a trial of a 30 mcg OCP to assist in her bleeding.  She is not a smoker.  The pros, cons, risks, benefits, alternatives and indications of the medication(s) prescribed, as well as appropriate use and potential side effects were discussed.    We will  base further evaluation treatment of the patient's response to medical intervention.      The patient's questions were answered, and she is in agreement with the current plan.     Miguel Ángel Arrington MD  Department OBN  [facility]

## 2024-09-20 ENCOUNTER — OFFICE VISIT (OUTPATIENT)
Dept: URGENT CARE | Facility: CLINIC | Age: 34
End: 2024-09-20
Payer: MEDICAID

## 2024-09-20 VITALS
SYSTOLIC BLOOD PRESSURE: 140 MMHG | TEMPERATURE: 98 F | HEART RATE: 86 BPM | OXYGEN SATURATION: 98 % | WEIGHT: 220 LBS | HEIGHT: 62 IN | BODY MASS INDEX: 40.48 KG/M2 | DIASTOLIC BLOOD PRESSURE: 89 MMHG | RESPIRATION RATE: 19 BRPM

## 2024-09-20 DIAGNOSIS — B37.2 CANDIDAL INTERTRIGO: ICD-10-CM

## 2024-09-20 DIAGNOSIS — T63.461A WASP STING, ACCIDENTAL OR UNINTENTIONAL, INITIAL ENCOUNTER: Primary | ICD-10-CM

## 2024-09-20 PROCEDURE — 99213 OFFICE O/P EST LOW 20 MIN: CPT | Mod: S$GLB,,, | Performed by: NURSE PRACTITIONER

## 2024-09-20 RX ORDER — TRIAMCINOLONE ACETONIDE 0.25 MG/G
CREAM TOPICAL 3 TIMES DAILY PRN
Qty: 80 G | Refills: 0 | Status: SHIPPED | OUTPATIENT
Start: 2024-09-20 | End: 2024-09-27

## 2024-09-20 RX ORDER — CLOTRIMAZOLE AND BETAMETHASONE DIPROPIONATE 10; .64 MG/G; MG/G
CREAM TOPICAL 2 TIMES DAILY
Qty: 15 G | Refills: 0 | Status: SHIPPED | OUTPATIENT
Start: 2024-09-20 | End: 2024-10-04

## 2024-09-20 RX ORDER — BUTALBITAL, ACETAMINOPHEN AND CAFFEINE 50; 325; 40 MG/1; MG/1; MG/1
TABLET ORAL
COMMUNITY
Start: 2024-04-08

## 2024-09-20 RX ORDER — DEXAMETHASONE SODIUM PHOSPHATE 4 MG/ML
8 INJECTION, SOLUTION INTRA-ARTICULAR; INTRALESIONAL; INTRAMUSCULAR; INTRAVENOUS; SOFT TISSUE
Status: COMPLETED | OUTPATIENT
Start: 2024-09-20 | End: 2024-09-20

## 2024-09-20 RX ADMIN — DEXAMETHASONE SODIUM PHOSPHATE 8 MG: 4 INJECTION, SOLUTION INTRA-ARTICULAR; INTRALESIONAL; INTRAMUSCULAR; INTRAVENOUS; SOFT TISSUE at 02:09

## 2024-09-20 NOTE — PROGRESS NOTES
"Subjective:      Patient ID: Patricia James is a 34 y.o. female.    Vitals:  height is 5' 2" (1.575 m) and weight is 99.8 kg (220 lb). Her oral temperature is 97.8 °F (36.6 °C). Her blood pressure is 140/89 (abnormal) and her pulse is 86. Her respiration is 19 and oxygen saturation is 98%.     Chief Complaint: Insect Bite    34-year-old female seen today for itchy and painful lesion to her left upper thigh and itching lesion to her periumbilical area.  She states she went hiking with her children over the weekend and upon returning home she noticed these 2 new lesions.  She denies any fever or noticing when the lesions occurred.    Insect Bite  This is a new problem. The current episode started in the past 7 days. The problem has been gradually worsening. Associated symptoms include a rash. Pertinent negatives include no abdominal pain, chest pain, chills, coughing, fever, headaches, nausea, sore throat or vomiting. Nothing aggravates the symptoms. She has tried nothing for the symptoms.       Constitution: Negative for chills and fever.   HENT:  Negative for sore throat.    Cardiovascular:  Negative for chest pain, palpitations and sob on exertion.   Respiratory:  Negative for cough and shortness of breath.    Gastrointestinal:  Negative for abdominal pain, nausea, vomiting and diarrhea.   Skin:  Positive for rash, skin thickening/induration and erythema.   Neurological:  Negative for dizziness, light-headedness, passing out, headaches, disorientation and altered mental status.   Psychiatric/Behavioral:  Negative for altered mental status, disorientation and confusion.       Objective:     Physical Exam   Constitutional: She is oriented to person, place, and time. She appears well-developed. She is cooperative.  Non-toxic appearance. She does not appear ill. No distress.   HENT:   Head: Normocephalic and atraumatic.   Ears:   Right Ear: Hearing and external ear normal.   Left Ear: Hearing and external ear " normal.   Nose: Nose normal. No mucosal edema, rhinorrhea, nasal deformity or congestion. No epistaxis. Right sinus exhibits no maxillary sinus tenderness and no frontal sinus tenderness. Left sinus exhibits no maxillary sinus tenderness and no frontal sinus tenderness.   Mouth/Throat: Uvula is midline, oropharynx is clear and moist and mucous membranes are normal. Mucous membranes are moist. No trismus in the jaw. Normal dentition. No uvula swelling. No oropharyngeal exudate, posterior oropharyngeal edema or posterior oropharyngeal erythema.   Eyes: Conjunctivae and lids are normal. No scleral icterus.   Neck: Trachea normal and phonation normal. Neck supple. No edema present. No erythema present. No neck rigidity present.   Cardiovascular: Normal rate, regular rhythm, normal heart sounds and normal pulses.   Pulmonary/Chest: Effort normal and breath sounds normal. No stridor. No respiratory distress. She has no decreased breath sounds. She has no rhonchi.   Abdominal: Normal appearance and bowel sounds are normal. She exhibits no mass. Soft.   Musculoskeletal: Normal range of motion.         General: No deformity. Normal range of motion.   Lymphadenopathy:     She has no cervical adenopathy.   Neurological: no focal deficit. She is alert and oriented to person, place, and time. She has normal strength and normal reflexes. No sensory deficit. She exhibits normal muscle tone. Coordination normal.   Skin: Skin is warm, dry, intact, not diaphoretic and not pale. Capillary refill takes 2 to 3 seconds. erythema        Psychiatric: Her speech is normal and behavior is normal. Judgment and thought content normal.   Nursing note and vitals reviewed.      Assessment:     1. Wasp sting, accidental or unintentional, initial encounter    2. Candidal intertrigo        Plan:       Wasp sting, accidental or unintentional, initial encounter  -     dexAMETHasone injection 8 mg  -     triamcinolone acetonide 0.025% (KENALOG) 0.025 %  cream; Apply topically 3 (three) times daily as needed (itching/rash). Apply to left thigh insect sting  Dispense: 80 g; Refill: 0    Candidal intertrigo  -     dexAMETHasone injection 8 mg  -     clotrimazole-betamethasone 1-0.05% (LOTRISONE) cream; Apply topically 2 (two) times daily. Apply to umbilical rash for 14 days  Dispense: 15 g; Refill: 0      1320-patient not in room when provider entered for exam.  Patient not found in lobby or parking lot.  Patient contacted by phone by staff MA and patient stated she had to leave to get to work on time. dori Bae     1400- patient returned and stated she has called into work today. [place]    The test results and physical exam findings were discussed with the patient and all questions answered. We discussed symptom monitoring, conservative care methods, medication use, and follow up orders. he verbalized understanding and agreement with the plan of care.

## 2024-09-20 NOTE — PATIENT INSTRUCTIONS
Increase clear fluid intake  Apply lotrisone to umbilical rash.  May apply triamcinolone cream to thigh as needed for pain/itching.  Apply insect repellant for skin for future outdoor activities  Follow up with your PCP  Go directly to the ER for any development of shortness of breath, chest pain, feelings of throat closure, or other emergent concern.  Return to this clinic for any future concern.

## 2024-10-21 ENCOUNTER — OFFICE VISIT (OUTPATIENT)
Dept: URGENT CARE | Facility: CLINIC | Age: 34
End: 2024-10-21
Payer: MEDICAID

## 2024-10-21 VITALS
HEART RATE: 108 BPM | SYSTOLIC BLOOD PRESSURE: 124 MMHG | WEIGHT: 220 LBS | RESPIRATION RATE: 18 BRPM | HEIGHT: 62 IN | DIASTOLIC BLOOD PRESSURE: 89 MMHG | TEMPERATURE: 98 F | BODY MASS INDEX: 40.48 KG/M2 | OXYGEN SATURATION: 98 %

## 2024-10-21 DIAGNOSIS — R51.9 ACUTE INTRACTABLE HEADACHE, UNSPECIFIED HEADACHE TYPE: Primary | ICD-10-CM

## 2024-10-21 DIAGNOSIS — L92.3 TATTOO REACTION: ICD-10-CM

## 2024-10-21 PROCEDURE — 99213 OFFICE O/P EST LOW 20 MIN: CPT | Mod: S$GLB,,, | Performed by: NURSE PRACTITIONER

## 2024-10-21 RX ORDER — BUTALBITAL, ACETAMINOPHEN AND CAFFEINE 50; 325; 40 MG/1; MG/1; MG/1
1 TABLET ORAL EVERY 4 HOURS PRN
Qty: 30 TABLET | Refills: 0 | Status: SHIPPED | OUTPATIENT
Start: 2024-10-21 | End: 2024-11-20

## 2024-10-21 RX ORDER — MUPIROCIN 20 MG/G
OINTMENT TOPICAL 3 TIMES DAILY
Qty: 30 G | Refills: 1 | Status: SHIPPED | OUTPATIENT
Start: 2024-10-21

## 2024-10-21 NOTE — PATIENT INSTRUCTIONS
Ibuprofen 600-800 mg every 6 hours as needed for headache.    Fioricet as prescribed as needed for headache not relieved by ibuprofen alone.    Increase fluid intake significantly.    Mupirocin ointment 3 times a day to tattoo

## 2024-10-21 NOTE — PROGRESS NOTES
"Subjective:      Patient ID: Patricia James is a 34 y.o. female.    Vitals:  height is 5' 2" (1.575 m) and weight is 99.8 kg (220 lb). Her oral temperature is 98.3 °F (36.8 °C). Her blood pressure is 124/89 and her pulse is 108. Her respiration is 18 and oxygen saturation is 98%.     Chief Complaint: Headache    Patient reports she had a tattoo placed her left forearm last week and since then she has been having a "migraine" that she has not been able to get rid of she has been taking ibuprofen and sinus medicine with no relief from headache.  Denies any other URI or viral like symptoms.  States that she believes that headache is a result of some type of reaction to the tattoo ink used    Headache   This is a new problem. The current episode started in the past 7 days. The problem occurs daily. The problem has been gradually worsening. The pain does not radiate. The pain quality is not similar to prior headaches. The quality of the pain is described as sharp. The pain is at a severity of 6/10. The pain is moderate. Pertinent negatives include no coughing, dizziness, ear pain, fever, nausea, numbness, sore throat, vomiting or weakness. Associated symptoms comments: Allergic reaction to recent tattoo on L arm. . Nothing aggravates the symptoms. She has tried NSAIDs (Sinus medication) for the symptoms. The treatment provided no relief. There is no history of migraine headaches.       Constitution: Negative for activity change, appetite change, chills, fatigue and fever.   HENT:  Negative for ear pain, ear discharge, congestion and sore throat.    Respiratory:  Negative for cough.    Gastrointestinal:  Negative for nausea and vomiting.   Musculoskeletal:  Negative for muscle ache.   Skin:  Negative for erythema and bruising.        Peeling and scaling of new tattoo to left inner forearm   Neurological:  Positive for headaches. Negative for dizziness, light-headedness, history of migraines, disorientation, " altered mental status, loss of consciousness, numbness and tingling.   Psychiatric/Behavioral:  Negative for altered mental status and disorientation.       Objective:     Physical Exam   Constitutional: She is oriented to person, place, and time.  Non-toxic appearance. She does not appear ill. No distress.   HENT:   Head: Normocephalic and atraumatic.   Nose: Nose normal.   Mouth/Throat: Mucous membranes are moist.   Eyes: Conjunctivae are normal. Pupils are equal, round, and reactive to light. Right eye exhibits no discharge. Left eye exhibits no discharge.   Cardiovascular: Normal rate and regular rhythm.   Pulmonary/Chest: Effort normal. No respiratory distress.   Abdominal: Normal appearance.   Musculoskeletal: Normal range of motion.         General: No swelling, tenderness, deformity or signs of injury. Normal range of motion.   Neurological: no focal deficit. She is alert and oriented to person, place, and time.   Skin: Skin is warm, dry and no rash. Capillary refill takes 2 to 3 seconds. No bruising, No erythema and No lesion         Comments: Dry peeling skin over new tattoo, no edema, no erythema, no surrounding induration or indication of cellulitis or infection   Psychiatric: Her behavior is normal. Mood normal.   Nursing note and vitals reviewed.      Assessment:     1. Acute intractable headache, unspecified headache type    2. Tattoo reaction        Plan:       Acute intractable headache, unspecified headache type  -     butalbital-acetaminophen-caffeine -40 mg (FIORICET, ESGIC) -40 mg per tablet; Take 1 tablet by mouth every 4 (four) hours as needed for Pain.  Dispense: 30 tablet; Refill: 0    Tattoo reaction  -     mupirocin (BACTROBAN) 2 % ointment; Apply topically 3 (three) times daily.  Dispense: 30 g; Refill: 1

## 2024-10-25 ENCOUNTER — HOSPITAL ENCOUNTER (EMERGENCY)
Facility: HOSPITAL | Age: 34
Discharge: HOME | End: 2024-10-25
Attending: FAMILY MEDICINE
Payer: MEDICAID

## 2024-10-25 VITALS
BODY MASS INDEX: 39.56 KG/M2 | SYSTOLIC BLOOD PRESSURE: 145 MMHG | HEIGHT: 62 IN | OXYGEN SATURATION: 97 % | DIASTOLIC BLOOD PRESSURE: 89 MMHG | WEIGHT: 215 LBS | TEMPERATURE: 98 F | RESPIRATION RATE: 18 BRPM | HEART RATE: 95 BPM

## 2024-10-25 DIAGNOSIS — L01.00 IMPETIGO: ICD-10-CM

## 2024-10-25 DIAGNOSIS — L92.3 TATTOO REACTION: ICD-10-CM

## 2024-10-25 DIAGNOSIS — T14.8XXA WOUND INFECTION: Primary | ICD-10-CM

## 2024-10-25 DIAGNOSIS — L08.9 WOUND INFECTION: Primary | ICD-10-CM

## 2024-10-25 PROCEDURE — 99284 EMERGENCY DEPT VISIT MOD MDM: CPT

## 2024-10-25 RX ORDER — LORATADINE 10 MG/1
10 TABLET ORAL DAILY
Qty: 30 TABLET | Refills: 11 | Status: SHIPPED | OUTPATIENT
Start: 2024-10-25 | End: 2025-10-25

## 2024-10-25 RX ORDER — MUPIROCIN 20 MG/G
OINTMENT TOPICAL 3 TIMES DAILY
Qty: 15 G | Refills: 0 | Status: SHIPPED | OUTPATIENT
Start: 2024-10-25 | End: 2024-11-01

## 2024-10-25 RX ORDER — SULFAMETHOXAZOLE AND TRIMETHOPRIM 800; 160 MG/1; MG/1
1 TABLET ORAL 2 TIMES DAILY
Qty: 14 TABLET | Refills: 0 | Status: SHIPPED | OUTPATIENT
Start: 2024-10-25 | End: 2024-11-01

## 2024-11-15 ENCOUNTER — OFFICE VISIT (OUTPATIENT)
Dept: URGENT CARE | Facility: CLINIC | Age: 34
End: 2024-11-15
Payer: MEDICAID

## 2024-11-15 VITALS
TEMPERATURE: 97 F | SYSTOLIC BLOOD PRESSURE: 144 MMHG | OXYGEN SATURATION: 98 % | WEIGHT: 226 LBS | HEIGHT: 62 IN | DIASTOLIC BLOOD PRESSURE: 93 MMHG | RESPIRATION RATE: 18 BRPM | BODY MASS INDEX: 41.59 KG/M2 | HEART RATE: 98 BPM

## 2024-11-15 DIAGNOSIS — S46.912A STRAIN OF LEFT SHOULDER, INITIAL ENCOUNTER: Primary | ICD-10-CM

## 2024-11-15 RX ORDER — CYCLOBENZAPRINE HCL 10 MG
10 TABLET ORAL EVERY 8 HOURS PRN
Qty: 30 TABLET | Refills: 0 | Status: SHIPPED | OUTPATIENT
Start: 2024-11-15 | End: 2024-11-25

## 2024-11-15 NOTE — PROGRESS NOTES
"Subjective:       Patient ID: Patricia James is a 34 y.o. female.    Vitals:  vitals were not taken for this visit.     Chief Complaint: Shoulder Pain    35yo female with c/o left shoulder pain. She explains she thinks she "overused" her muscle in her shoulder. She denies any injury or trauma.        Musculoskeletal:  Positive for pain.   Skin:  Negative for erythema.           Objective:      Physical Exam   Constitutional: She is oriented to person, place, and time.  Non-toxic appearance. She does not appear ill. No distress. obesity  HENT:   Head: Normocephalic and atraumatic.   Eyes: Conjunctivae are normal. Extraocular movement intact   Neck: Neck supple.   Cardiovascular: Normal rate, regular rhythm, normal heart sounds and normal pulses.   Pulmonary/Chest: Effort normal and breath sounds normal.   Abdominal: Normal appearance.   Musculoskeletal: Normal range of motion.         General: Tenderness present. No swelling, deformity or signs of injury. Normal range of motion.      Cervical back: She exhibits no tenderness.      Comments: Mild TTP anterior left shoulder.   Neurological: She is alert and oriented to person, place, and time.   Skin: Skin is warm, dry and not diaphoretic. Capillary refill takes less than 2 seconds. No bruising and No erythema   Psychiatric: Her behavior is normal.   Vitals reviewed.        Past medical history and current medications reviewed.       Assessment:           1. Strain of left shoulder, initial encounter              Plan:         Strain of left shoulder, initial encounter  -     cyclobenzaprine (FLEXERIL) 10 MG tablet; Take 1 tablet (10 mg total) by mouth every 8 (eight) hours as needed for Muscle spasms.  Dispense: 30 tablet; Refill: 0           INSTRUCTIONS  Medicatons as prescribed. Follow up with orthopedics as scheduled.          "

## 2024-11-20 ENCOUNTER — HOSPITAL ENCOUNTER (EMERGENCY)
Facility: HOSPITAL | Age: 34
Discharge: HOME | End: 2024-11-21
Attending: EMERGENCY MEDICINE
Payer: MEDICAID

## 2024-11-20 DIAGNOSIS — J40 BRONCHITIS: Primary | ICD-10-CM

## 2024-11-20 DIAGNOSIS — R05.9 COUGH: ICD-10-CM

## 2024-11-20 PROCEDURE — 99283 EMERGENCY DEPT VISIT LOW MDM: CPT | Mod: 25

## 2024-11-20 RX ORDER — AZITHROMYCIN 250 MG/1
TABLET, FILM COATED ORAL
Qty: 6 TABLET | Refills: 0 | Status: SHIPPED | OUTPATIENT
Start: 2024-11-20 | End: 2024-11-25

## 2024-11-21 VITALS
OXYGEN SATURATION: 97 % | HEIGHT: 62 IN | SYSTOLIC BLOOD PRESSURE: 127 MMHG | TEMPERATURE: 98 F | RESPIRATION RATE: 20 BRPM | BODY MASS INDEX: 41.59 KG/M2 | WEIGHT: 226 LBS | DIASTOLIC BLOOD PRESSURE: 85 MMHG | HEART RATE: 99 BPM

## 2024-11-21 NOTE — ED NOTES
Patricia James presents to the ED with c/o SOB with cough and congestion that started 4 days ago. Patient reports being seen by her pcp and given meds but she is not having any relief. She started to have a headache while in the waiting room.   Mucous membranes are pink and moist. Skin is warm, dry and intact.  KARL VSS  Verified patient's name and date of birth.

## 2024-11-21 NOTE — ED PROVIDER NOTES
Encounter Date: 2024       History     Chief Complaint   Patient presents with    Cough     Dx with bronchitis worsening sob starting today      Shortness of Breath     34-year-old presenting with cough, chest congestion, shortness for breath.  These symptoms began 5 days ago.  She was seen in outpatient clinic, says she swab negative for COVID flu and strep, it was discharged with an albuterol inhaler, nasal spray, and Zyrtec.  She says her symptoms are worsening.  She denies any fever.  She denies any history of chronic lung disease.    The history is provided by the patient.     Review of patient's allergies indicates:   Allergen Reactions    Ciprofloxacin Itching and Rash    Keflex [cephalexin]      rash    Latex, natural rubber Hives     Past Medical History:   Diagnosis Date    Diabetes mellitus     Renal disorder     kidney stone     Past Surgical History:   Procedure Laterality Date     SECTION      x 2    VULVECTOMY Left 2022    Procedure: VULVECTOMY, EXCISION OF VULVAR MASS AND OTHER INDICATED PROCEDURES;  Surgeon: Miguel Ángel Arrington MD;  Location: [facility] OR;  Service: OB/GYN;  Laterality: Left;    WRIST ARTHROSCOPY W/ TRIANGULAR FIBROCARTILAGE REPAIR       Family History   Problem Relation Name Age of Onset    Cancer Maternal Grandmother      Heart disease Maternal Grandmother      Leukemia Maternal Grandmother      Hyperlipidemia Father      Hypertension Father      Diabetes Father      Breast cancer Maternal Aunt      Ovarian cancer Maternal Aunt       Social History     Tobacco Use    Smoking status: Never    Smokeless tobacco: Never   Substance Use Topics    Alcohol use: Yes     Comment: occasionally    Drug use: No     Review of Systems   Respiratory:  Positive for cough and shortness of breath.    All other systems reviewed and are negative.      Physical Exam     Initial Vitals [24 2102]   BP Pulse Resp Temp SpO2   (!) 158/91 95 20 97.7 °F (36.5 °C) 99 %      MAP       --          Physical Exam    Nursing note and vitals reviewed.  Constitutional: She appears well-developed and well-nourished. She is not diaphoretic. No distress.   HENT:   Head: Normocephalic.   Eyes: Conjunctivae are normal.   Neck: Neck supple.   Normal range of motion.  Cardiovascular:  Normal rate.           Pulmonary/Chest: Breath sounds normal. No respiratory distress. She has no wheezes.   Abdominal: She exhibits no distension.   Musculoskeletal:         General: No edema.      Cervical back: Normal range of motion and neck supple.     Neurological: She is alert. She has normal strength.   Skin: Skin is warm and dry.   Psychiatric: She has a normal mood and affect.         ED Course   Procedures  Labs Reviewed - No data to display       Imaging Results              X-Ray Chest PA And Lateral (In process)                      Medications - No data to display  Medical Decision Making  34-year-old with 5 days of worsening cough and shortness for breath.  She was normal work of breathing, lung sounds are clear.  Pulse ox is in the mid 90s on room air.  CXR report is still pending, although appears she may have a right basilar infiltrate.  I will start her on azithromycin for possible bacterial bronchitis versus pneumonia.  I discussed return precautions with her.    Risk  Prescription drug management.                                      Clinical Impression:  Final diagnoses:  [R05.9] Cough  [J40] Bronchitis (Primary)          ED Disposition Condition    Discharge Stable          ED Prescriptions       Medication Sig Dispense Start Date End Date Auth. Provider    azithromycin (Z-CHAVO) 250 MG tablet Take 2 tablets by mouth on day 1; Take 1 tablet by mouth on days 2-5 6 tablet 11/20/2024 11/25/2024 Roby Valenzuela MD          Follow-up Information       Follow up With Specialties Details Why Contact Info    Juliette Christianson NP Family Medicine   [address] [place] [id number]  [phone]               Nicolas  Roby WILKINSON MD  11/20/24 5010

## 2024-11-21 NOTE — FIRST PROVIDER EVALUATION
Emergency Department TeleTriage Encounter Note      CHIEF COMPLAINT    Chief Complaint   Patient presents with    Cough     Dx with bronchitis worsening sob starting today      Shortness of Breath       VITAL SIGNS   Initial Vitals [11/20/24 2102]   BP Pulse Resp Temp SpO2   (!) 158/91 95 20 97.7 °F (36.5 °C) 99 %      MAP       --            ALLERGIES    Review of patient's allergies indicates:   Allergen Reactions    Ciprofloxacin Itching and Rash    Keflex [cephalexin]      rash    Latex, natural rubber Hives       PROVIDER TRIAGE NOTE  33 yo F to the ED with cough and sob. Diagnosed with bronchitis, not improving with albuterol or antihistamines.       ORDERS  Labs Reviewed - No data to display    ED Orders (720h ago, onward)      None              Virtual Visit Note: The provider triage portion of this emergency department evaluation and documentation was performed via Pluck, a HIPAA-compliant telemedicine application, in concert with a tele-presenter in the room. A face to face patient evaluation with one of my colleagues will occur once the patient is placed in an emergency department room.      DISCLAIMER: This note was prepared with [employer] voice recognition transcription software. Garbled syntax, mangled pronouns, and other bizarre constructions may be attributed to that software system.

## 2025-01-08 PROBLEM — M25.531 RIGHT WRIST PAIN: Status: RESOLVED | Noted: 2024-07-17 | Resolved: 2025-01-08

## 2025-04-25 ENCOUNTER — HOSPITAL ENCOUNTER (EMERGENCY)
Facility: HOSPITAL | Age: 35
Discharge: HOME | End: 2025-04-26
Attending: STUDENT IN AN ORGANIZED HEALTH CARE EDUCATION/TRAINING PROGRAM
Payer: MEDICAID

## 2025-04-25 ENCOUNTER — PATIENT MESSAGE (OUTPATIENT)
Dept: OBSTETRICS AND GYNECOLOGY | Facility: CLINIC | Age: 35
End: 2025-04-25
Payer: MEDICAID

## 2025-04-25 VITALS
OXYGEN SATURATION: 99 % | RESPIRATION RATE: 16 BRPM | BODY MASS INDEX: 40.48 KG/M2 | WEIGHT: 220 LBS | HEART RATE: 97 BPM | SYSTOLIC BLOOD PRESSURE: 143 MMHG | TEMPERATURE: 98 F | DIASTOLIC BLOOD PRESSURE: 100 MMHG | HEIGHT: 62 IN

## 2025-04-25 DIAGNOSIS — R42 DIZZINESS: ICD-10-CM

## 2025-04-25 DIAGNOSIS — N92.0 MENORRHAGIA WITH REGULAR CYCLE: Primary | ICD-10-CM

## 2025-04-25 DIAGNOSIS — R00.2 PALPITATIONS: ICD-10-CM

## 2025-04-25 LAB
ABSOLUTE EOSINOPHIL (SMH): 0.04 K/UL
ABSOLUTE MONOCYTE (SMH): 0.45 K/UL (ref 0.3–1)
ABSOLUTE NEUTROPHIL COUNT (SMH): 4.5 K/UL (ref 1.8–7.7)
ALBUMIN SERPL-MCNC: 4.8 G/DL (ref 3.5–5.2)
ALP SERPL-CCNC: 77 UNIT/L (ref 55–135)
ALT SERPL-CCNC: 18 UNIT/L (ref 10–44)
ANION GAP (SMH): 9 MMOL/L (ref 8–16)
AST SERPL-CCNC: 10 UNIT/L (ref 10–40)
B-HCG FREE SERPL-ACNC: <0.6 MIU/ML
BASOPHILS # BLD AUTO: 0.04 K/UL
BASOPHILS NFR BLD AUTO: 0.7 %
BILIRUB SERPL-MCNC: 0.6 MG/DL (ref 0.1–1)
BUN SERPL-MCNC: 8 MG/DL (ref 6–20)
CALCIUM SERPL-MCNC: 9.4 MG/DL (ref 8.7–10.5)
CHLORIDE SERPL-SCNC: 104 MMOL/L (ref 95–110)
CO2 SERPL-SCNC: 23 MMOL/L (ref 23–29)
CREAT SERPL-MCNC: 0.6 MG/DL (ref 0.5–1.4)
ERYTHROCYTE [DISTWIDTH] IN BLOOD BY AUTOMATED COUNT: 14.1 % (ref 11.5–14.5)
GFR SERPLBLD CREATININE-BSD FMLA CKD-EPI: >60 ML/MIN/1.73/M2
GLUCOSE SERPL-MCNC: 145 MG/DL (ref 70–110)
HCT VFR BLD AUTO: 37.5 % (ref 37–48.5)
HGB BLD-MCNC: 12.3 GM/DL (ref 12–16)
IMM GRANULOCYTES # BLD AUTO: 0.04 K/UL (ref 0–0.04)
IMM GRANULOCYTES NFR BLD AUTO: 0.7 % (ref 0–0.5)
INDIRECT COOMBS: NORMAL
LYMPHOCYTES # BLD AUTO: 1.06 K/UL (ref 1–4.8)
MCH RBC QN AUTO: 25.2 PG (ref 27–31)
MCHC RBC AUTO-ENTMCNC: 32.8 G/DL (ref 32–36)
MCV RBC AUTO: 77 FL (ref 82–98)
NUCLEATED RBC (/100WBC) (SMH): 0 /100 WBC
PLATELET # BLD AUTO: 359 K/UL (ref 150–450)
PMV BLD AUTO: 9.8 FL (ref 9.2–12.9)
POTASSIUM SERPL-SCNC: 3.5 MMOL/L (ref 3.5–5.1)
PROT SERPL-MCNC: 7.5 GM/DL (ref 6–8.4)
RBC # BLD AUTO: 4.88 M/UL (ref 4–5.4)
RELATIVE EOSINOPHIL (SMH): 0.7 % (ref 0–8)
RELATIVE LYMPHOCYTE (SMH): 17.2 % (ref 18–48)
RELATIVE MONOCYTE (SMH): 7.3 % (ref 4–15)
RELATIVE NEUTROPHIL (SMH): 73.4 % (ref 38–73)
RH BLD: NORMAL
SODIUM SERPL-SCNC: 136 MMOL/L (ref 136–145)
SPECIMEN OUTDATE: NORMAL
TROPONIN HIGH SENSITIVE (SMH): 2.7 PG/ML
TSH SERPL-ACNC: 0.42 UIU/ML (ref 0.34–5.6)
WBC # BLD AUTO: 6.15 K/UL (ref 3.9–12.7)

## 2025-04-25 PROCEDURE — 84702 CHORIONIC GONADOTROPIN TEST: CPT | Performed by: STUDENT IN AN ORGANIZED HEALTH CARE EDUCATION/TRAINING PROGRAM

## 2025-04-25 PROCEDURE — 84443 ASSAY THYROID STIM HORMONE: CPT | Performed by: STUDENT IN AN ORGANIZED HEALTH CARE EDUCATION/TRAINING PROGRAM

## 2025-04-25 PROCEDURE — 63600175 PHARM REV CODE 636 W HCPCS

## 2025-04-25 PROCEDURE — 96360 HYDRATION IV INFUSION INIT: CPT

## 2025-04-25 PROCEDURE — 93005 ELECTROCARDIOGRAM TRACING: CPT | Performed by: INTERNAL MEDICINE

## 2025-04-25 PROCEDURE — 84484 ASSAY OF TROPONIN QUANT: CPT | Performed by: STUDENT IN AN ORGANIZED HEALTH CARE EDUCATION/TRAINING PROGRAM

## 2025-04-25 PROCEDURE — 80053 COMPREHEN METABOLIC PANEL: CPT | Performed by: PHYSICIAN ASSISTANT

## 2025-04-25 PROCEDURE — 85025 COMPLETE CBC W/AUTO DIFF WBC: CPT | Performed by: PHYSICIAN ASSISTANT

## 2025-04-25 PROCEDURE — 36415 COLL VENOUS BLD VENIPUNCTURE: CPT | Performed by: PHYSICIAN ASSISTANT

## 2025-04-25 PROCEDURE — 99284 EMERGENCY DEPT VISIT MOD MDM: CPT | Mod: 25

## 2025-04-25 PROCEDURE — 86900 BLOOD TYPING SEROLOGIC ABO: CPT | Performed by: PHYSICIAN ASSISTANT

## 2025-04-25 PROCEDURE — 93010 ELECTROCARDIOGRAM REPORT: CPT | Mod: ,,, | Performed by: INTERNAL MEDICINE

## 2025-04-25 RX ADMIN — SODIUM CHLORIDE, POTASSIUM CHLORIDE, SODIUM LACTATE AND CALCIUM CHLORIDE 1000 ML: 600; 310; 30; 20 INJECTION, SOLUTION INTRAVENOUS at 09:04

## 2025-04-25 NOTE — FIRST PROVIDER EVALUATION
Emergency Department TeleTriage Encounter Note      CHIEF COMPLAINT    Chief Complaint   Patient presents with    Vaginal Bleeding     Pt states this menstrual cycle is worse than normal. Pt states she is passing large clots and bleeding heavier over the last 2 days.        VITAL SIGNS   Initial Vitals [04/25/25 1643]   BP Pulse Resp Temp SpO2   (!) 155/95 (!) 118 16 98.2 °F (36.8 °C) 98 %      MAP       --            ALLERGIES    Review of patient's allergies indicates:   Allergen Reactions    Ciprofloxacin Itching and Rash    Keflex [cephalexin]      rash    Latex, natural rubber Hives       PROVIDER TRIAGE NOTE  Patient presents with complaint of heavy vaginal bleeding. Reports feeling weak and lightheaded.       ORDERS  Labs Reviewed - No data to display    ED Orders (720h ago, onward)      None              Virtual Visit Note: The provider triage portion of this emergency department evaluation and documentation was performed via [employer], a HIPAA-compliant telemedicine application, in concert with a tele-presenter in the room. A face to face patient evaluation with one of my colleagues will occur once the patient is placed in an emergency department room.      DISCLAIMER: This note was prepared with M*Modal voice recognition transcription software. Garbled syntax, mangled pronouns, and other bizarre constructions may be attributed to that software system.

## 2025-04-26 NOTE — ED PROVIDER NOTES
Encounter Date: 2025       History     Chief Complaint   Patient presents with    Vaginal Bleeding     Pt states this menstrual cycle is worse than normal. Pt states she is passing large clots and bleeding heavier over the last 2 days.      HPI    35-year-old female with no significant past medical history presenting to the ED for evaluation of heavy menses without couple of episodes of palpitations and lightheadedness.  Patient states that she is currently on her 4th day of her menses, reports regular menstrual cycles monthly, denies history of heavy bleeding.  Reports passing several clots over the past couple of days and soaking through several pads, unsure how many.  Denies previous episodes of palpitations or near-syncope.  Denies chest pain, shortness of breath, nausea, vomiting, fevers, prior heart history.  Despite reporting episodes of lightheadedness, denies losing consciousness.      Review of patient's allergies indicates:   Allergen Reactions    Ciprofloxacin Itching and Rash    Keflex [cephalexin]      rash    Latex, natural rubber Hives     Past Medical History:   Diagnosis Date    Diabetes mellitus     Renal disorder     kidney stone     Past Surgical History:   Procedure Laterality Date     SECTION      x 2    VULVECTOMY Left 2022    Procedure: VULVECTOMY, EXCISION OF VULVAR MASS AND OTHER INDICATED PROCEDURES;  Surgeon: Miguel Ángel Arrington MD;  Location: [facility] OR;  Service: OB/GYN;  Laterality: Left;    WRIST ARTHROSCOPY W/ TRIANGULAR FIBROCARTILAGE REPAIR       Family History   Problem Relation Name Age of Onset    Cancer Maternal Grandmother      Heart disease Maternal Grandmother      Leukemia Maternal Grandmother      Hyperlipidemia Father      Hypertension Father      Diabetes Father      Breast cancer Maternal Aunt      Ovarian cancer Maternal Aunt       Social History[1]  Review of Systems  Pertinent ROS and HPI.  Physical Exam     Initial Vitals [25 1643]   BP Pulse Resp  Temp SpO2   (!) 155/95 (!) 118 16 98.2 °F (36.8 °C) 98 %      MAP       --         Physical Exam    Constitutional: She appears well-developed.   HENT:   Head: Normocephalic.   Eyes: Conjunctivae are normal.   Cardiovascular:  Normal rate.           Pulmonary/Chest: Breath sounds normal. No respiratory distress. She has no wheezes.   Abdominal: Abdomen is soft. She exhibits no distension. There is no abdominal tenderness.   Genitourinary:    Genitourinary Comments: Shared decision-making, patient declined.       Neurological: She is alert.   Skin: Skin is warm. Capillary refill takes less than 2 seconds.   Psychiatric: She has a normal mood and affect.         ED Course   Procedures  Labs Reviewed   COMPREHENSIVE METABOLIC PANEL - Abnormal       Result Value    Sodium 136      Potassium 3.5      Chloride 104      CO2 23      Glucose 145 (*)     BUN 8      Creatinine 0.6      Calcium 9.4      Protein Total 7.5      Albumin 4.8      Bilirubin Total 0.6      ALP 77      AST 10      ALT 18      Anion Gap 9      eGFR >60     CBC WITH DIFFERENTIAL - Abnormal    WBC 6.15      RBC 4.88      Hgb 12.3      Hct 37.5      MCV 77 (*)     MCH 25.2 (*)     MCHC 32.8      RDW 14.1      Platelet Count 359      MPV 9.8      Nucleated RBC 0      Neut % 73.4 (*)     Lymph % 17.2 (*)     Mono % 7.3      Eos % 0.7      Basophil % 0.7      Imm Grans % 0.7 (*)     Neut # 4.5      Lymph # 1.06      Mono # 0.45      Eos # 0.04      Baso # 0.04      Imm Grans # 0.04     TSH - Normal    TSH 0.420     TROPONIN I HIGH SENSITIVITY - Normal    Troponin High Sensitive 2.7     CBC W/ AUTO DIFFERENTIAL    Narrative:     The following orders were created for panel order CBC auto differential.  Procedure                               Abnormality         Status                     ---------                               -----------         ------                     CBC with Differential[[id number]]       Abnormal            Final result                  Please view results for these tests on the individual orders.   HCG, QUANTITATIVE    Beta HCG Quant <0.60     EXTRA TUBES    Narrative:     The following orders were created for panel order EXTRA TUBES.  Procedure                               Abnormality         Status                     ---------                               -----------         ------                     Pink Top Hold[[id number]]                                   In process                   Please view results for these tests on the individual orders.   PINK TOP HOLD   POCT URINE PREGNANCY   TYPE & SCREEN    Specimen Outdate 04/28/2025 23:59      Group & Rh A POS      Indirect Pola NEG            Imaging Results    None          Medications   lactated ringers bolus 1,000 mL (0 mLs Intravenous Stopped 4/25/25 7103)     Medical Decision Making  Amount and/or Complexity of Data Reviewed  Labs: ordered.    35-year-old female with no significant past medical history presenting to the ED for evaluation of heavy menses without couple of episodes of palpitations and lightheadedness.  Upon arrival, noted to be tachycardic into the 120s, otherwise normal blood pressure, saturation, afebrile, in no acute distress.  Examined, as above, unrevealing.  Pelvic exam offered but not done per patient's preference, shared decision-making.  Patient did not soak through pads to experience significant bleeding following the emergency department.  Workup revealing hemoglobin at baseline around 12.3, no leukocytosis, normal kidney function, no severe electrolyte derangements, nonelevated TSH, negative serum pregnancy test, nonelevated troponin.  EKG, per my independent interpretation, demonstrating sinus tach at a rate of 110, normal intervals, axis, no ST T changes, or ischemic morphology compared to prior.  Patient was given a liter of fluids and pulse improved to low 90s.  Lower concern for PE given lack of presenting symptoms and otherwise no other other risk  factors. Lower concern for acute endocrine, cardiac, hematologic or metabolic emergency necessitating further workup, treatment, or hospitalization.  Patient was communicated with the results, instructed to follow up with primary care, referred to Cardiology for possible Holter monitor, given return precautions.  Patient understood instructions and was amenable with the plan.  There is from the ED in stable condition.    Anton Simeon MD  [facility], PGY3  04/25/2025   11:30 PM                                      Clinical Impression:  Final diagnoses:  [N92.0] Menorrhagia with regular cycle (Primary)  [R00.2] Palpitations          ED Disposition Condition    Discharge Good          ED Prescriptions    None       Follow-up Information       Follow up With Specialties Details Why Contact Info Additional Information    Juliette Christianson, NP Family Medicine  Please follow up with the primary care doctor regarding today's visit to the emergency department for routine medical care. [address] [facility] [address]  [phone]       [facility]  Please call to schedule an appointment to be seen by Cardiology in clinic regarding your palpitations. [address]  [address]  [facility] [address]  [phone] [address], [phone]               [1]   Social History  Tobacco Use    Smoking status: Never    Smokeless tobacco: Never   Substance Use Topics    Alcohol use: Yes     Comment: occasionally    Drug use: No        Anton Simeon MD  Resident  04/25/25 4738

## 2025-04-30 LAB
OHS QRS DURATION: 78 MS
OHS QTC CALCULATION: 449 MS

## 2025-05-02 ENCOUNTER — OFFICE VISIT (OUTPATIENT)
Dept: OBSTETRICS AND GYNECOLOGY | Facility: CLINIC | Age: 35
End: 2025-05-02
Payer: MEDICAID

## 2025-05-02 VITALS
SYSTOLIC BLOOD PRESSURE: 134 MMHG | BODY MASS INDEX: 40.13 KG/M2 | DIASTOLIC BLOOD PRESSURE: 90 MMHG | HEIGHT: 62 IN | WEIGHT: 218.06 LBS

## 2025-05-02 DIAGNOSIS — N93.9 ABNORMAL UTERINE BLEEDING (AUB): Primary | ICD-10-CM

## 2025-05-02 PROCEDURE — 99999 PR PBB SHADOW E&M-EST. PATIENT-LVL II: CPT | Mod: PBBFAC,,, | Performed by: OBSTETRICS & GYNECOLOGY

## 2025-05-02 PROCEDURE — 99214 OFFICE O/P EST MOD 30 MIN: CPT | Mod: S$PBB,,, | Performed by: OBSTETRICS & GYNECOLOGY

## 2025-05-02 PROCEDURE — 1159F MED LIST DOCD IN RCRD: CPT | Mod: CPTII,,, | Performed by: OBSTETRICS & GYNECOLOGY

## 2025-05-02 PROCEDURE — 99212 OFFICE O/P EST SF 10 MIN: CPT | Mod: PBBFAC,PO | Performed by: OBSTETRICS & GYNECOLOGY

## 2025-05-02 PROCEDURE — 3080F DIAST BP >= 90 MM HG: CPT | Mod: CPTII,,, | Performed by: OBSTETRICS & GYNECOLOGY

## 2025-05-02 PROCEDURE — 3008F BODY MASS INDEX DOCD: CPT | Mod: CPTII,,, | Performed by: OBSTETRICS & GYNECOLOGY

## 2025-05-02 PROCEDURE — 3075F SYST BP GE 130 - 139MM HG: CPT | Mod: CPTII,,, | Performed by: OBSTETRICS & GYNECOLOGY

## 2025-05-02 NOTE — PROGRESS NOTES
[facility] Note    Pertinent Med & GYN Problem List      Subjective:   Chief Complaint:  Follow-up (ER follow up)    Date: 2025     Patient ID: Patricia James is a  35 y.o. female.    Contraception: None    Patient's last menstrual period was 2025 (approximate).    The patient presents today due to the following:    On 2025 the patient was seen evaluated in the emergency room due to a seven day history of heavy menstrual bleeding.    She reports that her cycle has been fairly regular but worsening over time.    No significant pelvic pain.    She presented primarily due to her bleeding with the associated palpitations and lightheadedness.    GYN & OB History  LMP: Patient's last menstrual period was 2025 (approximate).     Age of Menarche:13  Age at first pregnancy:19   Age at first live birth:19  Number of months breastfeeding:    Age at Menopause:    Comments:     OB History          2    Para   2    Term   2            AB        Living   2         SAB        IAB        Ectopic        Multiple        Live Births               Obstetric Comments    x 2               Allergies:   Review of patient's allergies indicates:   Allergen Reactions    Ciprofloxacin Itching and Rash    Keflex [cephalexin]      rash    Latex, natural rubber Hives       Past Medical History:   Diagnosis Date    Diabetes mellitus     Renal disorder     kidney stone       Past Surgical History:   Procedure Laterality Date     SECTION      x 2    VULVECTOMY Left 2022    Procedure: VULVECTOMY, EXCISION OF VULVAR MASS AND OTHER INDICATED PROCEDURES;  Surgeon: Miguel Ángel Arrington MD;  Location: [facility];  Service: OB/GYN;  Laterality: Left;    WRIST ARTHROSCOPY W/ TRIANGULAR FIBROCARTILAGE REPAIR         Medications  Current Medications[1]     Social History[2]    Family History   Problem Relation Name Age of Onset    Cancer Maternal Grandmother       Heart disease Maternal Grandmother      Leukemia Maternal Grandmother      Hyperlipidemia Father      Hypertension Father      Diabetes Father      Breast cancer Maternal Aunt      Ovarian cancer Maternal Aunt         Review of Systems (at today's evaluation)  Review of Systems   Constitutional:  Negative for fever and unexpected weight change.   Respiratory:  Negative for cough and shortness of breath.    Cardiovascular:  Negative for chest pain and palpitations.   Gastrointestinal:  Negative for constipation, diarrhea, nausea and vomiting.   Genitourinary:  Negative for dysuria, frequency, hematuria and urgency.        Gyn as per HPI   Neurological:  Negative for headaches.        Objective:      Vitals:    05/02/25 1125   BP: (!) 134/90     Physical Exam:   Constitutional: She appears well-developed and well-nourished. No distress.    HENT:   Head: Normocephalic.     Neck: No thyroid mass present.    Cardiovascular:  Normal rate.             Pulmonary/Chest: Effort normal. No respiratory distress.        Abdominal: Soft. There is no abdominal tenderness.     Genitourinary:    Inguinal canal, vagina, uterus, right adnexa and left adnexa normal.      Pelvic exam was performed with patient supine.   The external female genitalia was normal.   No external genitalia lesions identified,Cervix is normal. Right adnexum displays no mass and no tenderness. Left adnexum displays no mass and no tenderness. No tenderness or bleeding in the vagina. Uterus is not tender. Normal urethral meatus.Urethra findings: no tendernessBladder findings: no bladder tenderness   Genitourinary Comments: A chaperone (female medical assistant) was present throughout the pelvic exam.             Musculoskeletal: Normal range of motion.      Right lower leg: No edema.      Left lower leg: No edema.      Lymphadenopathy: No inguinal adenopathy noted on the right or left side.    Neurological: She is alert.    Skin: Skin is warm and dry.     Psychiatric: She has a normal mood and affect. Mood normal.         Recent Laboratory Results:    04/25/2025   Pregnancy test negative   TSH:  0.42  CBC on 4/25/2025 noted: 6.15 > 12.3 / 37.5 < 359    Recent Radiology Results:     7/19/2024 Routine     Narrative & Impression  EXAMINATION:  US PELVIS COMP WITH TRANSVAG NON-OB (XPD)     CLINICAL HISTORY:  Pelvic and perineal pain    FINDINGS:  The uterus measures 12.9 x 5.9 x 6.9 cm with 5 mm endometrial stripe.  No uterine mass demonstrated.  Both ovaries are normal in size and appearance with normal blood flow present.  There is no adnexal mass.  No significant pelvic free fluid.     Impression:     Unremarkable exam.       Assessment:        1. Abnormal uterine bleeding (AUB)        Plan:      Abnormal uterine bleeding (AUB)  -     US Pelvis Comp with Transvag NON-OB (xpd; Future; Expected date: 05/02/2025  -     Luteinizing Hormone; Future; Expected date: 05/02/2025  -     Follicle Stimulating Hormone; Future; Expected date: 05/02/2025  -     Estradiol; Future; Expected date: 05/02/2025  -     Ferritin; Future; Expected date: 05/02/2025  -     CBC Auto Differential; Future; Expected date: 05/02/2025       Follow up for ASAP after recommended testing obtained, F/U on today's evaluation.     The above was reviewed discussed with the patient.  We discussed her recent episode of abnormal uterine bleeding and at this time will proceed as follows:   Lab work as above and pelvic ultrasound has been ordered.    We will base further evaluation treatment results of the above testing and the patient's status over time.    The patient's questions were answered, and she is in agreement with the current plan.     Miguel Ángel Arrington MD  Department OBGYN [facility]         [1] No current outpatient medications on file.  [2]   Social History  Tobacco Use    Smoking status: Never    Smokeless tobacco: Never   Substance Use Topics    Alcohol use: Yes     Comment: occasionally     Drug use: No

## 2025-05-05 ENCOUNTER — RESULTS FOLLOW-UP (OUTPATIENT)
Dept: OBSTETRICS AND GYNECOLOGY | Facility: CLINIC | Age: 35
End: 2025-05-05

## 2025-05-05 ENCOUNTER — HOSPITAL ENCOUNTER (OUTPATIENT)
Dept: RADIOLOGY | Facility: HOSPITAL | Age: 35
Discharge: HOME | End: 2025-05-05
Attending: OBSTETRICS & GYNECOLOGY
Payer: MEDICAID

## 2025-05-05 ENCOUNTER — LAB VISIT (OUTPATIENT)
Dept: LAB | Facility: HOSPITAL | Age: 35
End: 2025-05-05
Attending: OBSTETRICS & GYNECOLOGY
Payer: MEDICAID

## 2025-05-05 DIAGNOSIS — N93.9 ABNORMAL UTERINE BLEEDING (AUB): ICD-10-CM

## 2025-05-05 LAB
ABSOLUTE EOSINOPHIL (SMH): 0.11 K/UL
ABSOLUTE MONOCYTE (SMH): 0.44 K/UL (ref 0.3–1)
ABSOLUTE NEUTROPHIL COUNT (SMH): 4.5 K/UL (ref 1.8–7.7)
BASOPHILS # BLD AUTO: 0.05 K/UL
BASOPHILS NFR BLD AUTO: 0.7 %
ERYTHROCYTE [DISTWIDTH] IN BLOOD BY AUTOMATED COUNT: 13.7 % (ref 11.5–14.5)
FERRITIN SERPL-MCNC: 9.6 NG/ML (ref 20–300)
HCT VFR BLD AUTO: 37.3 % (ref 37–48.5)
HGB BLD-MCNC: 12.1 GM/DL (ref 12–16)
IMM GRANULOCYTES # BLD AUTO: 0.04 K/UL (ref 0–0.04)
IMM GRANULOCYTES NFR BLD AUTO: 0.6 % (ref 0–0.5)
LYMPHOCYTES # BLD AUTO: 1.99 K/UL (ref 1–4.8)
MCH RBC QN AUTO: 25 PG (ref 27–31)
MCHC RBC AUTO-ENTMCNC: 32.4 G/DL (ref 32–36)
MCV RBC AUTO: 77 FL (ref 82–98)
NUCLEATED RBC (/100WBC) (SMH): 0 /100 WBC
PLATELET # BLD AUTO: 392 K/UL (ref 150–450)
PMV BLD AUTO: 9.1 FL (ref 9.2–12.9)
RBC # BLD AUTO: 4.84 M/UL (ref 4–5.4)
RELATIVE EOSINOPHIL (SMH): 1.5 % (ref 0–8)
RELATIVE LYMPHOCYTE (SMH): 28 % (ref 18–48)
RELATIVE MONOCYTE (SMH): 6.2 % (ref 4–15)
RELATIVE NEUTROPHIL (SMH): 63 % (ref 38–73)
WBC # BLD AUTO: 7.11 K/UL (ref 3.9–12.7)

## 2025-05-05 PROCEDURE — 83002 ASSAY OF GONADOTROPIN (LH): CPT

## 2025-05-05 PROCEDURE — 36415 COLL VENOUS BLD VENIPUNCTURE: CPT

## 2025-05-05 PROCEDURE — 76856 US EXAM PELVIC COMPLETE: CPT | Mod: 26,,, | Performed by: RADIOLOGY

## 2025-05-05 PROCEDURE — 76830 TRANSVAGINAL US NON-OB: CPT | Mod: 26,,, | Performed by: RADIOLOGY

## 2025-05-05 PROCEDURE — 85025 COMPLETE CBC W/AUTO DIFF WBC: CPT

## 2025-05-05 PROCEDURE — 76856 US EXAM PELVIC COMPLETE: CPT | Mod: TC,PO

## 2025-05-05 PROCEDURE — 82670 ASSAY OF TOTAL ESTRADIOL: CPT

## 2025-05-05 PROCEDURE — 83001 ASSAY OF GONADOTROPIN (FSH): CPT

## 2025-05-05 PROCEDURE — 82728 ASSAY OF FERRITIN: CPT

## 2025-05-06 LAB
ESTRADIOL SERPL HS-MCNC: 42 PG/ML
FSH SERPL-ACNC: 4.23 MIU/ML
LH SERPL-ACNC: 3.3 MIU/ML

## 2025-05-12 ENCOUNTER — OFFICE VISIT (OUTPATIENT)
Dept: OBSTETRICS AND GYNECOLOGY | Facility: CLINIC | Age: 35
End: 2025-05-12
Payer: MEDICAID

## 2025-05-12 VITALS — WEIGHT: 216.5 LBS | SYSTOLIC BLOOD PRESSURE: 150 MMHG | BODY MASS INDEX: 39.6 KG/M2 | DIASTOLIC BLOOD PRESSURE: 106 MMHG

## 2025-05-12 DIAGNOSIS — N93.9 ABNORMAL UTERINE BLEEDING (AUB): Primary | ICD-10-CM

## 2025-05-12 DIAGNOSIS — R10.2 PELVIC PAIN: ICD-10-CM

## 2025-05-12 PROCEDURE — 99212 OFFICE O/P EST SF 10 MIN: CPT | Mod: PBBFAC,PO | Performed by: OBSTETRICS & GYNECOLOGY

## 2025-05-12 PROCEDURE — 3077F SYST BP >= 140 MM HG: CPT | Mod: CPTII,,, | Performed by: OBSTETRICS & GYNECOLOGY

## 2025-05-12 PROCEDURE — 3080F DIAST BP >= 90 MM HG: CPT | Mod: CPTII,,, | Performed by: OBSTETRICS & GYNECOLOGY

## 2025-05-12 PROCEDURE — 99213 OFFICE O/P EST LOW 20 MIN: CPT | Mod: S$PBB,,, | Performed by: OBSTETRICS & GYNECOLOGY

## 2025-05-12 PROCEDURE — 99999 PR PBB SHADOW E&M-EST. PATIENT-LVL II: CPT | Mod: PBBFAC,,, | Performed by: OBSTETRICS & GYNECOLOGY

## 2025-05-12 PROCEDURE — 3008F BODY MASS INDEX DOCD: CPT | Mod: CPTII,,, | Performed by: OBSTETRICS & GYNECOLOGY

## 2025-05-20 ENCOUNTER — PATIENT OUTREACH (OUTPATIENT)
Facility: OTHER | Age: 35
End: 2025-05-20
Payer: MEDICAID

## 2025-05-20 NOTE — PROGRESS NOTES
Maryellen Arguello  ED Navigator  Emergency Department    Project: [facility] ED Navigator  Role: Community Health Worker    Date: 05/20/2025  Patient Name: Patricia James  MRN: [id number]  PCP: Juliette Christianson NP    Assessment:     Patricia James is a 35 y.o. female who has presented to ED for vaginal bleeding. Patient has visited the ED 1 times in the past 3 months. Patient did not contact PCP.     ED Navigator Initial Assessment    ED Navigator Enrollment Documentation  Consent to Services  Does patient consent to completing the assessment?: Yes  Contact  Method of Initial Contact: Phone  Transportation  Insurance Coverage  Do you have coverage/adequate coverage?: Yes  Specialist Appointment  Did the patient come to the ED to see a specialist?: No  Does the patient have a pending specialist referral?: No  Does the patient have a specialist appointment made?: No  PCP Follow Up Appointment  Medications  Is patient able to afford medication?: Yes  Psychological  Food  Communication/Education  Other Financial Concerns  Other Social Barriers/Concerns  Primary Barrier  Plan: Provided information for [facility] 24/7 Nurse triage line, [phone] or [phone] ([phone])         Social History     Socioeconomic History    Marital status:    Tobacco Use    Smoking status: Never    Smokeless tobacco: Never   Substance and Sexual Activity    Alcohol use: Yes     Comment: occasionally    Drug use: No    Sexual activity: Not Currently     Partners: Male     Birth control/protection: Partner-Vasectomy     Social Drivers of Health     Financial Resource Strain: Low Risk  (5/20/2025)    Overall Financial Resource Strain (CARDIA)     Difficulty of Paying Living Expenses: Not very hard   Food Insecurity: No Food Insecurity (5/20/2025)    Hunger Vital Sign     Worried About Running Out of Food in the Last Year: Never true     Ran Out of Food in the Last Year: Never true   Transportation Needs: No Transportation  Needs (5/20/2025)    PRAPARE - Transportation     Lack of Transportation (Medical): No     Lack of Transportation (Non-Medical): No   Stress: No Stress Concern Present (5/20/2025)    [employer] [place] [employer] - Occupational Stress Questionnaire     Feeling of Stress : Not at all   Housing Stability: Low Risk  (5/20/2025)    Housing Stability Vital Sign     Unable to Pay for Housing in the Last Year: No     Homeless in the Last Year: No       Plan:   Spoke with patient on the telephone for high utilizer ED Navigation.  Patient was seen in the ED on 04/25/25 for vaginal bleeding.  Patient reported she is doing well following her ED visit and has followed up with her Ob-Gyn.  Patient denied needing scheduling assistance with other providers.  No additional needs or concerns were reported. An email was sent to patient at [email or web address] with information on [facility], [facility] Nurse Triage Line, and Why Do I Need a PCP.  Patient was encouraged to contact me for help with scheduling or community resources, if needed.    Maryellen Arguello  ED Navigator

## 2025-05-29 ENCOUNTER — HOSPITAL ENCOUNTER (EMERGENCY)
Facility: HOSPITAL | Age: 35
Discharge: HOME | End: 2025-05-29
Attending: EMERGENCY MEDICINE
Payer: MEDICAID

## 2025-05-29 VITALS
DIASTOLIC BLOOD PRESSURE: 68 MMHG | HEART RATE: 82 BPM | RESPIRATION RATE: 16 BRPM | TEMPERATURE: 98 F | OXYGEN SATURATION: 98 % | SYSTOLIC BLOOD PRESSURE: 134 MMHG | HEIGHT: 62 IN | BODY MASS INDEX: 39.56 KG/M2 | WEIGHT: 215 LBS

## 2025-05-29 DIAGNOSIS — R63.0 DECREASED APPETITE: ICD-10-CM

## 2025-05-29 DIAGNOSIS — R11.0 NAUSEA: Primary | ICD-10-CM

## 2025-05-29 LAB
ABSOLUTE EOSINOPHIL (SMH): 0.13 K/UL
ABSOLUTE MONOCYTE (SMH): 0.48 K/UL (ref 0.3–1)
ABSOLUTE NEUTROPHIL COUNT (SMH): 4.1 K/UL (ref 1.8–7.7)
ALBUMIN SERPL-MCNC: 4.6 G/DL (ref 3.5–5.2)
ALP SERPL-CCNC: 78 UNIT/L (ref 40–150)
ALT SERPL-CCNC: 28 UNIT/L (ref 10–44)
ANION GAP (SMH): 11 MMOL/L (ref 8–16)
AST SERPL-CCNC: 24 UNIT/L (ref 11–45)
B-HCG UR QL: NEGATIVE
BACTERIA #/AREA URNS AUTO: ABNORMAL /HPF
BASOPHILS # BLD AUTO: 0.05 K/UL
BASOPHILS NFR BLD AUTO: 0.7 %
BILIRUB SERPL-MCNC: 0.4 MG/DL (ref 0.1–1)
BILIRUB UR QL STRIP.AUTO: NEGATIVE
BUN SERPL-MCNC: 6 MG/DL (ref 6–20)
CALCIUM SERPL-MCNC: 9.4 MG/DL (ref 8.7–10.5)
CHLORIDE SERPL-SCNC: 107 MMOL/L (ref 95–110)
CLARITY UR: ABNORMAL
CO2 SERPL-SCNC: 21 MMOL/L (ref 23–29)
COLOR UR AUTO: YELLOW
CREAT SERPL-MCNC: 0.7 MG/DL (ref 0.5–1.4)
CTP QC/QA: YES
ERYTHROCYTE [DISTWIDTH] IN BLOOD BY AUTOMATED COUNT: 14.2 % (ref 11.5–14.5)
GFR SERPLBLD CREATININE-BSD FMLA CKD-EPI: >60 ML/MIN/1.73/M2
GLUCOSE SERPL-MCNC: 126 MG/DL (ref 70–110)
GLUCOSE UR QL STRIP: NEGATIVE
HCT VFR BLD AUTO: 36.3 % (ref 37–48.5)
HGB BLD-MCNC: 11.6 GM/DL (ref 12–16)
HGB UR QL STRIP: ABNORMAL
IMM GRANULOCYTES # BLD AUTO: 0.02 K/UL (ref 0–0.04)
IMM GRANULOCYTES NFR BLD AUTO: 0.3 % (ref 0–0.5)
KETONES UR QL STRIP: NEGATIVE
LEUKOCYTE ESTERASE UR QL STRIP: NEGATIVE
LIPASE SERPL-CCNC: 46 U/L (ref 4–60)
LYMPHOCYTES # BLD AUTO: 1.92 K/UL (ref 1–4.8)
MCH RBC QN AUTO: 24.8 PG (ref 27–31)
MCHC RBC AUTO-ENTMCNC: 32 G/DL (ref 32–36)
MCV RBC AUTO: 78 FL (ref 82–98)
MICROSCOPIC COMMENT: ABNORMAL
NITRITE UR QL STRIP: NEGATIVE
NUCLEATED RBC (/100WBC) (SMH): 0 /100 WBC
PH UR STRIP: 7 [PH]
PLATELET # BLD AUTO: 390 K/UL (ref 150–450)
PMV BLD AUTO: 9.7 FL (ref 9.2–12.9)
POTASSIUM SERPL-SCNC: 3.6 MMOL/L (ref 3.5–5.1)
PROT SERPL-MCNC: 7.1 GM/DL (ref 6–8.4)
PROT UR QL STRIP: NEGATIVE
RBC # BLD AUTO: 4.67 M/UL (ref 4–5.4)
RBC #/AREA URNS AUTO: 1 /HPF
RELATIVE EOSINOPHIL (SMH): 1.9 % (ref 0–8)
RELATIVE LYMPHOCYTE (SMH): 28.6 % (ref 18–48)
RELATIVE MONOCYTE (SMH): 7.1 % (ref 4–15)
RELATIVE NEUTROPHIL (SMH): 61.4 % (ref 38–73)
SODIUM SERPL-SCNC: 139 MMOL/L (ref 136–145)
SP GR UR STRIP: 1.01
SQUAMOUS #/AREA URNS AUTO: 6 /HPF
UROBILINOGEN UR STRIP-ACNC: NEGATIVE EU/DL
WBC # BLD AUTO: 6.72 K/UL (ref 3.9–12.7)
WBC #/AREA URNS AUTO: 6 /HPF

## 2025-05-29 PROCEDURE — 85025 COMPLETE CBC W/AUTO DIFF WBC: CPT | Performed by: PHYSICIAN ASSISTANT

## 2025-05-29 PROCEDURE — 96375 TX/PRO/DX INJ NEW DRUG ADDON: CPT

## 2025-05-29 PROCEDURE — 96361 HYDRATE IV INFUSION ADD-ON: CPT

## 2025-05-29 PROCEDURE — 25000003 PHARM REV CODE 250: Performed by: PHYSICIAN ASSISTANT

## 2025-05-29 PROCEDURE — 63600175 PHARM REV CODE 636 W HCPCS: Performed by: PHYSICIAN ASSISTANT

## 2025-05-29 PROCEDURE — 81003 URINALYSIS AUTO W/O SCOPE: CPT | Performed by: PHYSICIAN ASSISTANT

## 2025-05-29 PROCEDURE — 99285 EMERGENCY DEPT VISIT HI MDM: CPT | Mod: 25

## 2025-05-29 PROCEDURE — 82247 BILIRUBIN TOTAL: CPT | Performed by: PHYSICIAN ASSISTANT

## 2025-05-29 PROCEDURE — 96365 THER/PROPH/DIAG IV INF INIT: CPT

## 2025-05-29 PROCEDURE — 83690 ASSAY OF LIPASE: CPT | Performed by: PHYSICIAN ASSISTANT

## 2025-05-29 PROCEDURE — 36415 COLL VENOUS BLD VENIPUNCTURE: CPT | Performed by: PHYSICIAN ASSISTANT

## 2025-05-29 PROCEDURE — 25500020 PHARM REV CODE 255

## 2025-05-29 PROCEDURE — 81025 URINE PREGNANCY TEST: CPT | Performed by: PHYSICIAN ASSISTANT

## 2025-05-29 RX ORDER — ALUMINUM HYDROXIDE, MAGNESIUM HYDROXIDE, AND SIMETHICONE 1200; 120; 1200 MG/30ML; MG/30ML; MG/30ML
30 SUSPENSION ORAL ONCE
Status: COMPLETED | OUTPATIENT
Start: 2025-05-29 | End: 2025-05-29

## 2025-05-29 RX ORDER — ONDANSETRON 4 MG/1
4 TABLET, ORALLY DISINTEGRATING ORAL EVERY 6 HOURS PRN
Qty: 20 TABLET | Refills: 0 | Status: ON HOLD | OUTPATIENT
Start: 2025-05-29

## 2025-05-29 RX ORDER — PANTOPRAZOLE SODIUM 40 MG/10ML
40 INJECTION, POWDER, LYOPHILIZED, FOR SOLUTION INTRAVENOUS
Status: COMPLETED | OUTPATIENT
Start: 2025-05-29 | End: 2025-05-29

## 2025-05-29 RX ORDER — ONDANSETRON HYDROCHLORIDE 2 MG/ML
4 INJECTION, SOLUTION INTRAVENOUS
Status: COMPLETED | OUTPATIENT
Start: 2025-05-29 | End: 2025-05-29

## 2025-05-29 RX ORDER — FERROUS SULFATE 325(65) MG
TABLET ORAL
Status: ON HOLD | COMMUNITY
Start: 2025-05-06

## 2025-05-29 RX ORDER — FAMOTIDINE 10 MG/ML
20 INJECTION, SOLUTION INTRAVENOUS
Status: COMPLETED | OUTPATIENT
Start: 2025-05-29 | End: 2025-05-29

## 2025-05-29 RX ORDER — LIDOCAINE HYDROCHLORIDE 20 MG/ML
15 SOLUTION OROPHARYNGEAL ONCE
Status: COMPLETED | OUTPATIENT
Start: 2025-05-29 | End: 2025-05-29

## 2025-05-29 RX ORDER — FAMOTIDINE 20 MG/1
20 TABLET, FILM COATED ORAL 2 TIMES DAILY
Qty: 20 TABLET | Refills: 0 | Status: ON HOLD | OUTPATIENT
Start: 2025-05-29 | End: 2026-05-29

## 2025-05-29 RX ORDER — PROMETHAZINE HYDROCHLORIDE 25 MG/1
25 SUPPOSITORY RECTAL EVERY 6 HOURS PRN
Qty: 12 SUPPOSITORY | Refills: 0 | Status: ON HOLD | OUTPATIENT
Start: 2025-05-29

## 2025-05-29 RX ADMIN — SODIUM CHLORIDE 1000 ML: 9 INJECTION, SOLUTION INTRAVENOUS at 06:05

## 2025-05-29 RX ADMIN — PANTOPRAZOLE SODIUM 40 MG: 40 INJECTION, POWDER, FOR SOLUTION INTRAVENOUS at 08:05

## 2025-05-29 RX ADMIN — ALUMINUM HYDROXIDE, MAGNESIUM HYDROXIDE, AND SIMETHICONE 30 ML: 1200; 120; 1200 SUSPENSION ORAL at 08:05

## 2025-05-29 RX ADMIN — LIDOCAINE HYDROCHLORIDE 15 ML: 20 SOLUTION ORAL at 08:05

## 2025-05-29 RX ADMIN — ONDANSETRON 4 MG: 2 INJECTION INTRAMUSCULAR; INTRAVENOUS at 06:05

## 2025-05-29 RX ADMIN — FAMOTIDINE 20 MG: 10 INJECTION, SOLUTION INTRAVENOUS at 06:05

## 2025-05-29 RX ADMIN — IOHEXOL 100 ML: 350 INJECTION, SOLUTION INTRAVENOUS at 07:05

## 2025-05-29 RX ADMIN — PROMETHAZINE HYDROCHLORIDE 25 MG: 25 INJECTION INTRAMUSCULAR; INTRAVENOUS at 08:05

## 2025-05-30 ENCOUNTER — PATIENT MESSAGE (OUTPATIENT)
Dept: FAMILY MEDICINE | Facility: CLINIC | Age: 35
End: 2025-05-30
Payer: MEDICAID

## 2025-05-30 ENCOUNTER — HOSPITAL ENCOUNTER (EMERGENCY)
Facility: HOSPITAL | Age: 35
Discharge: HOME | End: 2025-05-31
Attending: EMERGENCY MEDICINE
Payer: MEDICAID

## 2025-05-30 DIAGNOSIS — G43.009 ATYPICAL MIGRAINE: Primary | ICD-10-CM

## 2025-05-30 PROCEDURE — 99284 EMERGENCY DEPT VISIT MOD MDM: CPT | Mod: 25

## 2025-05-31 VITALS
DIASTOLIC BLOOD PRESSURE: 93 MMHG | OXYGEN SATURATION: 100 % | BODY MASS INDEX: 39.56 KG/M2 | WEIGHT: 215 LBS | HEIGHT: 62 IN | RESPIRATION RATE: 20 BRPM | SYSTOLIC BLOOD PRESSURE: 137 MMHG | HEART RATE: 80 BPM | TEMPERATURE: 98 F

## 2025-05-31 LAB
ABSOLUTE EOSINOPHIL (SMH): 0.12 K/UL
ABSOLUTE MONOCYTE (SMH): 0.44 K/UL (ref 0.3–1)
ABSOLUTE NEUTROPHIL COUNT (SMH): 3.8 K/UL (ref 1.8–7.7)
ALBUMIN SERPL-MCNC: 4 G/DL (ref 3.5–5.2)
ALP SERPL-CCNC: 61 UNIT/L (ref 55–135)
ALT SERPL-CCNC: 22 UNIT/L (ref 10–44)
ANION GAP (SMH): 10 MMOL/L (ref 8–16)
AST SERPL-CCNC: 14 UNIT/L (ref 10–40)
BASOPHILS # BLD AUTO: 0.04 K/UL
BASOPHILS NFR BLD AUTO: 0.6 %
BILIRUB SERPL-MCNC: 0.4 MG/DL (ref 0.1–1)
BUN SERPL-MCNC: 10 MG/DL (ref 6–20)
CALCIUM SERPL-MCNC: 8.7 MG/DL (ref 8.7–10.5)
CHLORIDE SERPL-SCNC: 107 MMOL/L (ref 95–110)
CO2 SERPL-SCNC: 22 MMOL/L (ref 23–29)
CREAT SERPL-MCNC: 0.6 MG/DL (ref 0.5–1.4)
ERYTHROCYTE [DISTWIDTH] IN BLOOD BY AUTOMATED COUNT: 14.3 % (ref 11.5–14.5)
GFR SERPLBLD CREATININE-BSD FMLA CKD-EPI: >60 ML/MIN/1.73/M2
GLUCOSE SERPL-MCNC: 144 MG/DL (ref 70–110)
HCT VFR BLD AUTO: 31.4 % (ref 37–48.5)
HGB BLD-MCNC: 10.5 GM/DL (ref 12–16)
IMM GRANULOCYTES # BLD AUTO: 0.02 K/UL (ref 0–0.04)
IMM GRANULOCYTES NFR BLD AUTO: 0.3 % (ref 0–0.5)
INFLUENZA A MOLECULAR (OHS): NEGATIVE
INFLUENZA B MOLECULAR (OHS): NEGATIVE
LYMPHOCYTES # BLD AUTO: 1.97 K/UL (ref 1–4.8)
MCH RBC QN AUTO: 25.3 PG (ref 27–31)
MCHC RBC AUTO-ENTMCNC: 33.4 G/DL (ref 32–36)
MCV RBC AUTO: 76 FL (ref 82–98)
NUCLEATED RBC (/100WBC) (SMH): 0 /100 WBC
PLATELET # BLD AUTO: 313 K/UL (ref 150–450)
PMV BLD AUTO: 9.5 FL (ref 9.2–12.9)
POTASSIUM SERPL-SCNC: 3.7 MMOL/L (ref 3.5–5.1)
PROT SERPL-MCNC: 6.3 GM/DL (ref 6–8.4)
RBC # BLD AUTO: 4.15 M/UL (ref 4–5.4)
RELATIVE EOSINOPHIL (SMH): 1.9 % (ref 0–8)
RELATIVE LYMPHOCYTE (SMH): 30.9 % (ref 18–48)
RELATIVE MONOCYTE (SMH): 6.9 % (ref 4–15)
RELATIVE NEUTROPHIL (SMH): 59.4 % (ref 38–73)
SARS-COV-2 RDRP RESP QL NAA+PROBE: NEGATIVE
SODIUM SERPL-SCNC: 139 MMOL/L (ref 136–145)
WBC # BLD AUTO: 6.37 K/UL (ref 3.9–12.7)

## 2025-05-31 PROCEDURE — 80053 COMPREHEN METABOLIC PANEL: CPT

## 2025-05-31 PROCEDURE — 87502 INFLUENZA DNA AMP PROBE: CPT | Performed by: EMERGENCY MEDICINE

## 2025-05-31 PROCEDURE — 85025 COMPLETE CBC W/AUTO DIFF WBC: CPT

## 2025-05-31 PROCEDURE — U0002 COVID-19 LAB TEST NON-CDC: HCPCS

## 2025-05-31 PROCEDURE — 63600175 PHARM REV CODE 636 W HCPCS

## 2025-05-31 PROCEDURE — 25000003 PHARM REV CODE 250: Performed by: EMERGENCY MEDICINE

## 2025-05-31 PROCEDURE — 96375 TX/PRO/DX INJ NEW DRUG ADDON: CPT

## 2025-05-31 PROCEDURE — 96374 THER/PROPH/DIAG INJ IV PUSH: CPT

## 2025-05-31 RX ORDER — DIPHENHYDRAMINE HYDROCHLORIDE 50 MG/ML
25 INJECTION, SOLUTION INTRAMUSCULAR; INTRAVENOUS
Status: COMPLETED | OUTPATIENT
Start: 2025-05-31 | End: 2025-05-31

## 2025-05-31 RX ORDER — PROCHLORPERAZINE EDISYLATE 5 MG/ML
10 INJECTION INTRAMUSCULAR; INTRAVENOUS
Status: COMPLETED | OUTPATIENT
Start: 2025-05-31 | End: 2025-05-31

## 2025-05-31 RX ADMIN — DIPHENHYDRAMINE HYDROCHLORIDE 25 MG: 50 INJECTION INTRAMUSCULAR; INTRAVENOUS at 12:05

## 2025-05-31 RX ADMIN — PROCHLORPERAZINE EDISYLATE 10 MG: 5 INJECTION INTRAMUSCULAR; INTRAVENOUS at 12:05

## 2025-05-31 RX ADMIN — SODIUM CHLORIDE 1000 ML: 9 INJECTION, SOLUTION INTRAVENOUS at 12:05

## 2025-06-03 ENCOUNTER — PATIENT MESSAGE (OUTPATIENT)
Dept: OBSTETRICS AND GYNECOLOGY | Facility: CLINIC | Age: 35
End: 2025-06-03
Payer: MEDICAID

## 2025-06-04 ENCOUNTER — OFFICE VISIT (OUTPATIENT)
Dept: OBSTETRICS AND GYNECOLOGY | Facility: CLINIC | Age: 35
End: 2025-06-04
Payer: MEDICAID

## 2025-06-04 VITALS
DIASTOLIC BLOOD PRESSURE: 100 MMHG | SYSTOLIC BLOOD PRESSURE: 150 MMHG | BODY MASS INDEX: 39.56 KG/M2 | HEIGHT: 62 IN | WEIGHT: 214.94 LBS

## 2025-06-04 DIAGNOSIS — R10.2 PELVIC PAIN: ICD-10-CM

## 2025-06-04 DIAGNOSIS — N93.9 ABNORMAL UTERINE BLEEDING (AUB): Primary | ICD-10-CM

## 2025-06-04 PROCEDURE — 1159F MED LIST DOCD IN RCRD: CPT | Mod: CPTII,,, | Performed by: OBSTETRICS & GYNECOLOGY

## 2025-06-04 PROCEDURE — 3080F DIAST BP >= 90 MM HG: CPT | Mod: CPTII,,, | Performed by: OBSTETRICS & GYNECOLOGY

## 2025-06-04 PROCEDURE — 99214 OFFICE O/P EST MOD 30 MIN: CPT | Mod: S$PBB,,, | Performed by: OBSTETRICS & GYNECOLOGY

## 2025-06-04 PROCEDURE — 88305 TISSUE EXAM BY PATHOLOGIST: CPT | Mod: TC | Performed by: OBSTETRICS & GYNECOLOGY

## 2025-06-04 PROCEDURE — 3077F SYST BP >= 140 MM HG: CPT | Mod: CPTII,,, | Performed by: OBSTETRICS & GYNECOLOGY

## 2025-06-04 PROCEDURE — 99213 OFFICE O/P EST LOW 20 MIN: CPT | Mod: PBBFAC,PO | Performed by: OBSTETRICS & GYNECOLOGY

## 2025-06-04 PROCEDURE — 3008F BODY MASS INDEX DOCD: CPT | Mod: CPTII,,, | Performed by: OBSTETRICS & GYNECOLOGY

## 2025-06-04 PROCEDURE — 99999 PR PBB SHADOW E&M-EST. PATIENT-LVL III: CPT | Mod: PBBFAC,,, | Performed by: OBSTETRICS & GYNECOLOGY

## 2025-06-06 ENCOUNTER — HOSPITAL ENCOUNTER (EMERGENCY)
Facility: HOSPITAL | Age: 35
Discharge: LEFT BEFORE DISCHARGE | End: 2025-06-06
Payer: MEDICAID

## 2025-06-06 VITALS
BODY MASS INDEX: 39.56 KG/M2 | TEMPERATURE: 98 F | RESPIRATION RATE: 18 BRPM | SYSTOLIC BLOOD PRESSURE: 139 MMHG | OXYGEN SATURATION: 98 % | DIASTOLIC BLOOD PRESSURE: 84 MMHG | WEIGHT: 215 LBS | HEIGHT: 62 IN | HEART RATE: 79 BPM

## 2025-06-06 LAB
ESTROGEN SERPL-MCNC: NORMAL PG/ML
INSULIN SERPL-ACNC: NORMAL U[IU]/ML
LAB AP DIAGNOSIS CATEGORY: NORMAL
LAB AP GROSS DESCRIPTION: NORMAL
LAB AP PERFORMING LOCATION(S): NORMAL
LAB AP REPORT FOOTNOTES: NORMAL

## 2025-06-06 PROCEDURE — 99900041 HC LEFT WITHOUT BEING SEEN- EMERGENCY

## 2025-06-07 PROBLEM — E86.0 DEHYDRATION: Status: ACTIVE | Noted: 2025-06-07

## 2025-06-07 PROBLEM — I95.1 ORTHOSTATIC HYPOTENSION: Status: ACTIVE | Noted: 2025-06-07

## 2025-06-08 ENCOUNTER — RESULTS FOLLOW-UP (OUTPATIENT)
Dept: OBSTETRICS AND GYNECOLOGY | Facility: CLINIC | Age: 35
End: 2025-06-08

## 2025-06-11 ENCOUNTER — OFFICE VISIT (OUTPATIENT)
Dept: OTOLARYNGOLOGY | Facility: CLINIC | Age: 35
End: 2025-06-11
Payer: MEDICAID

## 2025-06-11 ENCOUNTER — CLINICAL SUPPORT (OUTPATIENT)
Dept: AUDIOLOGY | Facility: CLINIC | Age: 35
End: 2025-06-11
Payer: MEDICAID

## 2025-06-11 VITALS — WEIGHT: 215.19 LBS | HEIGHT: 62 IN | BODY MASS INDEX: 39.6 KG/M2

## 2025-06-11 DIAGNOSIS — R42 DIZZINESS: ICD-10-CM

## 2025-06-11 DIAGNOSIS — H93.11 TINNITUS AURIUM, RIGHT: Primary | ICD-10-CM

## 2025-06-11 DIAGNOSIS — H93.11 TINNITUS OF RIGHT EAR: Primary | ICD-10-CM

## 2025-06-11 DIAGNOSIS — G44.52 NEW DAILY PERSISTENT HEADACHE: ICD-10-CM

## 2025-06-11 PROCEDURE — 1160F RVW MEDS BY RX/DR IN RCRD: CPT | Mod: CPTII,,, | Performed by: OTOLARYNGOLOGY

## 2025-06-11 PROCEDURE — 92557 COMPREHENSIVE HEARING TEST: CPT | Mod: PBBFAC,PN | Performed by: AUDIOLOGIST

## 2025-06-11 PROCEDURE — 3044F HG A1C LEVEL LT 7.0%: CPT | Mod: CPTII,,, | Performed by: OTOLARYNGOLOGY

## 2025-06-11 PROCEDURE — 3008F BODY MASS INDEX DOCD: CPT | Mod: CPTII,,, | Performed by: OTOLARYNGOLOGY

## 2025-06-11 PROCEDURE — 99211 OFF/OP EST MAY X REQ PHY/QHP: CPT | Mod: PBBFAC,PN | Performed by: AUDIOLOGIST

## 2025-06-11 PROCEDURE — 99999 PR PBB SHADOW E&M-EST. PATIENT-LVL III: CPT | Mod: PBBFAC,,, | Performed by: OTOLARYNGOLOGY

## 2025-06-11 PROCEDURE — 92567 TYMPANOMETRY: CPT | Mod: PBBFAC,PN | Performed by: AUDIOLOGIST

## 2025-06-11 PROCEDURE — 99213 OFFICE O/P EST LOW 20 MIN: CPT | Mod: PBBFAC,27,PN | Performed by: OTOLARYNGOLOGY

## 2025-06-11 PROCEDURE — 99204 OFFICE O/P NEW MOD 45 MIN: CPT | Mod: S$PBB,,, | Performed by: OTOLARYNGOLOGY

## 2025-06-11 PROCEDURE — 1159F MED LIST DOCD IN RCRD: CPT | Mod: CPTII,,, | Performed by: OTOLARYNGOLOGY

## 2025-06-11 PROCEDURE — 99999 PR PBB SHADOW E&M-EST. PATIENT-LVL I: CPT | Mod: PBBFAC,,, | Performed by: AUDIOLOGIST

## 2025-06-11 RX ORDER — MUPIROCIN 20 MG/G
OINTMENT TOPICAL
COMMUNITY
Start: 2025-06-10

## 2025-06-11 RX ORDER — SULFAMETHOXAZOLE AND TRIMETHOPRIM 800; 160 MG/1; MG/1
1 TABLET ORAL 2 TIMES DAILY
COMMUNITY
Start: 2025-06-10

## 2025-06-11 NOTE — PROGRESS NOTES
"Subjective:       Patient ID: Patricia James is a 35 y.o. female.    Chief Complaint: Dizziness (Patient here with c/o " I have whooshing in my right ear and then I got dizzy Friday out of the blue and its been consistent." )      5-year-old comes in with family and a history of about two weeks ago noticing what is called a "whooshing" noise in her right ear no ear pain or obvious hearing loss, she tells me it does not occur at the same rate as her pulse it does not sound like her pulse.      Friday which was five days ago she started to have some dizziness that she mentions occurs when she moves her eyes and makes her feel like the room is moving not spinning and that is occurs just sitting down or even lying in bed that is not a vertigo sensation although a movement sensation of the room is technically vertigo.    In addition she has has a persistent headache since this all began and that is a new problem for her.          Objective:      ENT Physical Exam    Her tympanic membranes and ear canals look normal that is no obvious abnormality to explain her complaints based on physical exam        Assessment:       1. Tinnitus of right ear    2. Dizziness         Plan:          So I am not sure what is going on with her the fact that she has a new tinnitus in the right ear and new onset of headaches and new onset of dizziness all seems like it should go together but I am not sure that I can put it all together in any case perfectly normal audiogram normal exam a story of dizziness that is really not vertigo in his more likely headache related I have ordered an MR scan of her head that if we can get a proved we will help rule out some of the potential concerning issues that could have led to this and assuming that is normal I do not think that is much from an ear nose and throat standpoint for us to do perhaps neurology consult        "

## 2025-06-11 NOTE — PROGRESS NOTES
"Patricia James was seen 06/11/2025 by Dr. Farley and referred for an audiological evaluation. Pt was alone during today's visit. Pertinent complains today include dizziness and right ear "whooshing" tinnitus. Pt states the tinnitus started a few weeks ago and dizziness started last Friday, 6/6/25. Pt confirms history of loud noise exposure. Otoscopy revealed no cerumen in either ear. The tympanic membrane was visualized AU prior to proceeding with the hearing testing.      Results reveal normal hearing from 250-8000Hz bilaterally.    Speech Reception Thresholds were  15 dBHL for the right ear and 10 dBHL for the left ear.    Word recognition scores were excellent bilaterally.   Tympanograms were Type A for the right ear and Type A for the left ear.    Audiogram results were reviewed in detail with patient and all questions were answered. Results will be reviewed by the referring provider at the completion of this note.  All complaints were addressed during this visit to the patient's satisfaction.     Recommendations:  Follow up with ENT  Repeat audiogram per ENT treatment plan  Hearing protection in noise    "

## 2025-06-13 ENCOUNTER — OFFICE VISIT (OUTPATIENT)
Dept: URGENT CARE | Facility: CLINIC | Age: 35
End: 2025-06-13
Payer: MEDICAID

## 2025-06-13 VITALS
DIASTOLIC BLOOD PRESSURE: 90 MMHG | RESPIRATION RATE: 17 BRPM | HEART RATE: 85 BPM | BODY MASS INDEX: 38.64 KG/M2 | SYSTOLIC BLOOD PRESSURE: 148 MMHG | OXYGEN SATURATION: 97 % | WEIGHT: 210 LBS | HEIGHT: 62 IN | TEMPERATURE: 98 F

## 2025-06-13 DIAGNOSIS — B35.4 TINEA CORPORIS: Primary | ICD-10-CM

## 2025-06-13 RX ORDER — CLOTRIMAZOLE AND BETAMETHASONE DIPROPIONATE 10; .64 MG/G; MG/G
CREAM TOPICAL 2 TIMES DAILY
Qty: 60 G | Refills: 0 | Status: SHIPPED | OUTPATIENT
Start: 2025-06-13 | End: 2025-06-23

## 2025-06-13 NOTE — PROGRESS NOTES
"Subjective:      Patient ID: Patricia James is a 35 y.o. female.    Vitals:  height is 5' 2" (1.575 m) and weight is 95.3 kg (210 lb). Her temperature is 98.4 °F (36.9 °C). Her blood pressure is 148/90 (abnormal) and her pulse is 85. Her respiration is 17 and oxygen saturation is 97%.     Chief Complaint: skin infection    Patient thinks she has a spider bite or ring worm on her inner right thigh that she noticed last night. Patient states its itchy. H/o staph infection in the past. Currently taking Bactrim for an area that is healing on her left arm.      Constitution: Negative.   HENT: Negative.     Neck: neck negative.   Cardiovascular: Negative.    Eyes: Negative.    Respiratory: Negative.     Gastrointestinal: Negative.    Endocrine: negative.   Genitourinary: Negative.    Musculoskeletal: Negative.    Skin:  Positive for rash and erythema.   Allergic/Immunologic: Positive for itching.   Neurological: Negative.    Hematologic/Lymphatic: Negative.    Psychiatric/Behavioral: Negative.        Objective:     Physical Exam   Constitutional: She is oriented to person, place, and time. She appears well-developed.   HENT:   Head: Normocephalic and atraumatic. Head is without abrasion, without contusion and without laceration.   Ears:   Right Ear: External ear normal.   Left Ear: External ear normal.   Nose: Nose normal.   Mouth/Throat: Oropharynx is clear and moist and mucous membranes are normal. Mucous membranes are moist.   Eyes: Conjunctivae, EOM and lids are normal. Pupils are equal, round, and reactive to light.   Neck: Trachea normal and phonation normal. Neck supple.   Pulmonary/Chest: Effort normal.   Musculoskeletal: Normal range of motion.         General: Normal range of motion.   Neurological: no focal deficit. She is alert and oriented to person, place, and time.   Skin: Skin is warm, dry, intact and rash. Capillary refill takes less than 2 seconds. erythema No abrasion, No burn, No bruising and " No ecchymosis         Comments: Right thigh has a dime sized circular rash with central clearing and faint scaling   Psychiatric: Her speech is normal and behavior is normal. Judgment and thought content normal.   Nursing note and vitals reviewed.      Assessment:     1. Tinea corporis        Plan:       Tinea corporis  -     clotrimazole-betamethasone 1-0.05% (LOTRISONE) cream; Apply topically 2 (two) times daily. for 10 days  Dispense: 60 g; Refill: 0              Keep area clean and dry    Apply Lotrisone cream twice daily for the next 10-14 days    Can return here if area gets worse    Frequently wash towels and sheets

## 2025-06-13 NOTE — PATIENT INSTRUCTIONS
Keep area clean and dry    Apply Lotrisone cream twice daily for the next 10-14 days    Can return here if area gets worse    Frequently wash towels and sheets

## 2025-06-16 ENCOUNTER — TELEPHONE (OUTPATIENT)
Dept: URGENT CARE | Facility: CLINIC | Age: 35
End: 2025-06-16
Payer: MEDICAID

## 2025-06-16 NOTE — TELEPHONE ENCOUNTER
Called and spoke with patient for wellness call since OV on 6/13/25. Patient stated she is doing ok. Continues to use prescribed medication.

## 2025-06-19 ENCOUNTER — HOSPITAL ENCOUNTER (OUTPATIENT)
Dept: RADIOLOGY | Facility: HOSPITAL | Age: 35
Discharge: HOME | End: 2025-06-19
Attending: OTOLARYNGOLOGY
Payer: MEDICAID

## 2025-06-19 DIAGNOSIS — G44.52 NEW DAILY PERSISTENT HEADACHE: ICD-10-CM

## 2025-06-19 DIAGNOSIS — R42 DIZZINESS: ICD-10-CM

## 2025-06-19 DIAGNOSIS — H93.11 TINNITUS OF RIGHT EAR: ICD-10-CM

## 2025-06-19 PROCEDURE — 70551 MRI BRAIN STEM W/O DYE: CPT | Mod: 26,,, | Performed by: RADIOLOGY

## 2025-06-19 PROCEDURE — 70551 MRI BRAIN STEM W/O DYE: CPT | Mod: TC,PO

## 2025-06-20 ENCOUNTER — RESULTS FOLLOW-UP (OUTPATIENT)
Dept: OTOLARYNGOLOGY | Facility: CLINIC | Age: 35
End: 2025-06-20
Payer: MEDICAID

## 2025-06-20 DIAGNOSIS — N93.9 ABNORMAL UTERINE BLEEDING: ICD-10-CM

## 2025-06-20 DIAGNOSIS — R42 DIZZINESS: ICD-10-CM

## 2025-06-20 DIAGNOSIS — N93.9 ABNORMAL UTERINE BLEEDING (AUB): Primary | ICD-10-CM

## 2025-06-20 DIAGNOSIS — G44.52 NEW DAILY PERSISTENT HEADACHE: ICD-10-CM

## 2025-06-20 DIAGNOSIS — H93.11 TINNITUS OF RIGHT EAR: Primary | ICD-10-CM

## 2025-06-20 RX ORDER — MUPIROCIN 20 MG/G
OINTMENT TOPICAL
OUTPATIENT
Start: 2025-06-20

## 2025-06-20 RX ORDER — SODIUM CHLORIDE 9 MG/ML
INJECTION, SOLUTION INTRAVENOUS CONTINUOUS
OUTPATIENT
Start: 2025-06-20

## 2025-06-20 NOTE — PROGRESS NOTES
She is in the hospital today, Friday so maybe check next week and see if she got my message on the portal and tell her that the MRI scan had some abnormalities that I am not able to evaluate and they may not be very much to them but a neurologist needs to discuss that and consider it and I put in a consult

## 2025-06-22 ENCOUNTER — HOSPITAL ENCOUNTER (EMERGENCY)
Facility: HOSPITAL | Age: 35
Discharge: HOME | End: 2025-06-23
Attending: EMERGENCY MEDICINE
Payer: MEDICAID

## 2025-06-22 DIAGNOSIS — R42 DIZZINESS: Primary | ICD-10-CM

## 2025-06-22 PROCEDURE — 99284 EMERGENCY DEPT VISIT MOD MDM: CPT | Mod: 25

## 2025-06-23 VITALS
HEART RATE: 83 BPM | OXYGEN SATURATION: 98 % | TEMPERATURE: 98 F | BODY MASS INDEX: 39.56 KG/M2 | WEIGHT: 215 LBS | DIASTOLIC BLOOD PRESSURE: 88 MMHG | HEIGHT: 62 IN | RESPIRATION RATE: 17 BRPM | SYSTOLIC BLOOD PRESSURE: 145 MMHG

## 2025-06-23 LAB
ABSOLUTE EOSINOPHIL (SMH): 0.15 K/UL
ABSOLUTE MONOCYTE (SMH): 0.41 K/UL (ref 0.3–1)
ABSOLUTE NEUTROPHIL COUNT (SMH): 4.5 K/UL (ref 1.8–7.7)
ALBUMIN SERPL-MCNC: 4.7 G/DL (ref 3.5–5.2)
ALP SERPL-CCNC: 68 UNIT/L (ref 55–135)
ALT SERPL-CCNC: 24 UNIT/L (ref 10–44)
ANION GAP (SMH): 8 MMOL/L (ref 8–16)
AST SERPL-CCNC: 13 UNIT/L (ref 10–40)
B-HCG FREE SERPL-ACNC: <0.6 MIU/ML
BASOPHILS # BLD AUTO: 0.04 K/UL
BASOPHILS NFR BLD AUTO: 0.6 %
BILIRUB SERPL-MCNC: 0.4 MG/DL (ref 0.1–1)
BUN SERPL-MCNC: 9 MG/DL (ref 6–20)
CALCIUM SERPL-MCNC: 9.1 MG/DL (ref 8.7–10.5)
CHLORIDE SERPL-SCNC: 105 MMOL/L (ref 95–110)
CO2 SERPL-SCNC: 25 MMOL/L (ref 23–29)
CREAT SERPL-MCNC: 0.6 MG/DL (ref 0.5–1.4)
ERYTHROCYTE [DISTWIDTH] IN BLOOD BY AUTOMATED COUNT: 15.1 % (ref 11.5–14.5)
GFR SERPLBLD CREATININE-BSD FMLA CKD-EPI: >60 ML/MIN/1.73/M2
GLUCOSE SERPL-MCNC: 154 MG/DL (ref 70–110)
HCT VFR BLD AUTO: 37 % (ref 37–48.5)
HGB BLD-MCNC: 11.7 GM/DL (ref 12–16)
IMM GRANULOCYTES # BLD AUTO: 0.03 K/UL (ref 0–0.04)
IMM GRANULOCYTES NFR BLD AUTO: 0.4 % (ref 0–0.5)
LYMPHOCYTES # BLD AUTO: 1.84 K/UL (ref 1–4.8)
MCH RBC QN AUTO: 24.9 PG (ref 27–31)
MCHC RBC AUTO-ENTMCNC: 31.6 G/DL (ref 32–36)
MCV RBC AUTO: 79 FL (ref 82–98)
NUCLEATED RBC (/100WBC) (SMH): 0 /100 WBC
PLATELET # BLD AUTO: 311 K/UL (ref 150–450)
PMV BLD AUTO: 9.4 FL (ref 9.2–12.9)
POTASSIUM SERPL-SCNC: 3.7 MMOL/L (ref 3.5–5.1)
PROT SERPL-MCNC: 7.8 GM/DL (ref 6–8.4)
RBC # BLD AUTO: 4.69 M/UL (ref 4–5.4)
RELATIVE EOSINOPHIL (SMH): 2.2 % (ref 0–8)
RELATIVE LYMPHOCYTE (SMH): 26.6 % (ref 18–48)
RELATIVE MONOCYTE (SMH): 5.9 % (ref 4–15)
RELATIVE NEUTROPHIL (SMH): 64.3 % (ref 38–73)
SODIUM SERPL-SCNC: 138 MMOL/L (ref 136–145)
TSH SERPL-ACNC: 1.57 UIU/ML (ref 0.34–5.6)
WBC # BLD AUTO: 6.92 K/UL (ref 3.9–12.7)

## 2025-06-23 PROCEDURE — 85025 COMPLETE CBC W/AUTO DIFF WBC: CPT | Performed by: EMERGENCY MEDICINE

## 2025-06-23 PROCEDURE — 84702 CHORIONIC GONADOTROPIN TEST: CPT | Performed by: EMERGENCY MEDICINE

## 2025-06-23 PROCEDURE — 96360 HYDRATION IV INFUSION INIT: CPT

## 2025-06-23 PROCEDURE — 96361 HYDRATE IV INFUSION ADD-ON: CPT

## 2025-06-23 PROCEDURE — 84443 ASSAY THYROID STIM HORMONE: CPT | Performed by: EMERGENCY MEDICINE

## 2025-06-23 PROCEDURE — 25000003 PHARM REV CODE 250: Performed by: EMERGENCY MEDICINE

## 2025-06-23 PROCEDURE — 84460 ALANINE AMINO (ALT) (SGPT): CPT | Performed by: EMERGENCY MEDICINE

## 2025-06-23 RX ADMIN — SODIUM CHLORIDE 1000 ML: 9 INJECTION, SOLUTION INTRAVENOUS at 12:06

## 2025-06-23 NOTE — DISCHARGE INSTRUCTIONS
Please drink plenty fluids take your vitamins rest eat a proper diet follow up with Dr. Christianson  in the next 2 days.

## 2025-06-23 NOTE — ED PROVIDER NOTES
"Encounter Date: 2025       History     Chief Complaint   Patient presents with    Loss of Consciousness     Reports "passed out " in her care prior to coming in the ER. Recently admitted to [facility] for dehydration. States is not eating or drinking much     Dizziness    Headache     Chief complaint is dizziness.  She says it is difficult to describe.  She says she always feels full has not been eating or drinking well.  She thinks she may be dehydrated.  Three weeks ago she was admitted to [facility] for dehydration was given fluids admitted overnight.  She said she had a recent brain MRI with some questionable abnormalities as well.    Past medical history negative except for ligament problems with surgeries to her right arm in the past.  Medicines none social history does smoke or drink she is awake alert just feels kind of woozy.        Review of patient's allergies indicates:   Allergen Reactions    Ciprofloxacin Itching and Rash    Keflex [cephalexin]      rash    Latex, natural rubber Hives     Past Medical History:   Diagnosis Date    Renal disorder     kidney stone     Past Surgical History:   Procedure Laterality Date     SECTION      x 2    VULVECTOMY Left 2022    Procedure: VULVECTOMY, EXCISION OF VULVAR MASS AND OTHER INDICATED PROCEDURES;  Surgeon: Miguel Ángel Arrington MD;  Location: [facility] OR;  Service: OB/GYN;  Laterality: Left;    WRIST ARTHROSCOPY W/ TRIANGULAR FIBROCARTILAGE REPAIR       Family History   Problem Relation Name Age of Onset    Cancer Maternal Grandmother      Heart disease Maternal Grandmother      Leukemia Maternal Grandmother      Hyperlipidemia Father      Hypertension Father      Diabetes Father      Breast cancer Maternal Aunt      Ovarian cancer Maternal Aunt       Social History[1]  Review of Systems   Constitutional:  Negative for chills and fever.   HENT:  Negative for ear pain, rhinorrhea and sore throat.    Eyes:  Negative for pain and visual disturbance. "   Respiratory:  Negative for cough and shortness of breath.    Cardiovascular:  Negative for chest pain and palpitations.   Gastrointestinal:  Negative for abdominal pain, constipation, diarrhea, nausea and vomiting.   Genitourinary:  Negative for dysuria, frequency, hematuria and urgency.   Musculoskeletal:  Negative for back pain, joint swelling and myalgias.   Skin:  Negative for rash.   Neurological:  Positive for weakness. Negative for dizziness, seizures and headaches.   Psychiatric/Behavioral:  Negative for dysphoric mood. The patient is not nervous/anxious.        Physical Exam     Initial Vitals [06/22/25 2152]   BP Pulse Resp Temp SpO2   (!) 146/94 87 17 98.3 °F (36.8 °C) 97 %      MAP       --         Physical Exam    Nursing note and vitals reviewed.  Constitutional: She appears well-developed and well-nourished.   HENT:   Head: Normocephalic and atraumatic.   Eyes: Conjunctivae, EOM and lids are normal. Pupils are equal, round, and reactive to light.   Neck: Trachea normal. Neck supple. No thyroid mass and no thyromegaly present.   Normal range of motion.  Cardiovascular:  Normal rate, regular rhythm and normal heart sounds.           Pulmonary/Chest: Effort normal and breath sounds normal.   Abdominal: Abdomen is soft. There is no abdominal tenderness.   Musculoskeletal:         General: Normal range of motion.      Cervical back: Normal range of motion and neck supple.     Neurological: She is alert and oriented to person, place, and time. She has normal strength and normal reflexes. No cranial nerve deficit or sensory deficit.   Skin: Skin is warm and dry.   Psychiatric: She has a normal mood and affect. Her speech is normal and behavior is normal. Judgment and thought content normal.         ED Course   Procedures  Labs Reviewed   COMPREHENSIVE METABOLIC PANEL - Abnormal       Result Value    Sodium 138      Potassium 3.7      Chloride 105      CO2 25      Glucose 154 (*)     BUN 9      Creatinine  0.6      Calcium 9.1      Protein Total 7.8      Albumin 4.7      Bilirubin Total 0.4      ALP 68      AST 13      ALT 24      Anion Gap 8      eGFR >60     CBC WITH DIFFERENTIAL - Abnormal    WBC 6.92      RBC 4.69      Hgb 11.7 (*)     Hct 37.0      MCV 79 (*)     MCH 24.9 (*)     MCHC 31.6 (*)     RDW 15.1 (*)     Platelet Count 311      MPV 9.4      Nucleated RBC 0      Neut % 64.3      Lymph % 26.6      Mono % 5.9      Eos % 2.2      Basophil % 0.6      Imm Grans % 0.4      Neut # 4.5      Lymph # 1.84      Mono # 0.41      Eos # 0.15      Baso # 0.04      Imm Grans # 0.03     TSH - Normal    TSH 1.568     CBC W/ AUTO DIFFERENTIAL    Narrative:     The following orders were created for panel order CBC auto differential.  Procedure                               Abnormality         Status                     ---------                               -----------         ------                     CBC with Differential[[id number]]       Abnormal            Final result                 Please view results for these tests on the individual orders.   HCG, QUANTITATIVE    Beta HCG Quant <0.60            Imaging Results    None          Medications   sodium chloride 0.9% bolus 1,000 mL 1,000 mL (0 mLs Intravenous Stopped 6/23/25 2864)     Medical Decision Making  Amount and/or Complexity of Data Reviewed  Labs: ordered.                                      Clinical Impression:  Final diagnoses:  [R42] Dizziness (Primary)          ED Disposition Condition    Discharge Stable          ED Prescriptions    None       Follow-up Information    None                [1]   Social History  Tobacco Use    Smoking status: Never    Smokeless tobacco: Never   Substance Use Topics    Alcohol use: Yes     Comment: occasionally    Drug use: No        Jonathan Stringer MD  06/23/25 0924

## 2025-06-26 LAB
OHS QRS DURATION: 84 MS
OHS QTC CALCULATION: 439 MS

## 2025-07-03 ENCOUNTER — ANESTHESIA EVENT (OUTPATIENT)
Dept: SURGERY | Facility: HOSPITAL | Age: 35
End: 2025-07-03
Payer: MEDICAID

## 2025-07-03 ENCOUNTER — HOSPITAL ENCOUNTER (OUTPATIENT)
Dept: PREADMISSION TESTING | Facility: HOSPITAL | Age: 35
Discharge: HOME | End: 2025-07-03
Attending: OBSTETRICS & GYNECOLOGY
Payer: MEDICAID

## 2025-07-03 NOTE — DISCHARGE INSTRUCTIONS
To confirm, Your doctor has instructed you that surgery is scheduled for: 7/7/25 with Murphy    Please report to [facility], Registration the morning of surgery. You must check-in and receive a wristband before going to your procedure.  [address] [place] [address] [place] [address]    Pre-Op will call the afternoon prior to surgery between 1:00 and 6:00 PM with the final arrival time.  Phone number: [phone]    PLEASE NOTE:  The surgery schedule has many variables which may affect the time of your surgery case.  Family members should be available if your surgery time changes.  Plan to be here the day of your procedure between 4-6 hours.    MEDICATIONS:  TAKE ONLY THESE MEDICATIONS WITH A SMALL SIP OF WATER THE MORNING OF YOUR PROCEDURE:  -0-    DO NOT TAKE THESE MEDICATIONS 5-7 DAYS PRIOR to your procedure or per your surgeon's request:   ASPIRIN, ALEVE, ADVIL, IBUPROFEN, FISH OIL VITAMIN E, HERBALS    (May take Tylenol)    ONLY if you are prescribed any types of blood thinners such as:  Aspirin, Coumadin, Plavix, Pradaxa, Xarelto, Aggrenox, Effient, Eliquis, Savasya, Brilinta, or any other, ask your surgeon whether you should stop taking them and how long before surgery you should stop.  You may also need to verify with the prescribing physician if it is ok to stop your medication.      INSTRUCTIONS IMPORTANT!!  Do not eat or drink anything between midnight and the time of your procedure- this includes gum, mints, and candy.  EXCEPT: you may have clear liquids such as:  WATER, BLACK COFFEE, UNSWEET TEA, OR GATORADE (NO RED OR PURPLE) UP TO 2 HOURS PRIOR TO YOUR ARRIVAL TIME.  Do not smoke or drink alcoholic beverages 24 hours prior to your procedure.  Shower the night before AND the morning of your procedure with a Chlorhexidine wash such as Hibiclens or Dial antibacterial soap from the neck down.  Do not get it on your face or in your eyes.  You may use your own shampoo and face wash. This  helps your skin to be as bacteria free as possible.    If you wear contact lenses, dentures, hearing aids or glasses, bring a container to put them in during surgery and give to a family member for safe keeping.  Please leave all jewelry, piercing's and valuables at home. You must remove your false eyelashes prior to surgery.    DO NOT remove hair from the surgery site.  Do not shave the incision site unless you are given specific instructions to do so.    ONLY if you have been diagnosed with sleep apnea please bring your C-PAP machine.  ONLY if you wear home oxygen please bring your portable oxygen tank the day of your procedure.  ONLY if you have a history of OPEN HEART SURGERY you will need a clearance from your Cardiologist per Anesthesia.      ONLY for patients requiring bowel prep, written instructions will be given by your doctor's office.  ONLY if you have any type of stimulator implant or any type of implanted device with a remote control.  Please bring the controller with you the morning of surgery  If your doctor has scheduled you for an overnight stay, bring a small overnight bag with any personal items you need.  Make arrangements in advance for transportation home by a responsible adult. You can not go home in an uber or a cab per hospital policy.  It is not safe to drive a vehicle during the 24 hours after anesthesia.          All  facilities and properties are tobacco free.  Smoking is NOT allowed.   If you have any questions about these instructions, call Pre-Op Admit  Nursing at [phone] or the Pre-Op Day Surgery Unit at [phone].

## 2025-07-07 ENCOUNTER — ANESTHESIA (OUTPATIENT)
Dept: SURGERY | Facility: HOSPITAL | Age: 35
End: 2025-07-07
Payer: MEDICAID

## 2025-07-07 ENCOUNTER — NURSE TRIAGE (OUTPATIENT)
Dept: ADMINISTRATIVE | Facility: CLINIC | Age: 35
End: 2025-07-07
Payer: MEDICAID

## 2025-07-07 ENCOUNTER — HOSPITAL ENCOUNTER (OUTPATIENT)
Facility: HOSPITAL | Age: 35
Discharge: HOME | End: 2025-07-07
Attending: OBSTETRICS & GYNECOLOGY | Admitting: OBSTETRICS & GYNECOLOGY
Payer: MEDICAID

## 2025-07-07 ENCOUNTER — HOSPITAL ENCOUNTER (EMERGENCY)
Facility: HOSPITAL | Age: 35
Discharge: HOME | End: 2025-07-07
Attending: EMERGENCY MEDICINE
Payer: MEDICAID

## 2025-07-07 VITALS
DIASTOLIC BLOOD PRESSURE: 63 MMHG | SYSTOLIC BLOOD PRESSURE: 114 MMHG | OXYGEN SATURATION: 96 % | HEIGHT: 62 IN | RESPIRATION RATE: 16 BRPM | TEMPERATURE: 99 F | BODY MASS INDEX: 39.56 KG/M2 | WEIGHT: 215 LBS | HEART RATE: 87 BPM

## 2025-07-07 DIAGNOSIS — R11.0 NAUSEA: ICD-10-CM

## 2025-07-07 DIAGNOSIS — N93.9 ABNORMAL UTERINE BLEEDING: ICD-10-CM

## 2025-07-07 DIAGNOSIS — N93.9 ABNORMAL UTERINE BLEEDING (AUB): Primary | ICD-10-CM

## 2025-07-07 DIAGNOSIS — Z98.890 POSTOPERATIVE NAUSEA AND VOMITING: Primary | ICD-10-CM

## 2025-07-07 DIAGNOSIS — R11.2 POSTOPERATIVE NAUSEA AND VOMITING: Primary | ICD-10-CM

## 2025-07-07 LAB
B-HCG UR QL: NEGATIVE
CTP QC/QA: YES
POCT GLUCOSE: 139 MG/DL (ref 70–110)

## 2025-07-07 PROCEDURE — 63600175 PHARM REV CODE 636 W HCPCS: Performed by: ANESTHESIOLOGY

## 2025-07-07 PROCEDURE — 36000707: Performed by: OBSTETRICS & GYNECOLOGY

## 2025-07-07 PROCEDURE — 63600175 PHARM REV CODE 636 W HCPCS: Performed by: EMERGENCY MEDICINE

## 2025-07-07 PROCEDURE — 94799 UNLISTED PULMONARY SVC/PX: CPT

## 2025-07-07 PROCEDURE — 71000039 HC RECOVERY, EACH ADD'L HOUR: Performed by: OBSTETRICS & GYNECOLOGY

## 2025-07-07 PROCEDURE — 93010 ELECTROCARDIOGRAM REPORT: CPT | Mod: ,,, | Performed by: GENERAL PRACTICE

## 2025-07-07 PROCEDURE — 96374 THER/PROPH/DIAG INJ IV PUSH: CPT

## 2025-07-07 PROCEDURE — 81025 URINE PREGNANCY TEST: CPT | Performed by: ANESTHESIOLOGY

## 2025-07-07 PROCEDURE — 96375 TX/PRO/DX INJ NEW DRUG ADDON: CPT

## 2025-07-07 PROCEDURE — 37000008 HC ANESTHESIA 1ST 15 MINUTES: Performed by: OBSTETRICS & GYNECOLOGY

## 2025-07-07 PROCEDURE — 37000009 HC ANESTHESIA EA ADD 15 MINS: Performed by: OBSTETRICS & GYNECOLOGY

## 2025-07-07 PROCEDURE — 93005 ELECTROCARDIOGRAM TRACING: CPT

## 2025-07-07 PROCEDURE — 25000003 PHARM REV CODE 250: Performed by: ANESTHESIOLOGY

## 2025-07-07 PROCEDURE — 36000706: Performed by: OBSTETRICS & GYNECOLOGY

## 2025-07-07 PROCEDURE — 63600175 PHARM REV CODE 636 W HCPCS: Performed by: NURSE ANESTHETIST, CERTIFIED REGISTERED

## 2025-07-07 PROCEDURE — 71000033 HC RECOVERY, INTIAL HOUR: Performed by: OBSTETRICS & GYNECOLOGY

## 2025-07-07 PROCEDURE — 71000016 HC POSTOP RECOV ADDL HR: Performed by: OBSTETRICS & GYNECOLOGY

## 2025-07-07 PROCEDURE — 96361 HYDRATE IV INFUSION ADD-ON: CPT

## 2025-07-07 PROCEDURE — 25000003 PHARM REV CODE 250: Performed by: EMERGENCY MEDICINE

## 2025-07-07 PROCEDURE — 99284 EMERGENCY DEPT VISIT MOD MDM: CPT | Mod: 25

## 2025-07-07 PROCEDURE — 63600175 PHARM REV CODE 636 W HCPCS: Mod: TB,JZ | Performed by: NURSE ANESTHETIST, CERTIFIED REGISTERED

## 2025-07-07 PROCEDURE — 27201423 OPTIME MED/SURG SUP & DEVICES STERILE SUPPLY: Performed by: OBSTETRICS & GYNECOLOGY

## 2025-07-07 PROCEDURE — 71000015 HC POSTOP RECOV 1ST HR: Performed by: OBSTETRICS & GYNECOLOGY

## 2025-07-07 PROCEDURE — 25000003 PHARM REV CODE 250: Performed by: OBSTETRICS & GYNECOLOGY

## 2025-07-07 PROCEDURE — 58563 HYSTEROSCOPY ABLATION: CPT | Mod: ,,, | Performed by: OBSTETRICS & GYNECOLOGY

## 2025-07-07 RX ORDER — KETOROLAC TROMETHAMINE 30 MG/ML
INJECTION, SOLUTION INTRAMUSCULAR; INTRAVENOUS
Status: DISCONTINUED | OUTPATIENT
Start: 2025-07-07 | End: 2025-07-07

## 2025-07-07 RX ORDER — ONDANSETRON HYDROCHLORIDE 2 MG/ML
INJECTION, SOLUTION INTRAMUSCULAR; INTRAVENOUS
Status: DISCONTINUED | OUTPATIENT
Start: 2025-07-07 | End: 2025-07-07

## 2025-07-07 RX ORDER — IBUPROFEN 600 MG/1
600 TABLET, FILM COATED ORAL EVERY 6 HOURS PRN
Qty: 40 TABLET | Refills: 1 | Status: SHIPPED | OUTPATIENT
Start: 2025-07-07

## 2025-07-07 RX ORDER — LORAZEPAM 2 MG/ML
0.5 INJECTION INTRAMUSCULAR ONCE
Status: COMPLETED | OUTPATIENT
Start: 2025-07-07 | End: 2025-07-07

## 2025-07-07 RX ORDER — PROPOFOL 10 MG/ML
VIAL (ML) INTRAVENOUS
Status: DISCONTINUED | OUTPATIENT
Start: 2025-07-07 | End: 2025-07-07

## 2025-07-07 RX ORDER — MUPIROCIN 20 MG/G
OINTMENT TOPICAL
Status: DISCONTINUED | OUTPATIENT
Start: 2025-07-07 | End: 2025-07-07 | Stop reason: HOSPADM

## 2025-07-07 RX ORDER — HYDROMORPHONE HYDROCHLORIDE 2 MG/ML
0.2 INJECTION, SOLUTION INTRAMUSCULAR; INTRAVENOUS; SUBCUTANEOUS EVERY 5 MIN PRN
Status: COMPLETED | OUTPATIENT
Start: 2025-07-07 | End: 2025-07-07

## 2025-07-07 RX ORDER — FENTANYL CITRATE 50 UG/ML
INJECTION, SOLUTION INTRAMUSCULAR; INTRAVENOUS
Status: DISCONTINUED | OUTPATIENT
Start: 2025-07-07 | End: 2025-07-07

## 2025-07-07 RX ORDER — LIDOCAINE HYDROCHLORIDE 10 MG/ML
1 INJECTION, SOLUTION EPIDURAL; INFILTRATION; INTRACAUDAL; PERINEURAL ONCE
Status: DISCONTINUED | OUTPATIENT
Start: 2025-07-07 | End: 2025-07-07 | Stop reason: HOSPADM

## 2025-07-07 RX ORDER — ONDANSETRON HYDROCHLORIDE 2 MG/ML
4 INJECTION, SOLUTION INTRAVENOUS ONCE AS NEEDED
Status: COMPLETED | OUTPATIENT
Start: 2025-07-07 | End: 2025-07-07

## 2025-07-07 RX ORDER — GLUCAGON 1 MG
1 KIT INJECTION
Status: DISCONTINUED | OUTPATIENT
Start: 2025-07-07 | End: 2025-07-07 | Stop reason: HOSPADM

## 2025-07-07 RX ORDER — OXYCODONE HYDROCHLORIDE 5 MG/1
5 TABLET ORAL
Status: DISCONTINUED | OUTPATIENT
Start: 2025-07-07 | End: 2025-07-07 | Stop reason: HOSPADM

## 2025-07-07 RX ORDER — PROCHLORPERAZINE EDISYLATE 5 MG/ML
5 INJECTION INTRAMUSCULAR; INTRAVENOUS
Status: COMPLETED | OUTPATIENT
Start: 2025-07-07 | End: 2025-07-07

## 2025-07-07 RX ORDER — SODIUM CHLORIDE 9 MG/ML
INJECTION, SOLUTION INTRAVENOUS CONTINUOUS
Status: DISCONTINUED | OUTPATIENT
Start: 2025-07-07 | End: 2025-07-07 | Stop reason: HOSPADM

## 2025-07-07 RX ORDER — DIPHENHYDRAMINE HYDROCHLORIDE 50 MG/ML
12.5 INJECTION, SOLUTION INTRAMUSCULAR; INTRAVENOUS
Status: COMPLETED | OUTPATIENT
Start: 2025-07-07 | End: 2025-07-07

## 2025-07-07 RX ORDER — ONDANSETRON 4 MG/1
4 TABLET, ORALLY DISINTEGRATING ORAL EVERY 6 HOURS PRN
Qty: 30 TABLET | Refills: 0 | Status: SHIPPED | OUTPATIENT
Start: 2025-07-07

## 2025-07-07 RX ORDER — FENTANYL CITRATE 50 UG/ML
25 INJECTION, SOLUTION INTRAMUSCULAR; INTRAVENOUS EVERY 5 MIN PRN
Status: COMPLETED | OUTPATIENT
Start: 2025-07-07 | End: 2025-07-07

## 2025-07-07 RX ORDER — DIPHENHYDRAMINE HYDROCHLORIDE 50 MG/ML
12.5 INJECTION, SOLUTION INTRAMUSCULAR; INTRAVENOUS EVERY 6 HOURS PRN
Status: DISCONTINUED | OUTPATIENT
Start: 2025-07-07 | End: 2025-07-07 | Stop reason: HOSPADM

## 2025-07-07 RX ORDER — OXYCODONE AND ACETAMINOPHEN 5; 325 MG/1; MG/1
1 TABLET ORAL EVERY 4 HOURS PRN
Qty: 12 TABLET | Refills: 0 | Status: SHIPPED | OUTPATIENT
Start: 2025-07-07

## 2025-07-07 RX ORDER — ONDANSETRON HYDROCHLORIDE 2 MG/ML
8 INJECTION, SOLUTION INTRAVENOUS
Status: COMPLETED | OUTPATIENT
Start: 2025-07-07 | End: 2025-07-07

## 2025-07-07 RX ORDER — LIDOCAINE HYDROCHLORIDE 20 MG/ML
INJECTION INTRAVENOUS
Status: DISCONTINUED | OUTPATIENT
Start: 2025-07-07 | End: 2025-07-07

## 2025-07-07 RX ORDER — MIDAZOLAM HYDROCHLORIDE 1 MG/ML
INJECTION INTRAMUSCULAR; INTRAVENOUS
Status: DISCONTINUED | OUTPATIENT
Start: 2025-07-07 | End: 2025-07-07

## 2025-07-07 RX ORDER — SODIUM CHLORIDE, SODIUM LACTATE, POTASSIUM CHLORIDE, CALCIUM CHLORIDE 600; 310; 30; 20 MG/100ML; MG/100ML; MG/100ML; MG/100ML
INJECTION, SOLUTION INTRAVENOUS CONTINUOUS
Status: DISCONTINUED | OUTPATIENT
Start: 2025-07-07 | End: 2025-07-07 | Stop reason: HOSPADM

## 2025-07-07 RX ADMIN — OXYCODONE HYDROCHLORIDE 5 MG: 5 TABLET ORAL at 01:07

## 2025-07-07 RX ADMIN — MIDAZOLAM HYDROCHLORIDE 2 MG: 1 INJECTION, SOLUTION INTRAMUSCULAR; INTRAVENOUS at 11:07

## 2025-07-07 RX ADMIN — PROPOFOL 120 MG: 10 INJECTION, EMULSION INTRAVENOUS at 11:07

## 2025-07-07 RX ADMIN — KETOROLAC TROMETHAMINE 15 MG: 30 INJECTION, SOLUTION INTRAMUSCULAR; INTRAVENOUS at 12:07

## 2025-07-07 RX ADMIN — FENTANYL CITRATE 50 MCG: 50 INJECTION, SOLUTION INTRAMUSCULAR; INTRAVENOUS at 11:07

## 2025-07-07 RX ADMIN — MUPIROCIN 1 G: 20 OINTMENT TOPICAL at 09:07

## 2025-07-07 RX ADMIN — HYDROMORPHONE HYDROCHLORIDE 0.2 MG: 2 INJECTION INTRAMUSCULAR; INTRAVENOUS; SUBCUTANEOUS at 01:07

## 2025-07-07 RX ADMIN — SODIUM CHLORIDE, SODIUM GLUCONATE, SODIUM ACETATE, POTASSIUM CHLORIDE AND MAGNESIUM CHLORIDE: 526; 502; 368; 37; 30 INJECTION, SOLUTION INTRAVENOUS at 09:07

## 2025-07-07 RX ADMIN — FENTANYL CITRATE 25 MCG: 50 INJECTION INTRAMUSCULAR; INTRAVENOUS at 01:07

## 2025-07-07 RX ADMIN — HYDROMORPHONE HYDROCHLORIDE 0.2 MG: 2 INJECTION INTRAMUSCULAR; INTRAVENOUS; SUBCUTANEOUS at 02:07

## 2025-07-07 RX ADMIN — LIDOCAINE HYDROCHLORIDE 100 MG: 20 INJECTION, SOLUTION INTRAVENOUS at 11:07

## 2025-07-07 RX ADMIN — SODIUM CHLORIDE 1000 ML: 9 INJECTION, SOLUTION INTRAVENOUS at 08:07

## 2025-07-07 RX ADMIN — DIPHENHYDRAMINE HYDROCHLORIDE 12.5 MG: 50 INJECTION INTRAMUSCULAR; INTRAVENOUS at 08:07

## 2025-07-07 RX ADMIN — ONDANSETRON 4 MG: 2 INJECTION INTRAMUSCULAR; INTRAVENOUS at 12:07

## 2025-07-07 RX ADMIN — LORAZEPAM 0.5 MG: 2 INJECTION INTRAMUSCULAR; INTRAVENOUS at 02:07

## 2025-07-07 RX ADMIN — ONDANSETRON 4 MG: 2 INJECTION INTRAMUSCULAR; INTRAVENOUS at 01:07

## 2025-07-07 RX ADMIN — FENTANYL CITRATE 25 MCG: 50 INJECTION, SOLUTION INTRAMUSCULAR; INTRAVENOUS at 12:07

## 2025-07-07 RX ADMIN — ONDANSETRON 8 MG: 2 INJECTION INTRAMUSCULAR; INTRAVENOUS at 08:07

## 2025-07-07 RX ADMIN — PROCHLORPERAZINE EDISYLATE 5 MG: 5 INJECTION INTRAMUSCULAR; INTRAVENOUS at 08:07

## 2025-07-07 NOTE — OP NOTE
[facility]  Department of Obstetrics & Gynecology  Operative Note      PATIENT NAME: Patricia James    MRN: [id number]  TODAY'S DATE: 07/07/2025  ADMIT DATE: 7/7/2025                              OPERATIVE REPORT:  7/7/2025    SURGEON: Miguel Ángel Arrington M.D.     ASSISTANT: None.     PREOPERATIVE DIAGNOSIS:   1. Abnormal uterine bleeding  2. Pelvic pain    POSTOPERATIVE DIAGNOSIS:   1. Abnormal uterine bleeding   2. Pelvic pain    SURGICAL PROCEDURE:   1.  Hysteroscopy  2.  Dilation and curettage (via MyoSure Lite device) with polypectomy  3. Endometrial ablation: NovaSure device    ANESTHESIA: general anesthesia with LMA    ESTIMATED BLOOD LOSS:  Minimal    FLUIDS: 900 mL of crystalloid.    URINE OUTPUT: 300 mL.    FINDINGS:    The uterus sounded to 8 cm with a cervical canal measurement of approximately 3 cm leading to an endometrial cavity length of approximately 5 cm.  Cavity width was calculated to 2.5 cm    Within the endometrial cavity some thickened polypoid tissue was visualized.        PROCEDURE IN DETAIL:     Prior to the procedure the patient was seen and evaluated in the preoperative area.  Potential risks associated with the surgery once again reviewed discussed.  The patient's questions were answered.    The patient was subsequently taken to the [facility] operating room, where genital anesthesia with LMA was initiated by the anesthesia department.      The patient was prepped and draped in the usual sterile fashion in the dorsal lithotomy position in the [facility].      The bladder was drained of clear urine.      At this time a time-out/safety procedure was performed with all participating parties in agreement as to the preoperative and operative plan.  Sequential compression hose were on the patient.    A bivalved speculum was introduced into the vagina and the anterior lip of the cervix grasped with a the single-tooth tenaculum. The uterus was gently sounded, and the  cervix was dilated to allow for the hysteroscope.     At this time hysteroscopic evaluation of the endometrial cavity was performed with the findings as mentioned above noted.    Following visualization of all four quadrants of the endometrial cavity, a curettage was performed under direct visualization.  In light of the polypoid appearing tissue curettage was performed removing the polypoid tissue as well as evaluation of all four quadrants of the endometrial cavity.  Following curettage, good hemostasis with an intact appearing cavity were noted.      The hysteroscope was then removed.    At this time the NovaSure was locked into the appropriate endometrial cavity length.  With gentle traction on the cervix the NovaSure device was now placed through the endocervical canal and into the endometrial cavity, up to the uterine fundus.  The NovaSure device was now slightly retracted.  The NovaSure device was now gently rotated anterior posterior and laterally to ensure appropriate placement of the device within the endometrial cavity.  The cervical collar was now used to seal the external cervical os.    The NovaSure device was used to calculate the width of the endometrial cavity.  Uterine cavity integrity was now assessed and found to be appropriate.    Following the endometrial cavity integrity test, the NovaSure device was activated, and the endometrial ablation was performed without difficulty.  This occurred for a total of 1 minute, 37 seconds at a power level of 69 dao.    Following endometrial ablation, the NovaSure device was removed without difficulty.    Hysteroscopy was once again performed noting appropriate blanching of the endometrial cavity with an intact appearing endometrial cavity.    The tenaculum was removed from the anterior lip of the cervix.  Good hemostasis was noted from the tenaculum site as well as the cervical os.    Final count was reported as correct per nursing.  Final fluid deficit was  reported as 305 mL (this number was felt to be a significant over estimation as there was a leak in the system and a large amount of fluid unaccounted for on the OR floor.).   The patient tolerated the procedure well.    Miguel Ángel Arrington M.D., FACOG  Department OBGYN [facility]

## 2025-07-07 NOTE — ANESTHESIA PREPROCEDURE EVALUATION
07/07/2025  Patricia James is a 35 y.o., female.      Pre-op Assessment    I have reviewed the Patient Summary Reports.     I have reviewed the Nursing Notes. I have reviewed the NPO Status.   I have reviewed the Medications.     Review of Systems  Anesthesia Hx:  No problems with previous Anesthesia                Social:  Non-Smoker       Hematology/Oncology:  Hematology Normal   Oncology Normal                                   EENT/Dental:  EENT/Dental Normal           Cardiovascular:  Cardiovascular Normal                                              Pulmonary:  Pulmonary Normal                       Renal/:  Chronic Renal Disease        Kidney Function/Disease             Hepatic/GI:  Hepatic/GI Normal                    Musculoskeletal:  Musculoskeletal Normal                OB/GYN/PEDS:  Abnormal uterine bleeding            Neurological:  Neurology Normal                                      Endocrine:        Morbid Obesity / BMI > 40  Dermatological:  Skin Normal    Psych:  Psychiatric Normal                    Physical Exam  General: Well nourished, Cooperative, Alert and Oriented    Airway:  Mallampati: II   Mouth Opening: Normal  TM Distance: Normal  Tongue: Normal  Neck ROM: Normal ROM    Dental:  Intact    Chest/Lungs:  Normal Respiratory Rate    Heart:  Rate: Normal        Anesthesia Plan  Type of Anesthesia, risks & benefits discussed:    Anesthesia Type: Gen ETT, Gen Supraglottic Airway  Intra-op Monitoring Plan: Standard ASA Monitors  Post Op Pain Control Plan: multimodal analgesia and IV/PO Opioids PRN  Induction:  IV  Airway Plan: , Post-Induction  Informed Consent: Informed consent signed with the Patient and all parties understand the risks and agree with anesthesia plan.  All questions answered.   ASA Score: 3  Day of Surgery Review of History & Physical: H&P Update referred  to the surgeon/provider.    Ready For Surgery From Anesthesia Perspective.     .

## 2025-07-07 NOTE — ANESTHESIA POSTPROCEDURE EVALUATION
Anesthesia Post Evaluation    Patient: Patricia James    Procedure(s) Performed: Procedure(s) (LRB):  ABLATION, ENDOMETRIUM, THERMAL (N/A)  HYSTEROSCOPY, WITH DILATION AND CURETTAGE OF UTERUS (N/A)    Final Anesthesia Type: general      Patient location during evaluation: PACU  Patient participation: Yes- Able to Participate  Level of consciousness: awake and alert  Post-procedure vital signs: reviewed and stable  Pain management: adequate  Airway patency: patent    PONV status at discharge: No PONV  Anesthetic complications: no      Cardiovascular status: hemodynamically stable  Respiratory status: unassisted and room air  Hydration status: euvolemic  Follow-up not needed.              Vitals Value Taken Time   /63 07/07/25 14:22   Temp 36.7 °C (98.1 °F) 07/07/25 14:22   Pulse 83 07/07/25 14:22   Resp 18 07/07/25 14:22   SpO2 96 % 07/07/25 14:22         Event Time   Out of Recovery 14:23:00         Pain/David Score: Pain Rating Prior to Med Admin: 7 (7/7/2025  2:05 PM)  David Score: 10 (7/7/2025  2:15 PM)  Modified David Score: 18 (7/7/2025  2:22 PM)

## 2025-07-07 NOTE — INTERVAL H&P NOTE
The patient has been examined and the H&P has been reviewed:    I concur with the findings and no changes have occurred since H&P was written.    Surgery risks, benefits and alternative options discussed and understood by patient/family.    Final Diagnosis  Fragments of proliferative endometrium, no atypical hyperplasia or malignancy identified.    Impression:     Thin sliver like collection of fluid in the cervix and also appearing to extend into the adjacent lower most uterine segment.  Endometrial measured at 12 mm     Assessment:  1. Abnormal uterine bleeding (AUB)   2. Pelvic pain      Plan: HYSTEROSCOPY, DILATION AND CURETTAGE, ENDOMETRIAL ABLATION AND OTHER INDICATED PROCEDURES      Miguel Ángel Arrington MD  Department OBGYN [facility]

## 2025-07-07 NOTE — DISCHARGE INSTRUCTIONS
"Discharge Instructions: After Your Surgery/Procedure  Youve just had surgery. During surgery you were given medicine called anesthesia to keep you relaxed and free of pain. After surgery you may have some pain or nausea. This is common. Here are some tips for feeling better and getting well after surgery.     Stay on schedule with your medication.   Going home  Your doctor or nurse will show you how to take care of yourself when you go home. He or she will also answer your questions. Have an adult family member or friend drive you home.      For your safety we recommend these precaution for the first 24 hours after your procedure:  Do not drive or use heavy equipment.  Do not make important decisions or sign legal papers.  Do not drink alcohol.  Have someone stay with you, if needed. He or she can watch for problems and help keep you safe.  Your concentration, balance, coordination, and judgement may be impaired for many hours after anesthesia.  Use caution when ambulating or standing up.     You may feel weak and "washed out" after anesthesia and surgery.      Subtle residual effects of general anesthesia or sedation with regional / local anesthesia can last more than 24 hours.  Rest for the remainder of the day or longer if your Doctor/Surgeon has advised you to do so.  Although you may feel normal within the first 24 hours, your reflexes and mental ability may be impaired without you realizing it.  You may feel dizzy, lightheaded or sleepy for 24 hours or longer.      Be sure to go to all follow-up visits with your doctor. And rest after your surgery for as long as your doctor tells you to.  Coping with pain  If you have pain after surgery, pain medicine will help you feel better. Take it as told, before pain becomes severe. Also, ask your doctor or pharmacist about other ways to control pain. This might be with heat, ice, or relaxation. And follow any other instructions your surgeon or nurse gives you.  Tips " for taking pain medicine  To get the best relief possible, remember these points:  Pain medicines can upset your stomach. Taking them with a little food may help.  Most pain relievers taken by mouth need at least 20 to 30 minutes to start to work.  Taking medicine on a schedule can help you remember to take it. Try to time your medicine so that you can take it before starting an activity. This might be before you get dressed, go for a walk, or sit down for dinner.  Constipation is a common side effect of pain medicines. Call your doctor before taking any medicines such as laxatives or stool softeners to help ease constipation. Also ask if you should skip any foods. Drinking lots of fluids and eating foods such as fruits and vegetables that are high in fiber can also help. Remember, do not take laxatives unless your surgeon has prescribed them.  Drinking alcohol and taking pain medicine can cause dizziness and slow your breathing. It can even be deadly. Do not drink alcohol while taking pain medicine.  Pain medicine can make you react more slowly to things. Do not drive or run machinery while taking pain medicine.  Your health care provider may tell you to take acetaminophen to help ease your pain. Ask him or her how much you are supposed to take each day. Acetaminophen or other pain relievers may interact with your prescription medicines or other over-the-counter (OTC) drugs. Some prescription medicines have acetaminophen and other ingredients. Using both prescription and OTC acetaminophen for pain can cause you to overdose. Read the labels on your OTC medicines with care. This will help you to clearly know the list of ingredients, how much to take, and any warnings. It may also help you not take too much acetaminophen. If you have questions or do not understand the information, ask your pharmacist or health care provider to explain it to you before you take the OTC medicine.  Managing nausea  Some people have an  upset stomach after surgery. This is often because of anesthesia, pain, or pain medicine, or the stress of surgery. These tips will help you handle nausea and eat healthy foods as you get better. If you were on a special food plan before surgery, ask your doctor if you should follow it while you get better. These tips may help:  Do not push yourself to eat. Your body will tell you when to eat and how much.  Start off with clear liquids and soup. They are easier to digest.  Next try semi-solid foods, such as mashed potatoes, applesauce, and gelatin, as you feel ready.  Slowly move to solid foods. Dont eat fatty, rich, or spicy foods at first.  Do not force yourself to have 3 large meals a day. Instead eat smaller amounts more often.  Take pain medicines with a small amount of solid food, such as crackers or toast, to avoid nausea.     Call your surgeon if  You still have pain an hour after taking medicine. The medicine may not be strong enough.  You feel too sleepy, dizzy, or groggy. The medicine may be too strong.  You have side effects like nausea, vomiting, or skin changes, such as rash, itching, or hives.       If you have obstructive sleep apnea  You were given anesthesia medicine during surgery to keep you comfortable and free of pain. After surgery, you may have more apnea spells because of this medicine and other medicines you were given. The spells may last longer than usual.   At home:  Keep using the continuous positive airway pressure (CPAP) device when you sleep. Unless your health care provider tells you not to, use it when you sleep, day or night. CPAP is a common device used to treat obstructive sleep apnea.  Talk with your provider before taking any pain medicine, muscle relaxants, or sedatives. Your provider will tell you about the possible dangers of taking these medicines.  © 9880-8229 [employer]. [address] [place] [address], [place] [address]. All rights reserved. This information is  not intended as a substitute for professional medical care. Always follow your healthcare professional's instructions.    Post op instructions for prevention of DVT  What is deep vein thrombosis?  Deep vein thrombosis (DVT) is the medical term for blood clots in the deep veins of the leg.  These blood clots can be dangerous.  A DVT can block a blood vessel and keep blood from getting where it needs to go.  Another problem is that the clot can travel to other parts of the body such as the lungs.  A clot that travels to the lungs is called a pulmonary embolus (PE) and can cause serious problems with breathing which can lead to death.  Am I at risk for DVT/PE?  If you are not very active, you are at risk of DVT.  Anyone confined to bed, sitting for long periods of time, recovering from surgery, etc. increases the risk of DVT.  Other risk factors are cancer diagnosis, certain medications, estrogen replacement in any form,older age, obesity, pregnancy, smoking, history of clotting disorders, and dehydration.  How will I know if I have a DVT?  Swelling in the lower leg  Pain  Warmth, redness, hardness or bulging of the vein  If you have any of these symptoms, call your doctors office right away.  Some people will not have any symptoms until the clot moves to the lungs.  What are the symptoms of a PE?  Panting, shortness of breath, or trouble breathing  Sharp, knife-like chest pain when you breathe  Coughing or coughing up blood  Rapid heartbeat  If you have any of these symptoms or get worse quickly, call 911 for emergency treatment.  How can I prevent a DVT?  Avoid long periods of inactivity and dont cross your legs--get up and walk around every hour or so.  Stay active--walking after surgery is highly encouraged.  This means you should get out of the house and walk in the neighborhood.  Going up and down stairs will not impair healing (unless advised against such activity by your doctor).    Drink plenty of  noncaffeinated, nonalcoholic fluids each day to prevent dehydration.  Wear special support stockings, if they have been advised by your doctor.  If you travel, stop at least once an hour and walk around.  Avoid smoking (assistance with stopping is available through your healthcare provider)  Always notify your doctor if you are not able to follow the post operative instructions that are given to you at the time of discharge.  It may be necessary to prescribe one of the medications available to prevent DVT.

## 2025-07-07 NOTE — PLAN OF CARE
AAOx3. NAD. VSS. Calm and cooperative. Speech appropriate. Tolerating clear liquids. Denies nausea. Pain controlled. 20G IV to Rt wrist infusing with dressing CDI. Peripad and towel in place. Due to void. Family notified of patient status. All safety measures taken. Bed in low position. Bedrails up x2. Bed locked. All patient belongings in post op. Cleared by anesthesia for phase 2.

## 2025-07-07 NOTE — DISCHARGE SUMMARY
[facility]  Discharge Note  Short Stay    Procedure(s) (LRB):  ABLATION, ENDOMETRIUM, THERMAL (N/A)  HYSTEROSCOPY, WITH DILATION AND CURETTAGE OF UTERUS (N/A)      OUTCOME: Patient tolerated treatment/procedure well without complication and is now ready for discharge.    DISPOSITION: Home or Self Care    FINAL DIAGNOSIS:  <principal problem not specified>    FOLLOWUP: In clinic    DISCHARGE INSTRUCTIONS:    Discharge Procedure Orders   Diet general     Ice to affected area     Lifting restrictions     Other restrictions (specify):   Order Comments: Discharge Instructions:  Follow-up in 2 weeks or as needed.  Call immediately for any abnormal pain bleeding fever chills nausea vomiting or other significant postoperative issues.    Pelvic rest until follow-up.  No tub baths until follow-up.  Diet as tolerated     No dressing needed     Call MD for:  temperature >100.4     Call MD for:  persistent nausea and vomiting     Call MD for:  severe uncontrolled pain     Call MD for:  difficulty breathing, headache or visual disturbances     Call MD for:  redness, tenderness, or signs of infection (pain, swelling, redness, odor or green/yellow discharge around incision site)     Call MD for:  hives     Call MD for:  persistent dizziness or light-headedness     Call MD for:  extreme fatigue     Call MD for:   Order Comments: Discharge Instructions:  Follow-up in 2 weeks or as needed.  Call immediately for any abnormal pain bleeding fever chills nausea vomiting or other significant postoperative issues.    Pelvic rest until follow-up.  No tub baths until follow-up.  Diet as tolerated     Activity as tolerated     Shower on day dressing removed (No bath)   Order Comments: No tub baths until patient seen for postoperative evaluation in the office         Clinical Reference Documents Added to Patient Instructions         Document    DILATION AND CURETTAGE DISCHARGE INSTRUCTIONS (ENGLISH)    HYSTEROSCOPY  DISCHARGE INSTRUCTIONS (ENGLISH)            TIME SPENT ON DISCHARGE: 10 minutes       Medication List        START taking these medications      ibuprofen 600 MG tablet  Commonly known as: ADVIL,MOTRIN  Take 1 tablet (600 mg total) by mouth every 6 (six) hours as needed for Pain.     oxyCODONE-acetaminophen 5-325 mg per tablet  Commonly known as: PERCOCET  Take 1 tablet by mouth every 4 (four) hours as needed for Pain (Diagnosis: Acute postoperative pain management).               Where to Get Your Medications        These medications were sent to [facility]  [facility] [place] [address]      Phone: [phone]   ibuprofen 600 MG tablet  oxyCODONE-acetaminophen 5-325 mg per tablet

## 2025-07-07 NOTE — ANESTHESIA PROCEDURE NOTES
Intubation    Date/Time: 7/7/2025 11:51 AM    Performed by: Adis Hall CRNA  Authorized by: Hema Russell MD    Intubation:     Induction:  Intravenous    Intubated:  Postinduction    Mask Ventilation:  Not attempted    Attempts:  1    Attempted By:  Student and CRNA    Difficult Airway Encountered?: No      Complications:  None    Airway Device:  Supraglottic airway/LMA    Airway Device Size:  3.0    Placement Verified By:  Capnometry    Complicating Factors:  None    Findings Post-Intubation:  BS equal bilateral

## 2025-07-07 NOTE — TRANSFER OF CARE
"Anesthesia Transfer of Care Note    Patient: Patricia James    Procedure(s) Performed: Procedure(s) (LRB):  ABLATION, ENDOMETRIUM, THERMAL (N/A)  HYSTEROSCOPY, WITH DILATION AND CURETTAGE OF UTERUS, endometrial ablation and other indicated procedures (N/A)    Patient location: PACU    Anesthesia Type: general    Transport from OR: Transported from OR on 2-3 L/min O2 by NC with adequate spontaneous ventilation    Post pain: adequate analgesia    Post assessment: no apparent anesthetic complications and tolerated procedure well    Post vital signs: stable    Level of consciousness: sedated    Nausea/Vomiting: no nausea/vomiting    Complications: none    Transfer of care protocol was followed    Last vitals: Visit Vitals  /73 (BP Location: Right arm, Patient Position: Lying)   Pulse 91   Temp 36.4 °C (97.5 °F) (Temporal)   Resp 16   Ht 5' 2" (1.575 m)   Wt 97.5 kg (215 lb)   LMP 06/21/2025 (Exact Date)   SpO2 96%   Breastfeeding No   BMI 39.32 kg/m²     "

## 2025-07-07 NOTE — PLAN OF CARE
Meets criteria for discharge. Discharged to home with . Denies nausea. Tolerating fluids. Tolerable pain. Steady gait. Voiding without difficulty. Discharge instructions reviewed and printed handout given. IS sent with pt for home use. Verbalized understanding.

## 2025-07-08 VITALS
TEMPERATURE: 98 F | BODY MASS INDEX: 39.56 KG/M2 | RESPIRATION RATE: 18 BRPM | HEART RATE: 82 BPM | HEIGHT: 62 IN | OXYGEN SATURATION: 97 % | WEIGHT: 215 LBS | DIASTOLIC BLOOD PRESSURE: 82 MMHG | SYSTOLIC BLOOD PRESSURE: 128 MMHG

## 2025-07-08 NOTE — ED PROVIDER NOTES
Encounter Date: 2025       History     Chief Complaint   Patient presents with    Vomiting     N/V and Headache s/p ablation today for abnormal uterine bleeding     35-year-old female presents to the ER with multiple episodes of vomiting after being discharged today from the hospital.  She was admitted for uterine ablation.  Reports left-sided headache.  No other pain.  Given Zofran at the hospital but he did not take anything at home for the vomiting.  Just reports multiple episodes, can not quantify.  No abdominal pain.    The history is provided by the patient.     Review of patient's allergies indicates:   Allergen Reactions    Ciprofloxacin Itching and Rash    Keflex [cephalexin]      rash    Latex, natural rubber Hives     Past Medical History:   Diagnosis Date    Renal disorder     kidney stone     Past Surgical History:   Procedure Laterality Date     SECTION      x 2    VULVECTOMY Left 2022    Procedure: VULVECTOMY, EXCISION OF VULVAR MASS AND OTHER INDICATED PROCEDURES;  Surgeon: Miguel Ángel Arrington MD;  Location: [facility] OR;  Service: OB/GYN;  Laterality: Left;    WRIST ARTHROSCOPY W/ TRIANGULAR FIBROCARTILAGE REPAIR       Family History   Problem Relation Name Age of Onset    Cancer Maternal Grandmother      Heart disease Maternal Grandmother      Leukemia Maternal Grandmother      Hyperlipidemia Father      Hypertension Father      Diabetes Father      Breast cancer Maternal Aunt      Ovarian cancer Maternal Aunt       Social History[1]  Review of Systems   Gastrointestinal:  Positive for nausea and vomiting.   Neurological:  Positive for headaches.       Physical Exam     Initial Vitals [25]   BP Pulse Resp Temp SpO2   (!) 143/83 104 18 97.8 °F (36.6 °C) 95 %      MAP       --         Physical Exam    Nursing note and vitals reviewed.  Constitutional: She appears well-developed and well-nourished.   HENT:   Head: Normocephalic and atraumatic.   Eyes: EOM are normal.   Neck: Neck  supple.   Normal range of motion.  Pulmonary/Chest: No respiratory distress.   Abdominal: There is no abdominal tenderness. There is no rebound.   Musculoskeletal:         General: Normal range of motion.      Cervical back: Normal range of motion and neck supple.     Neurological: She is alert and oriented to person, place, and time.   Skin: Skin is warm and dry.   Psychiatric: She has a normal mood and affect. Her behavior is normal. Judgment and thought content normal.         ED Course   Procedures  Labs Reviewed - No data to display       Imaging Results    None          Medications   sodium chloride 0.9% bolus 1,000 mL 1,000 mL (1,000 mLs Intravenous New Bag 7/7/25 2031)   prochlorperazine injection Soln 5 mg (has no administration in time range)   diphenhydrAMINE injection 12.5 mg (has no administration in time range)   ondansetron injection 8 mg (8 mg Intravenous Given 7/7/25 2029)     Medical Decision Making  35-year-old female presents from home with vomiting after being discharged here for uterine ablation.  Reports multiple episodes of vomiting at home.  She was given IV fluids and Zofran in the ER and feels much better at this time.  No indication for labs or imaging.  Abdomen is benign.  Voices readiness for discharge.  Headache and nausea improved in the ER she has not vomited.  She will be discharged home with her mother.  Return for any concerns.    Amount and/or Complexity of Data Reviewed  Independent Historian: parent  External Data Reviewed: notes.    Risk  Prescription drug management.               ED Course as of 07/07/25 2158 Mon Jul 07, 2025 2007 BP(!): 143/83 [EF]   2007 Temp: 97.8 °F (36.6 °C) [EF]   2007 Temp Source: Oral [EF]   2007 Pulse: 104 [EF]   2007 Resp: 18 [EF]   2007 SpO2: 95 % [EF]   2119 Sinus rhythm 86 beats per minute normal axis no ST elevation normal intervals independently interpreted [EF]   2145 Feels better, voices readiness for discharge. [EF]      ED Course User  Index  [EF] Joaquim Nguyễn MD                           Clinical Impression:  Final diagnoses:  [R11.0] Nausea                       [1]   Social History  Tobacco Use    Smoking status: Never    Smokeless tobacco: Never   Vaping Use    Vaping status: Never Used   Substance Use Topics    Alcohol use: Yes     Comment: occasionally    Drug use: No        Joaquim Nguyễn MD  07/07/25 [id number]

## 2025-07-08 NOTE — TELEPHONE ENCOUNTER
Pt post D& C today. Now c/o vomiting 4 times since getting home. Call placed to Dr. Miguel Ángel Arrington MD per protocol. Warm transfer to pt. Encounter routed to provider.   Reason for Disposition   [1] Caller has URGENT question AND [2] triager unable to answer question    Additional Information   Negative: Sounds like a life-threatening emergency to the triager   Negative: [1] Widespread rash AND [2] bright red, sunburn-like   Negative: [1] SEVERE headache (e.g., excruciating) AND [2] after spinal (epidural) anesthesia   Negative: [1] Major abdominal surgical incision AND [2] wound gaping open AND [3] visible internal organs   Negative: Sounds like a life-threatening emergency to the triager   Negative: [1] Vomiting AND [2] persists > 4 hours   Negative: [1] Vomiting AND [2] abdomen looks much more swollen than usual   Negative: [1] Drinking very little AND [2] dehydration suspected (e.g., no urine > 12 hours, very dry mouth, very lightheaded)   Negative: Patient sounds very sick or weak to the triager   Negative: Sounds like a serious complication to the triager   Negative: Fever > 100.4 F (38.0 C)   Negative: [1] SEVERE post-op pain (e.g., excruciating, pain scale 8-10) AND [2] not controlled with pain medications    Protocols used: Post-Op Incision Symptoms and [id number], Post-Op Symptoms and [id number]

## 2025-07-08 NOTE — ED NOTES
Upon discharge, patient is AAOx4, no cardiac or respiratory complications. Follow up care and  Medications have been reviewed with patient and has been instructed to return to the ER if needed. Patient verbalized understanding and ambulated to the lobby without difficulty. ELMER BARAJAS.

## 2025-07-08 NOTE — ED NOTES
Patricia James presents to the ED with c/o nausea and vomiting that started today after having a uterine ablation done this afternoon. Patient reports having sedation in the past and has not been sensitive to it in the past. Other symptoms are a headache to the top of her head. Patient is accompanied by her mother who is at the bedside.   Mucous membranes are pink and moist. Skin is warm, dry and intact.  KARL VSS  Verified patient's name and date of birth.

## 2025-07-08 NOTE — ED NOTES
I offered to patient and ice pack and to turn the lights down, she declined. Patient and mother denied any needs or questions at this [email or web address]

## 2025-07-11 LAB
OHS QRS DURATION: 86 MS
OHS QTC CALCULATION: 447 MS

## 2025-08-28 DIAGNOSIS — R51.9 NEW ONSET HEADACHE: ICD-10-CM

## 2025-08-28 DIAGNOSIS — R42 DIZZINESS: ICD-10-CM

## 2025-08-28 DIAGNOSIS — R93.0 ABNORMAL MRI OF THE HEAD: Primary | ICD-10-CM

## 2025-09-04 ENCOUNTER — HOSPITAL ENCOUNTER (OUTPATIENT)
Dept: RADIOLOGY | Facility: HOSPITAL | Age: 35
Discharge: HOME | End: 2025-09-04
Attending: STUDENT IN AN ORGANIZED HEALTH CARE EDUCATION/TRAINING PROGRAM
Payer: MEDICAID

## 2025-09-04 DIAGNOSIS — R93.0 ABNORMAL MRI OF THE HEAD: ICD-10-CM

## 2025-09-04 DIAGNOSIS — R51.9 NEW ONSET HEADACHE: ICD-10-CM

## 2025-09-04 DIAGNOSIS — R42 DIZZINESS: ICD-10-CM

## 2025-09-04 PROCEDURE — 70544 MR ANGIOGRAPHY HEAD W/O DYE: CPT | Mod: TC

## 2025-09-04 PROCEDURE — 70553 MRI BRAIN STEM W/O & W/DYE: CPT | Mod: 26,,, | Performed by: RADIOLOGY

## 2025-09-04 PROCEDURE — A9585 GADOBUTROL INJECTION: HCPCS

## 2025-09-04 PROCEDURE — 25500020 PHARM REV CODE 255

## 2025-09-04 PROCEDURE — 70553 MRI BRAIN STEM W/O & W/DYE: CPT | Mod: TC

## 2025-09-04 RX ORDER — GADOBUTROL 604.72 MG/ML
INJECTION INTRAVENOUS
Status: COMPLETED
Start: 2025-09-04 | End: 2025-09-04

## 2025-09-04 RX ADMIN — GADOBUTROL 9 ML: 604.72 INJECTION INTRAVENOUS at 06:09

## (undated) DEVICE — STRAP OR TABLE 5IN X 72IN

## (undated) DEVICE — GOWN X-LG STERILE BACK

## (undated) DEVICE — GLOVE SENSICARE PI ALOE 6

## (undated) DEVICE — SOL 9P NACL IRR PIC IL

## (undated) DEVICE — DRAPE UINDERBUT GRAD PCH

## (undated) DEVICE — Device

## (undated) DEVICE — SUT 0 VICRYL / CT-1

## (undated) DEVICE — SCRUB DYNA-HEX LIQ 4% CHG 4OZ

## (undated) DEVICE — CANNISTER NOVASURE CO2

## (undated) DEVICE — TRAY SKIN SCRUB DRY PREMIUM

## (undated) DEVICE — JELLY KY LUBRICATING 5G PACKET

## (undated) DEVICE — PACK SET UP 190 OMC-NS

## (undated) DEVICE — PACK SURG LITHOTOMY 3 SIRUS

## (undated) DEVICE — SCRUB HIBICLENS 4% CHG 4OZ

## (undated) DEVICE — GLOVE SENSICARE PI ALOE 7.5

## (undated) DEVICE — SPONGE GAUZE 16PLY 4X4

## (undated) DEVICE — SOL NACL IRR 1000ML BTL

## (undated) DEVICE — CATH URETHRAL RED 16FR

## (undated) DEVICE — PAD SANITARY MAXI 11IN

## (undated) DEVICE — UNDERPAD ULTRASORB 300LB 30X36

## (undated) DEVICE — CATH RED RUBBER LATEX 14F 16IN

## (undated) DEVICE — COVER SURG LIGHT HANDLE

## (undated) DEVICE — PACK FLUENT DISPOSABLE

## (undated) DEVICE — JELLY SURGILUBE LUBE TUBE 2OZ

## (undated) DEVICE — SYS LABEL CORRECT MED

## (undated) DEVICE — SOL IRRIGATION WATER 3000ML

## (undated) DEVICE — SOL NORMAL USPCA 0.9%

## (undated) DEVICE — SUT 3-0 VICRYL / SH (J416)

## (undated) DEVICE — BOWL STERILE LARGE 32OZ

## (undated) DEVICE — SEAL LENS SCOPE MYOSURE

## (undated) DEVICE — DEVICE ABLATION NOVASURE DISP

## (undated) DEVICE — TOWEL OR DISP STRL BLUE 4/PK

## (undated) DEVICE — GOWN POLY REINF BRTH SLV 2XL

## (undated) DEVICE — GLOVE SURG ULTRA TOUCH 7.5